# Patient Record
Sex: FEMALE | Race: BLACK OR AFRICAN AMERICAN | Employment: OTHER | URBAN - METROPOLITAN AREA
[De-identification: names, ages, dates, MRNs, and addresses within clinical notes are randomized per-mention and may not be internally consistent; named-entity substitution may affect disease eponyms.]

---

## 2018-07-29 ENCOUNTER — APPOINTMENT (OUTPATIENT)
Dept: GENERAL RADIOLOGY | Age: 78
End: 2018-07-29
Payer: MEDICARE

## 2018-07-29 ENCOUNTER — HOSPITAL ENCOUNTER (EMERGENCY)
Age: 78
Discharge: HOME | End: 2018-07-29
Attending: EMERGENCY MEDICINE
Payer: MEDICARE

## 2018-07-29 VITALS
RESPIRATION RATE: 20 BRPM | HEIGHT: 67 IN | OXYGEN SATURATION: 97 % | BODY MASS INDEX: 41.46 KG/M2 | WEIGHT: 264.19 LBS | TEMPERATURE: 98.1 F | HEART RATE: 81 BPM | SYSTOLIC BLOOD PRESSURE: 140 MMHG | DIASTOLIC BLOOD PRESSURE: 74 MMHG

## 2018-07-29 DIAGNOSIS — M54.50 ACUTE MIDLINE LOW BACK PAIN WITHOUT SCIATICA: Primary | ICD-10-CM

## 2018-07-29 PROCEDURE — 72100 X-RAY EXAM L-S SPINE 2/3 VWS: CPT

## 2018-07-29 PROCEDURE — 6370000000 HC RX 637 (ALT 250 FOR IP): Performed by: PHYSICIAN ASSISTANT

## 2018-07-29 PROCEDURE — 72220 X-RAY EXAM SACRUM TAILBONE: CPT

## 2018-07-29 PROCEDURE — 99283 EMERGENCY DEPT VISIT LOW MDM: CPT

## 2018-07-29 RX ORDER — PANTOPRAZOLE SODIUM 40 MG/1
40 TABLET, DELAYED RELEASE ORAL DAILY
COMMUNITY
End: 2019-11-01 | Stop reason: CLARIF

## 2018-07-29 RX ORDER — LIDOCAINE 50 MG/G
1 PATCH TOPICAL ONCE
Status: DISCONTINUED | OUTPATIENT
Start: 2018-07-29 | End: 2018-07-29 | Stop reason: HOSPADM

## 2018-07-29 ASSESSMENT — PAIN DESCRIPTION - LOCATION: LOCATION: BACK

## 2018-07-29 ASSESSMENT — PAIN DESCRIPTION - PAIN TYPE: TYPE: ACUTE PAIN

## 2018-07-29 ASSESSMENT — PAIN SCALES - GENERAL: PAINLEVEL_OUTOF10: 9

## 2018-07-29 NOTE — ED PROVIDER NOTES
[address] ENCOUNTER          PHYSICIAN ASSISTANT NOTE       Date of evaluation: 7/29/2018    Chief Complaint     Back Pain (onset Thursday; pain in the tailbone; no recent trauma; pt denies previous hx of pain in this area; no radiation)      History of Present Illness     Leslye Montes is a 70 y.o. female who presents to the emergency department for evaluation of back pain. Pain is primarily in her tailbone. She came to the emergency department with her son, who stated that on Thursday she may have sat down too hard on the toilet injuring her back. She has had no associated falls, no weakness of her legs, no numbness in her groin, no saddle anesthesias, and no pain radiating into her buttocks or her legs. She has never had back pain like this in the past.  She has had no difficulty walking with the pain. Pain is primarily when sitting and when going from sitting to standing. She has not taken medication for relief of her symptoms. Review of Systems     Review of Systems   Constitutional: Negative for chills, diaphoresis, fatigue and fever. Respiratory: Negative for chest tightness and shortness of breath. Cardiovascular: Negative for chest pain. Gastrointestinal: Negative for abdominal pain, diarrhea, nausea and vomiting. Musculoskeletal: Positive for back pain. Negative for arthralgias, gait problem, joint swelling, myalgias, neck pain and neck stiffness. Skin: Negative for color change and pallor. Neurological: Negative for weakness and numbness. 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 She has no changes in gait, she was able to really throughout the emergency department without difficulty. She presented afebrile, hemodynamically stable, nontoxic appearing. She had no signs of cord compression, cauda equina syndrome, osteomyelitis or epidural abscess on physical exam.    X-rays completed of the lumbar spine and Sacrum coccyx showing degenerative change with no acute bony abnormality. Discussed with the patient that she has likely bruised her coccyx and the treatment is symptomatic management. The patient should return to the ED if the back pain worsens, or if they experience incontinence, numbness or tingling in the legs, or inability to ambulate. The patient is in agreement with this plan. This patient was also evaluated by the attending physician. All care plans were discussed and agreed upon. Clinical Impression     1. Acute midline low back pain without sciatica        Disposition     PATIENT REFERRED TO:  No follow-up provider specified.     DISCHARGE MEDICATIONS:  Discharge Medication List as of 7/29/2018  1:33 PM          DISPOSITION Decision To Discharge 07/29/2018 01:28:58 PM       Sancho Villalta PA-C  08/02/18 1016

## 2018-08-02 ASSESSMENT — ENCOUNTER SYMPTOMS
ABDOMINAL PAIN: 0
COLOR CHANGE: 0
BACK PAIN: 1
VOMITING: 0
CHEST TIGHTNESS: 0
NAUSEA: 0
SHORTNESS OF BREATH: 0
DIARRHEA: 0

## 2018-09-19 ENCOUNTER — HOSPITAL ENCOUNTER (OUTPATIENT)
Age: 78
Discharge: HOME | End: 2018-09-19
Payer: MEDICARE

## 2018-09-19 ENCOUNTER — HOSPITAL ENCOUNTER (OUTPATIENT)
Dept: GENERAL RADIOLOGY | Age: 78
Discharge: HOME | End: 2018-09-19
Payer: MEDICARE

## 2018-09-19 DIAGNOSIS — M15.9 PRIMARY OSTEOARTHRITIS INVOLVING MULTIPLE JOINTS: ICD-10-CM

## 2018-09-19 DIAGNOSIS — M25.552 BILATERAL HIP PAIN: ICD-10-CM

## 2018-09-19 DIAGNOSIS — R30.0 DYSURIA: ICD-10-CM

## 2018-09-19 DIAGNOSIS — M25.551 BILATERAL HIP PAIN: ICD-10-CM

## 2018-09-19 DIAGNOSIS — R53.81 DEBILITY: ICD-10-CM

## 2018-09-19 PROCEDURE — 73522 X-RAY EXAM HIPS BI 3-4 VIEWS: CPT

## 2018-10-04 ENCOUNTER — APPOINTMENT (OUTPATIENT)
Dept: GENERAL RADIOLOGY | Age: 78
End: 2018-10-04
Payer: MEDICARE

## 2018-10-04 ENCOUNTER — HOSPITAL ENCOUNTER (EMERGENCY)
Age: 78
Discharge: HOME | End: 2018-10-04
Attending: EMERGENCY MEDICINE
Payer: MEDICARE

## 2018-10-04 VITALS
HEART RATE: 78 BPM | OXYGEN SATURATION: 100 % | RESPIRATION RATE: 14 BRPM | TEMPERATURE: 98.1 F | DIASTOLIC BLOOD PRESSURE: 79 MMHG | BODY MASS INDEX: 43.39 KG/M2 | WEIGHT: 270 LBS | HEIGHT: 66 IN | SYSTOLIC BLOOD PRESSURE: 128 MMHG

## 2018-10-04 DIAGNOSIS — R60.0 LEG EDEMA: Primary | ICD-10-CM

## 2018-10-04 LAB
ANION GAP SERPL CALCULATED.3IONS-SCNC: 11 MMOL/L (ref 3–16)
BACTERIA: ABNORMAL /HPF
BASOPHILS ABSOLUTE: 0.1 K/UL (ref 0–0.2)
BASOPHILS RELATIVE PERCENT: 1.2 %
BILIRUBIN URINE: NEGATIVE
BLOOD, URINE: NEGATIVE
BUN BLDV-MCNC: 16 MG/DL (ref 7–20)
CALCIUM SERPL-MCNC: 10.2 MG/DL (ref 8.3–10.6)
CHLORIDE BLD-SCNC: 100 MMOL/L (ref 99–110)
CLARITY: CLEAR
CO2: 27 MMOL/L (ref 21–32)
COLOR: YELLOW
CREAT SERPL-MCNC: 1 MG/DL (ref 0.6–1.2)
EOSINOPHILS ABSOLUTE: 0.1 K/UL (ref 0–0.6)
EOSINOPHILS RELATIVE PERCENT: 1.6 %
EPITHELIAL CELLS, UA: ABNORMAL /HPF
GFR AFRICAN AMERICAN: >60
GFR NON-AFRICAN AMERICAN: 54
GLUCOSE BLD-MCNC: 199 MG/DL (ref 70–99)
GLUCOSE URINE: NEGATIVE MG/DL
HCT VFR BLD CALC: 29.4 % (ref 36–48)
HEMOGLOBIN: 9.5 G/DL (ref 12–16)
KETONES, URINE: NEGATIVE MG/DL
LEUKOCYTE ESTERASE, URINE: ABNORMAL
LYMPHOCYTES ABSOLUTE: 1.8 K/UL (ref 1–5.1)
LYMPHOCYTES RELATIVE PERCENT: 19.8 %
MCH RBC QN AUTO: 25.6 PG (ref 26–34)
MCHC RBC AUTO-ENTMCNC: 32.4 G/DL (ref 31–36)
MCV RBC AUTO: 79 FL (ref 80–100)
MICROSCOPIC EXAMINATION: YES
MONOCYTES ABSOLUTE: 0.8 K/UL (ref 0–1.3)
MONOCYTES RELATIVE PERCENT: 9 %
NEUTROPHILS ABSOLUTE: 6.4 K/UL (ref 1.7–7.7)
NEUTROPHILS RELATIVE PERCENT: 68.4 %
NITRITE, URINE: NEGATIVE
PDW BLD-RTO: 16 % (ref 12.4–15.4)
PH UA: 6.5
PLATELET # BLD: 318 K/UL (ref 135–450)
PMV BLD AUTO: 8.3 FL (ref 5–10.5)
POTASSIUM SERPL-SCNC: 4.5 MMOL/L (ref 3.5–5.1)
PRO-BNP: 36 PG/ML (ref 0–449)
PROTEIN UA: 100 MG/DL
RBC # BLD: 3.73 M/UL (ref 4–5.2)
RBC UA: ABNORMAL /HPF (ref 0–2)
SODIUM BLD-SCNC: 138 MMOL/L (ref 136–145)
SPECIFIC GRAVITY UA: 1.01
URINE TYPE: ABNORMAL
UROBILINOGEN, URINE: 1 E.U./DL
WBC # BLD: 9.4 K/UL (ref 4–11)
WBC UA: ABNORMAL /HPF (ref 0–5)

## 2018-10-04 PROCEDURE — 6370000000 HC RX 637 (ALT 250 FOR IP): Performed by: STUDENT IN AN ORGANIZED HEALTH CARE EDUCATION/TRAINING PROGRAM

## 2018-10-04 PROCEDURE — 93005 ELECTROCARDIOGRAM TRACING: CPT | Performed by: STUDENT IN AN ORGANIZED HEALTH CARE EDUCATION/TRAINING PROGRAM

## 2018-10-04 PROCEDURE — 87086 URINE CULTURE/COLONY COUNT: CPT

## 2018-10-04 PROCEDURE — 6360000002 HC RX W HCPCS: Performed by: STUDENT IN AN ORGANIZED HEALTH CARE EDUCATION/TRAINING PROGRAM

## 2018-10-04 PROCEDURE — 81001 URINALYSIS AUTO W/SCOPE: CPT

## 2018-10-04 PROCEDURE — 71046 X-RAY EXAM CHEST 2 VIEWS: CPT

## 2018-10-04 PROCEDURE — 96374 THER/PROPH/DIAG INJ IV PUSH: CPT

## 2018-10-04 PROCEDURE — 85025 COMPLETE CBC W/AUTO DIFF WBC: CPT

## 2018-10-04 PROCEDURE — 83880 ASSAY OF NATRIURETIC PEPTIDE: CPT

## 2018-10-04 PROCEDURE — 80048 BASIC METABOLIC PNL TOTAL CA: CPT

## 2018-10-04 PROCEDURE — 99284 EMERGENCY DEPT VISIT MOD MDM: CPT

## 2018-10-04 RX ORDER — HYDROCODONE BITARTRATE AND ACETAMINOPHEN 5; 325 MG/1; MG/1
1 TABLET ORAL ONCE
Status: COMPLETED | OUTPATIENT
Start: 2018-10-04 | End: 2018-10-04

## 2018-10-04 RX ORDER — FUROSEMIDE 20 MG/1
20 TABLET ORAL 2 TIMES DAILY
Status: ON HOLD | COMMUNITY
Start: 2018-09-05 | End: 2019-11-03 | Stop reason: SDUPTHER

## 2018-10-04 RX ORDER — ACETAMINOPHEN 325 MG/1
650 TABLET ORAL EVERY 4 HOURS PRN
Qty: 30 TABLET | Refills: 0 | Status: SHIPPED | OUTPATIENT
Start: 2018-10-04 | End: 2021-04-15 | Stop reason: CLARIF

## 2018-10-04 RX ORDER — FUROSEMIDE 10 MG/ML
40 INJECTION INTRAMUSCULAR; INTRAVENOUS ONCE
Status: COMPLETED | OUTPATIENT
Start: 2018-10-04 | End: 2018-10-04

## 2018-10-04 RX ADMIN — HYDROCODONE BITARTRATE AND ACETAMINOPHEN 1 TABLET: 5; 325 TABLET ORAL at 18:54

## 2018-10-04 RX ADMIN — FUROSEMIDE 40 MG: 10 INJECTION, SOLUTION INTRAMUSCULAR; INTRAVENOUS at 17:03

## 2018-10-04 ASSESSMENT — PAIN DESCRIPTION - LOCATION: LOCATION: BACK

## 2018-10-04 ASSESSMENT — ENCOUNTER SYMPTOMS
ABDOMINAL PAIN: 0
CONSTIPATION: 1
SHORTNESS OF BREATH: 0
BLOOD IN STOOL: 1
NAUSEA: 0
DIARRHEA: 0
CHEST TIGHTNESS: 0
BACK PAIN: 1
COUGH: 0
VOMITING: 0
ANAL BLEEDING: 0

## 2018-10-04 ASSESSMENT — PAIN DESCRIPTION - DESCRIPTORS: DESCRIPTORS: DULL

## 2018-10-04 ASSESSMENT — PAIN SCALES - GENERAL: PAINLEVEL_OUTOF10: 9

## 2018-10-04 ASSESSMENT — PAIN DESCRIPTION - ORIENTATION: ORIENTATION: LOWER;LEFT

## 2018-10-04 ASSESSMENT — PAIN DESCRIPTION - PAIN TYPE: TYPE: ACUTE PAIN

## 2018-10-04 NOTE — ED PROVIDER NOTES
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 She has a past surgical history that includes Hysterectomy (2011) and other surgical history (10/01/2013). Her family history is not on file. She reports that she has never smoked. She has never used smokeless tobacco. She reports that she does not drink alcohol or use drugs. Medications     Current Discharge Medication List      CONTINUE these medications which have NOT CHANGED    Details   furosemide (LASIX) 20 MG tablet       pantoprazole (PROTONIX) 40 MG tablet Take 40 mg by mouth daily      glipiZIDE (GLUCOTROL) 2.5 MG ER tablet Take 2.5 mg by mouth daily      risperiDONE (RISPERDAL) 2 MG tablet Take 1 tablet by mouth daily. Qty: 60 tablet, Refills: 0      aspirin EC 81 MG EC tablet Take 1 tablet by mouth daily. Qty: 30 tablet, Refills: 0      lisinopril (PRINIVIL;ZESTRIL) 10 MG tablet Take 10 mg by mouth daily. atorvastatin (LIPITOR) 80 MG tablet Take 1 tablet by mouth nightly. Qty: 30 tablet, Refills: 1             Allergies     She has No Known Allergies. Physical Exam     INITIAL VITALS: BP: 122/76, Temp: 98.1 °F (36.7 °C), Pulse: 77, Resp: 18, SpO2: 99 %   Physical Exam   Constitutional: She is oriented to person, place, and time. She appears well-developed and well-nourished. No distress. HENT:   Head: Normocephalic and atraumatic. Eyes: Pupils are equal, round, and reactive to light.  Conjunctivae are Notes, Past Medical Hx, Past Surgical Hx, Social Hx, Allergies, and Family Hx were reviewed. The patient was given the following medications:  Orders Placed This Encounter   Medications    furosemide (LASIX) injection 40 mg    HYDROcodone-acetaminophen (NORCO) 5-325 MG per tablet 1 tablet    acetaminophen (TYLENOL) 325 MG tablet     Sig: Take 2 tablets by mouth every 4 hours as needed for Pain     Dispense:  30 tablet     Refill:  0       CONSULTS:  None    MEDICAL DECISION MAKING / ASSESSMENT / Fannie Blaine is a 70 y.o. female with HTN, DMT2, CVD in 2015 with residual RLE and LUE weakness, GI bleed, chronic urinary incontinence who presents with back pain and bilateral leg swelling. Patient with tenderness to palpation in the left lumbar area, but no signs of infection or step-offs. No recent trauma or falls that would require additional imaging at this time. No dysuria, questionable increased frequency, and urinalysis with negative nitrites and only trace leukocyte esterase. Low suspicion for UTI at this time. Patient evaluated for new onset congestive heart failure in context of presenting symptom of lower extremity edema. BNP 36. EKG with no acute changes, and CXR with no active pulmonary disease or venous congestion. No signs of volume overload at this time. Lower extremity swelling likely due to dependent edema or venous insufficiency. She will be discharged with a prescription for Tylenol and encouraged to follow up with her PCP, Dr. Candy Dorman, regarding further evaluation of her lower extremity edema as needed. This patient was also evaluated by the attending physician. All care plans were discussed and agreed upon. Clinical Impression     1. Leg edema        Disposition     PATIENT REFERRED TO:  No follow-up provider specified.     DISCHARGE MEDICATIONS:  Current Discharge Medication List          DISPOSITION Decision To Discharge 10/04/2018 06:37:15 PM

## 2018-10-06 LAB — URINE CULTURE, ROUTINE: NORMAL

## 2018-10-12 LAB
EKG ATRIAL RATE: 64 BPM
EKG DIAGNOSIS: NORMAL
EKG P AXIS: 31 DEGREES
EKG P-R INTERVAL: 146 MS
EKG Q-T INTERVAL: 388 MS
EKG QRS DURATION: 76 MS
EKG QTC CALCULATION (BAZETT): 400 MS
EKG R AXIS: -13 DEGREES
EKG T AXIS: 34 DEGREES
EKG VENTRICULAR RATE: 64 BPM

## 2019-01-22 ENCOUNTER — APPOINTMENT (OUTPATIENT)
Dept: GENERAL RADIOLOGY | Age: 79
End: 2019-01-22
Payer: MEDICARE

## 2019-01-22 ENCOUNTER — HOSPITAL ENCOUNTER (EMERGENCY)
Age: 79
Discharge: HOME | End: 2019-01-22
Attending: EMERGENCY MEDICINE
Payer: MEDICARE

## 2019-01-22 VITALS
HEART RATE: 58 BPM | SYSTOLIC BLOOD PRESSURE: 128 MMHG | DIASTOLIC BLOOD PRESSURE: 76 MMHG | TEMPERATURE: 98.5 F | RESPIRATION RATE: 12 BRPM | OXYGEN SATURATION: 94 %

## 2019-01-22 DIAGNOSIS — S46.911A SHOULDER STRAIN, RIGHT, INITIAL ENCOUNTER: Primary | ICD-10-CM

## 2019-01-22 PROCEDURE — 73030 X-RAY EXAM OF SHOULDER: CPT

## 2019-01-22 PROCEDURE — 6370000000 HC RX 637 (ALT 250 FOR IP): Performed by: STUDENT IN AN ORGANIZED HEALTH CARE EDUCATION/TRAINING PROGRAM

## 2019-01-22 PROCEDURE — 99283 EMERGENCY DEPT VISIT LOW MDM: CPT

## 2019-01-22 RX ORDER — ACETAMINOPHEN 325 MG/1
650 TABLET ORAL ONCE
Status: COMPLETED | OUTPATIENT
Start: 2019-01-22 | End: 2019-01-22

## 2019-01-22 RX ORDER — LIDOCAINE 50 MG/G
1 PATCH TOPICAL DAILY
Qty: 30 PATCH | Refills: 0 | Status: SHIPPED | OUTPATIENT
Start: 2019-01-22 | End: 2019-02-21

## 2019-01-22 RX ADMIN — ACETAMINOPHEN 650 MG: 325 TABLET ORAL at 19:38

## 2019-01-22 ASSESSMENT — PAIN SCALES - GENERAL
PAINLEVEL_OUTOF10: 10
PAINLEVEL_OUTOF10: 10

## 2019-01-22 ASSESSMENT — ENCOUNTER SYMPTOMS
GASTROINTESTINAL NEGATIVE: 1
RESPIRATORY NEGATIVE: 1

## 2019-01-22 ASSESSMENT — PAIN DESCRIPTION - ORIENTATION: ORIENTATION: RIGHT

## 2019-01-22 ASSESSMENT — PAIN DESCRIPTION - PAIN TYPE: TYPE: ACUTE PAIN

## 2019-01-22 ASSESSMENT — PAIN DESCRIPTION - DESCRIPTORS: DESCRIPTORS: ACHING;CONSTANT

## 2019-01-22 ASSESSMENT — PAIN DESCRIPTION - LOCATION: LOCATION: SHOULDER

## 2019-11-01 ENCOUNTER — HOSPITAL ENCOUNTER (OUTPATIENT)
Age: 79
Setting detail: OBSERVATION
Discharge: HOME | End: 2019-11-03
Attending: EMERGENCY MEDICINE | Admitting: INTERNAL MEDICINE
Payer: MEDICARE

## 2019-11-01 ENCOUNTER — APPOINTMENT (OUTPATIENT)
Dept: GENERAL RADIOLOGY | Age: 79
End: 2019-11-01
Payer: MEDICARE

## 2019-11-01 DIAGNOSIS — R07.9 CHEST PAIN, UNSPECIFIED TYPE: Primary | ICD-10-CM

## 2019-11-01 PROBLEM — E66.01 MORBID OBESITY DUE TO EXCESS CALORIES (HCC): Status: ACTIVE | Noted: 2019-11-01

## 2019-11-01 PROBLEM — E78.5 HYPERLIPIDEMIA: Status: ACTIVE | Noted: 2019-11-01

## 2019-11-01 PROBLEM — E11.9 DMII (DIABETES MELLITUS, TYPE 2) (HCC): Status: ACTIVE | Noted: 2019-11-01

## 2019-11-01 LAB
ANION GAP SERPL CALCULATED.3IONS-SCNC: 12 MMOL/L (ref 3–16)
BASOPHILS ABSOLUTE: 0.2 K/UL (ref 0–0.2)
BASOPHILS RELATIVE PERCENT: 1.9 %
BUN BLDV-MCNC: 10 MG/DL (ref 7–20)
CALCIUM SERPL-MCNC: 10 MG/DL (ref 8.3–10.6)
CHLORIDE BLD-SCNC: 103 MMOL/L (ref 99–110)
CO2: 22 MMOL/L (ref 21–32)
CREAT SERPL-MCNC: 0.8 MG/DL (ref 0.6–1.2)
EKG ATRIAL RATE: 58 BPM
EKG ATRIAL RATE: 69 BPM
EKG DIAGNOSIS: NORMAL
EKG DIAGNOSIS: NORMAL
EKG P AXIS: 25 DEGREES
EKG P AXIS: 46 DEGREES
EKG P-R INTERVAL: 148 MS
EKG P-R INTERVAL: 164 MS
EKG Q-T INTERVAL: 406 MS
EKG Q-T INTERVAL: 414 MS
EKG QRS DURATION: 74 MS
EKG QRS DURATION: 76 MS
EKG QTC CALCULATION (BAZETT): 398 MS
EKG QTC CALCULATION (BAZETT): 443 MS
EKG R AXIS: -12 DEGREES
EKG R AXIS: -18 DEGREES
EKG T AXIS: -62 DEGREES
EKG T AXIS: 31 DEGREES
EKG VENTRICULAR RATE: 58 BPM
EKG VENTRICULAR RATE: 69 BPM
EOSINOPHILS ABSOLUTE: 0.2 K/UL (ref 0–0.6)
EOSINOPHILS RELATIVE PERCENT: 2.6 %
GFR AFRICAN AMERICAN: >60
GFR NON-AFRICAN AMERICAN: >60
GLUCOSE BLD-MCNC: 202 MG/DL (ref 70–99)
GLUCOSE BLD-MCNC: 261 MG/DL (ref 70–99)
GLUCOSE BLD-MCNC: 311 MG/DL (ref 70–99)
HCT VFR BLD CALC: 31.2 % (ref 36–48)
HEMOGLOBIN: 9.8 G/DL (ref 12–16)
LYMPHOCYTES ABSOLUTE: 2 K/UL (ref 1–5.1)
LYMPHOCYTES RELATIVE PERCENT: 25.6 %
MCH RBC QN AUTO: 23.5 PG (ref 26–34)
MCHC RBC AUTO-ENTMCNC: 31.5 G/DL (ref 31–36)
MCV RBC AUTO: 74.8 FL (ref 80–100)
MONOCYTES ABSOLUTE: 0.9 K/UL (ref 0–1.3)
MONOCYTES RELATIVE PERCENT: 11.6 %
NEUTROPHILS ABSOLUTE: 4.7 K/UL (ref 1.7–7.7)
NEUTROPHILS RELATIVE PERCENT: 58.3 %
PDW BLD-RTO: 18.4 % (ref 12.4–15.4)
PERFORMED ON: ABNORMAL
PERFORMED ON: ABNORMAL
PLATELET # BLD: 246 K/UL (ref 135–450)
PMV BLD AUTO: 8.9 FL (ref 5–10.5)
POTASSIUM REFLEX MAGNESIUM: 4.5 MMOL/L (ref 3.5–5.1)
PRO-BNP: 34 PG/ML (ref 0–449)
RBC # BLD: 4.18 M/UL (ref 4–5.2)
SODIUM BLD-SCNC: 137 MMOL/L (ref 136–145)
T4 FREE: 1.3 NG/DL (ref 0.9–1.8)
TROPONIN: <0.01 NG/ML
TROPONIN: <0.01 NG/ML
TSH SERPL DL<=0.05 MIU/L-ACNC: 0.85 UIU/ML (ref 0.27–4.2)
WBC # BLD: 8 K/UL (ref 4–11)

## 2019-11-01 PROCEDURE — 85025 COMPLETE CBC W/AUTO DIFF WBC: CPT

## 2019-11-01 PROCEDURE — G0378 HOSPITAL OBSERVATION PER HR: HCPCS

## 2019-11-01 PROCEDURE — 2580000003 HC RX 258: Performed by: INTERNAL MEDICINE

## 2019-11-01 PROCEDURE — 99285 EMERGENCY DEPT VISIT HI MDM: CPT

## 2019-11-01 PROCEDURE — 6370000000 HC RX 637 (ALT 250 FOR IP): Performed by: INTERNAL MEDICINE

## 2019-11-01 PROCEDURE — 36415 COLL VENOUS BLD VENIPUNCTURE: CPT

## 2019-11-01 PROCEDURE — 93010 ELECTROCARDIOGRAM REPORT: CPT | Performed by: INTERNAL MEDICINE

## 2019-11-01 PROCEDURE — 84439 ASSAY OF FREE THYROXINE: CPT

## 2019-11-01 PROCEDURE — 83880 ASSAY OF NATRIURETIC PEPTIDE: CPT

## 2019-11-01 PROCEDURE — 96372 THER/PROPH/DIAG INJ SC/IM: CPT

## 2019-11-01 PROCEDURE — 93005 ELECTROCARDIOGRAM TRACING: CPT | Performed by: INTERNAL MEDICINE

## 2019-11-01 PROCEDURE — 84484 ASSAY OF TROPONIN QUANT: CPT

## 2019-11-01 PROCEDURE — 93005 ELECTROCARDIOGRAM TRACING: CPT | Performed by: STUDENT IN AN ORGANIZED HEALTH CARE EDUCATION/TRAINING PROGRAM

## 2019-11-01 PROCEDURE — 84443 ASSAY THYROID STIM HORMONE: CPT

## 2019-11-01 PROCEDURE — 6360000002 HC RX W HCPCS: Performed by: INTERNAL MEDICINE

## 2019-11-01 PROCEDURE — 80048 BASIC METABOLIC PNL TOTAL CA: CPT

## 2019-11-01 PROCEDURE — 71045 X-RAY EXAM CHEST 1 VIEW: CPT

## 2019-11-01 RX ORDER — ATORVASTATIN CALCIUM 80 MG/1
80 TABLET, FILM COATED ORAL NIGHTLY
Status: DISCONTINUED | OUTPATIENT
Start: 2019-11-01 | End: 2019-11-03 | Stop reason: HOSPADM

## 2019-11-01 RX ORDER — LISINOPRIL 10 MG/1
10 TABLET ORAL DAILY
Status: DISCONTINUED | OUTPATIENT
Start: 2019-11-01 | End: 2019-11-03 | Stop reason: HOSPADM

## 2019-11-01 RX ORDER — INSULIN LISPRO 100 [IU]/ML
0-12 INJECTION, SOLUTION INTRAVENOUS; SUBCUTANEOUS
Status: DISCONTINUED | OUTPATIENT
Start: 2019-11-01 | End: 2019-11-01

## 2019-11-01 RX ORDER — RISPERIDONE 1 MG/1
2 TABLET, FILM COATED ORAL NIGHTLY
Status: DISCONTINUED | OUTPATIENT
Start: 2019-11-01 | End: 2019-11-03 | Stop reason: HOSPADM

## 2019-11-01 RX ORDER — SODIUM CHLORIDE 0.9 % (FLUSH) 0.9 %
10 SYRINGE (ML) INJECTION EVERY 12 HOURS SCHEDULED
Status: DISCONTINUED | OUTPATIENT
Start: 2019-11-01 | End: 2019-11-03 | Stop reason: HOSPADM

## 2019-11-01 RX ORDER — INSULIN LISPRO 100 [IU]/ML
0-6 INJECTION, SOLUTION INTRAVENOUS; SUBCUTANEOUS NIGHTLY
Status: DISCONTINUED | OUTPATIENT
Start: 2019-11-01 | End: 2019-11-01

## 2019-11-01 RX ORDER — INSULIN LISPRO 100 [IU]/ML
0-12 INJECTION, SOLUTION INTRAVENOUS; SUBCUTANEOUS
Status: COMPLETED | OUTPATIENT
Start: 2019-11-01 | End: 2019-11-01

## 2019-11-01 RX ORDER — INSULIN LISPRO 100 [IU]/ML
0-12 INJECTION, SOLUTION INTRAVENOUS; SUBCUTANEOUS EVERY 4 HOURS
Status: DISCONTINUED | OUTPATIENT
Start: 2019-11-01 | End: 2019-11-01

## 2019-11-01 RX ORDER — ASPIRIN 81 MG/1
325 TABLET ORAL DAILY
COMMUNITY

## 2019-11-01 RX ORDER — ONDANSETRON 2 MG/ML
4 INJECTION INTRAMUSCULAR; INTRAVENOUS EVERY 4 HOURS PRN
Status: DISCONTINUED | OUTPATIENT
Start: 2019-11-01 | End: 2019-11-03 | Stop reason: HOSPADM

## 2019-11-01 RX ORDER — ACETAMINOPHEN 325 MG/1
650 TABLET ORAL EVERY 4 HOURS PRN
Status: DISCONTINUED | OUTPATIENT
Start: 2019-11-01 | End: 2019-11-03 | Stop reason: HOSPADM

## 2019-11-01 RX ORDER — SODIUM CHLORIDE 0.9 % (FLUSH) 0.9 %
10 SYRINGE (ML) INJECTION PRN
Status: DISCONTINUED | OUTPATIENT
Start: 2019-11-01 | End: 2019-11-03 | Stop reason: HOSPADM

## 2019-11-01 RX ORDER — FUROSEMIDE 20 MG/1
20 TABLET ORAL 2 TIMES DAILY
Status: DISCONTINUED | OUTPATIENT
Start: 2019-11-01 | End: 2019-11-01 | Stop reason: SDUPTHER

## 2019-11-01 RX ORDER — FAMOTIDINE 20 MG/1
20 TABLET, FILM COATED ORAL 2 TIMES DAILY
Status: DISCONTINUED | OUTPATIENT
Start: 2019-11-01 | End: 2019-11-03 | Stop reason: HOSPADM

## 2019-11-01 RX ORDER — DEXTROSE MONOHYDRATE 25 G/50ML
12.5 INJECTION, SOLUTION INTRAVENOUS PRN
Status: DISCONTINUED | OUTPATIENT
Start: 2019-11-01 | End: 2019-11-03 | Stop reason: HOSPADM

## 2019-11-01 RX ORDER — RISPERIDONE 1 MG/1
2 TABLET, FILM COATED ORAL DAILY
Status: DISCONTINUED | OUTPATIENT
Start: 2019-11-01 | End: 2019-11-01

## 2019-11-01 RX ORDER — NICOTINE POLACRILEX 4 MG
15 LOZENGE BUCCAL PRN
Status: DISCONTINUED | OUTPATIENT
Start: 2019-11-01 | End: 2019-11-03 | Stop reason: HOSPADM

## 2019-11-01 RX ORDER — INSULIN LISPRO 100 [IU]/ML
0-6 INJECTION, SOLUTION INTRAVENOUS; SUBCUTANEOUS NIGHTLY
Status: COMPLETED | OUTPATIENT
Start: 2019-11-01 | End: 2019-11-01

## 2019-11-01 RX ORDER — DEXTROSE MONOHYDRATE 50 MG/ML
100 INJECTION, SOLUTION INTRAVENOUS PRN
Status: DISCONTINUED | OUTPATIENT
Start: 2019-11-01 | End: 2019-11-03 | Stop reason: HOSPADM

## 2019-11-01 RX ORDER — SODIUM CHLORIDE 9 MG/ML
INJECTION, SOLUTION INTRAVENOUS CONTINUOUS
Status: DISCONTINUED | OUTPATIENT
Start: 2019-11-02 | End: 2019-11-02

## 2019-11-01 RX ORDER — INSULIN LISPRO 100 [IU]/ML
0-12 INJECTION, SOLUTION INTRAVENOUS; SUBCUTANEOUS EVERY 4 HOURS
Status: DISCONTINUED | OUTPATIENT
Start: 2019-11-02 | End: 2019-11-02

## 2019-11-01 RX ADMIN — INSULIN LISPRO 8 UNITS: 100 INJECTION, SOLUTION INTRAVENOUS; SUBCUTANEOUS at 18:05

## 2019-11-01 RX ADMIN — ENOXAPARIN SODIUM 120 MG: 120 INJECTION SUBCUTANEOUS at 21:33

## 2019-11-01 RX ADMIN — LISINOPRIL 10 MG: 10 TABLET ORAL at 14:59

## 2019-11-01 RX ADMIN — FAMOTIDINE 20 MG: 20 TABLET ORAL at 21:32

## 2019-11-01 RX ADMIN — SODIUM CHLORIDE: 9 INJECTION, SOLUTION INTRAVENOUS at 23:57

## 2019-11-01 RX ADMIN — ENOXAPARIN SODIUM 40 MG: 40 INJECTION SUBCUTANEOUS at 14:59

## 2019-11-01 RX ADMIN — ASPIRIN 325 MG: 325 TABLET, DELAYED RELEASE ORAL at 14:59

## 2019-11-01 RX ADMIN — RISPERIDONE 2 MG: 1 TABLET ORAL at 21:32

## 2019-11-01 RX ADMIN — INSULIN LISPRO 3 UNITS: 100 INJECTION, SOLUTION INTRAVENOUS; SUBCUTANEOUS at 21:33

## 2019-11-01 RX ADMIN — ATORVASTATIN CALCIUM 80 MG: 80 TABLET, FILM COATED ORAL at 21:32

## 2019-11-01 RX ADMIN — Medication 10 ML: at 21:45

## 2019-11-01 ASSESSMENT — PAIN SCALES - GENERAL
PAINLEVEL_OUTOF10: 0
PAINLEVEL_OUTOF10: 5
PAINLEVEL_OUTOF10: 0

## 2019-11-01 ASSESSMENT — ENCOUNTER SYMPTOMS
ABDOMINAL PAIN: 0
SHORTNESS OF BREATH: 1
EYE REDNESS: 0
VOMITING: 0
SINUS PRESSURE: 0
NAUSEA: 0
BACK PAIN: 0
EYE PAIN: 0
COUGH: 0
SORE THROAT: 0

## 2019-11-01 ASSESSMENT — PAIN DESCRIPTION - LOCATION: LOCATION: CHEST

## 2019-11-01 ASSESSMENT — PAIN DESCRIPTION - PAIN TYPE: TYPE: ACUTE PAIN

## 2019-11-02 PROBLEM — R07.89 ATYPICAL CHEST PAIN: Status: ACTIVE | Noted: 2019-11-01

## 2019-11-02 LAB
ANION GAP SERPL CALCULATED.3IONS-SCNC: 10 MMOL/L (ref 3–16)
BASOPHILS ABSOLUTE: 0.1 K/UL (ref 0–0.2)
BASOPHILS RELATIVE PERCENT: 1.2 %
BUN BLDV-MCNC: 11 MG/DL (ref 7–20)
CALCIUM SERPL-MCNC: 9.8 MG/DL (ref 8.3–10.6)
CHLORIDE BLD-SCNC: 106 MMOL/L (ref 99–110)
CO2: 23 MMOL/L (ref 21–32)
CREAT SERPL-MCNC: 0.8 MG/DL (ref 0.6–1.2)
EOSINOPHILS ABSOLUTE: 0.2 K/UL (ref 0–0.6)
EOSINOPHILS RELATIVE PERCENT: 2.1 %
GFR AFRICAN AMERICAN: >60
GFR NON-AFRICAN AMERICAN: >60
GLUCOSE BLD-MCNC: 122 MG/DL (ref 70–99)
GLUCOSE BLD-MCNC: 140 MG/DL (ref 70–99)
GLUCOSE BLD-MCNC: 147 MG/DL (ref 70–99)
GLUCOSE BLD-MCNC: 175 MG/DL (ref 70–99)
GLUCOSE BLD-MCNC: 213 MG/DL (ref 70–99)
GLUCOSE BLD-MCNC: 225 MG/DL (ref 70–99)
GLUCOSE BLD-MCNC: 229 MG/DL (ref 70–99)
HCT VFR BLD CALC: 30 % (ref 36–48)
HEMOGLOBIN: 9.6 G/DL (ref 12–16)
LV EF: 70 %
LVEF MODALITY: NORMAL
LYMPHOCYTES ABSOLUTE: 2.9 K/UL (ref 1–5.1)
LYMPHOCYTES RELATIVE PERCENT: 35.6 %
MCH RBC QN AUTO: 23.8 PG (ref 26–34)
MCHC RBC AUTO-ENTMCNC: 32.1 G/DL (ref 31–36)
MCV RBC AUTO: 74.2 FL (ref 80–100)
MONOCYTES ABSOLUTE: 0.9 K/UL (ref 0–1.3)
MONOCYTES RELATIVE PERCENT: 10.7 %
NEUTROPHILS ABSOLUTE: 4.1 K/UL (ref 1.7–7.7)
NEUTROPHILS RELATIVE PERCENT: 50.4 %
PDW BLD-RTO: 18 % (ref 12.4–15.4)
PERFORMED ON: ABNORMAL
PLATELET # BLD: 242 K/UL (ref 135–450)
PMV BLD AUTO: 8.7 FL (ref 5–10.5)
POTASSIUM REFLEX MAGNESIUM: 4.1 MMOL/L (ref 3.5–5.1)
RBC # BLD: 4.04 M/UL (ref 4–5.2)
SODIUM BLD-SCNC: 139 MMOL/L (ref 136–145)
TROPONIN: <0.01 NG/ML
TROPONIN: <0.01 NG/ML
WBC # BLD: 8.1 K/UL (ref 4–11)

## 2019-11-02 PROCEDURE — A9502 TC99M TETROFOSMIN: HCPCS | Performed by: INTERNAL MEDICINE

## 2019-11-02 PROCEDURE — 80048 BASIC METABOLIC PNL TOTAL CA: CPT

## 2019-11-02 PROCEDURE — 84484 ASSAY OF TROPONIN QUANT: CPT

## 2019-11-02 PROCEDURE — 6360000002 HC RX W HCPCS: Performed by: INTERNAL MEDICINE

## 2019-11-02 PROCEDURE — 85025 COMPLETE CBC W/AUTO DIFF WBC: CPT

## 2019-11-02 PROCEDURE — G0378 HOSPITAL OBSERVATION PER HR: HCPCS

## 2019-11-02 PROCEDURE — 2580000003 HC RX 258: Performed by: INTERNAL MEDICINE

## 2019-11-02 PROCEDURE — 93017 CV STRESS TEST TRACING ONLY: CPT

## 2019-11-02 PROCEDURE — 6370000000 HC RX 637 (ALT 250 FOR IP): Performed by: INTERNAL MEDICINE

## 2019-11-02 PROCEDURE — 78452 HT MUSCLE IMAGE SPECT MULT: CPT

## 2019-11-02 PROCEDURE — 99215 OFFICE O/P EST HI 40 MIN: CPT | Performed by: INTERNAL MEDICINE

## 2019-11-02 PROCEDURE — 96372 THER/PROPH/DIAG INJ SC/IM: CPT

## 2019-11-02 PROCEDURE — 36415 COLL VENOUS BLD VENIPUNCTURE: CPT

## 2019-11-02 PROCEDURE — 3430000000 HC RX DIAGNOSTIC RADIOPHARMACEUTICAL: Performed by: INTERNAL MEDICINE

## 2019-11-02 RX ORDER — INSULIN LISPRO 100 [IU]/ML
0-12 INJECTION, SOLUTION INTRAVENOUS; SUBCUTANEOUS
Status: DISCONTINUED | OUTPATIENT
Start: 2019-11-02 | End: 2019-11-03 | Stop reason: HOSPADM

## 2019-11-02 RX ADMIN — LISINOPRIL 10 MG: 10 TABLET ORAL at 10:30

## 2019-11-02 RX ADMIN — REGADENOSON 0.4 MG: 0.08 INJECTION, SOLUTION INTRAVENOUS at 09:19

## 2019-11-02 RX ADMIN — Medication 10 ML: at 11:03

## 2019-11-02 RX ADMIN — RISPERIDONE 2 MG: 1 TABLET ORAL at 21:10

## 2019-11-02 RX ADMIN — INSULIN LISPRO 4 UNITS: 100 INJECTION, SOLUTION INTRAVENOUS; SUBCUTANEOUS at 18:14

## 2019-11-02 RX ADMIN — ENOXAPARIN SODIUM 120 MG: 120 INJECTION SUBCUTANEOUS at 21:11

## 2019-11-02 RX ADMIN — ASPIRIN 325 MG: 325 TABLET, DELAYED RELEASE ORAL at 10:30

## 2019-11-02 RX ADMIN — INSULIN LISPRO 4 UNITS: 100 INJECTION, SOLUTION INTRAVENOUS; SUBCUTANEOUS at 21:11

## 2019-11-02 RX ADMIN — ATORVASTATIN CALCIUM 80 MG: 80 TABLET, FILM COATED ORAL at 21:10

## 2019-11-02 RX ADMIN — Medication 10 ML: at 09:19

## 2019-11-02 RX ADMIN — Medication 10 ML: at 21:11

## 2019-11-02 RX ADMIN — TETROFOSMIN 34.1 MILLICURIE: 1.38 INJECTION, POWDER, LYOPHILIZED, FOR SOLUTION INTRAVENOUS at 09:19

## 2019-11-02 RX ADMIN — TETROFOSMIN 11.4 MILLICURIE: 1.38 INJECTION, POWDER, LYOPHILIZED, FOR SOLUTION INTRAVENOUS at 07:57

## 2019-11-02 RX ADMIN — FAMOTIDINE 20 MG: 20 TABLET ORAL at 21:11

## 2019-11-02 RX ADMIN — Medication 10 ML: at 07:57

## 2019-11-02 RX ADMIN — ENOXAPARIN SODIUM 120 MG: 120 INJECTION SUBCUTANEOUS at 10:33

## 2019-11-02 RX ADMIN — INSULIN LISPRO 4 UNITS: 100 INJECTION, SOLUTION INTRAVENOUS; SUBCUTANEOUS at 00:04

## 2019-11-02 RX ADMIN — FAMOTIDINE 20 MG: 20 TABLET ORAL at 10:30

## 2019-11-02 ASSESSMENT — PAIN SCALES - GENERAL
PAINLEVEL_OUTOF10: 0

## 2019-11-03 VITALS
HEIGHT: 67 IN | WEIGHT: 274 LBS | TEMPERATURE: 98.3 F | SYSTOLIC BLOOD PRESSURE: 135 MMHG | RESPIRATION RATE: 16 BRPM | BODY MASS INDEX: 43 KG/M2 | HEART RATE: 77 BPM | DIASTOLIC BLOOD PRESSURE: 87 MMHG | OXYGEN SATURATION: 98 %

## 2019-11-03 LAB
ANION GAP SERPL CALCULATED.3IONS-SCNC: 10 MMOL/L (ref 3–16)
BASOPHILS ABSOLUTE: 0.1 K/UL (ref 0–0.2)
BASOPHILS RELATIVE PERCENT: 0.9 %
BUN BLDV-MCNC: 12 MG/DL (ref 7–20)
CALCIUM SERPL-MCNC: 9.5 MG/DL (ref 8.3–10.6)
CHLORIDE BLD-SCNC: 105 MMOL/L (ref 99–110)
CO2: 24 MMOL/L (ref 21–32)
CREAT SERPL-MCNC: 0.9 MG/DL (ref 0.6–1.2)
EOSINOPHILS ABSOLUTE: 0.2 K/UL (ref 0–0.6)
EOSINOPHILS RELATIVE PERCENT: 2.2 %
GFR AFRICAN AMERICAN: >60
GFR NON-AFRICAN AMERICAN: >60
GLUCOSE BLD-MCNC: 220 MG/DL (ref 70–99)
GLUCOSE BLD-MCNC: 230 MG/DL (ref 70–99)
GLUCOSE BLD-MCNC: 232 MG/DL (ref 70–99)
HCT VFR BLD CALC: 29.9 % (ref 36–48)
HEMOGLOBIN: 9.6 G/DL (ref 12–16)
LYMPHOCYTES ABSOLUTE: 2.8 K/UL (ref 1–5.1)
LYMPHOCYTES RELATIVE PERCENT: 33.4 %
MCH RBC QN AUTO: 24 PG (ref 26–34)
MCHC RBC AUTO-ENTMCNC: 32.2 G/DL (ref 31–36)
MCV RBC AUTO: 74.6 FL (ref 80–100)
MONOCYTES ABSOLUTE: 0.8 K/UL (ref 0–1.3)
MONOCYTES RELATIVE PERCENT: 10.1 %
NEUTROPHILS ABSOLUTE: 4.4 K/UL (ref 1.7–7.7)
NEUTROPHILS RELATIVE PERCENT: 53.4 %
PDW BLD-RTO: 18.4 % (ref 12.4–15.4)
PERFORMED ON: ABNORMAL
PERFORMED ON: ABNORMAL
PLATELET # BLD: 240 K/UL (ref 135–450)
PMV BLD AUTO: 8.7 FL (ref 5–10.5)
POTASSIUM REFLEX MAGNESIUM: 4.1 MMOL/L (ref 3.5–5.1)
RBC # BLD: 4 M/UL (ref 4–5.2)
SODIUM BLD-SCNC: 139 MMOL/L (ref 136–145)
WBC # BLD: 8.3 K/UL (ref 4–11)

## 2019-11-03 PROCEDURE — G0378 HOSPITAL OBSERVATION PER HR: HCPCS

## 2019-11-03 PROCEDURE — 85025 COMPLETE CBC W/AUTO DIFF WBC: CPT

## 2019-11-03 PROCEDURE — 6370000000 HC RX 637 (ALT 250 FOR IP): Performed by: INTERNAL MEDICINE

## 2019-11-03 PROCEDURE — 6360000002 HC RX W HCPCS: Performed by: INTERNAL MEDICINE

## 2019-11-03 PROCEDURE — 80048 BASIC METABOLIC PNL TOTAL CA: CPT

## 2019-11-03 PROCEDURE — 36415 COLL VENOUS BLD VENIPUNCTURE: CPT

## 2019-11-03 PROCEDURE — 99213 OFFICE O/P EST LOW 20 MIN: CPT | Performed by: INTERNAL MEDICINE

## 2019-11-03 PROCEDURE — 96372 THER/PROPH/DIAG INJ SC/IM: CPT

## 2019-11-03 PROCEDURE — 2580000003 HC RX 258: Performed by: INTERNAL MEDICINE

## 2019-11-03 RX ORDER — FAMOTIDINE 20 MG/1
20 TABLET, FILM COATED ORAL 2 TIMES DAILY
Qty: 60 TABLET | Refills: 3 | Status: SHIPPED | OUTPATIENT
Start: 2019-11-03 | End: 2021-04-15 | Stop reason: CLARIF

## 2019-11-03 RX ORDER — FUROSEMIDE 20 MG/1
20 TABLET ORAL DAILY
Qty: 30 TABLET | Refills: 3 | Status: SHIPPED | OUTPATIENT
Start: 2019-11-03 | End: 2021-04-15 | Stop reason: CLARIF

## 2019-11-03 RX ADMIN — ASPIRIN 325 MG: 325 TABLET, DELAYED RELEASE ORAL at 08:57

## 2019-11-03 RX ADMIN — FAMOTIDINE 20 MG: 20 TABLET ORAL at 08:57

## 2019-11-03 RX ADMIN — INSULIN LISPRO 4 UNITS: 100 INJECTION, SOLUTION INTRAVENOUS; SUBCUTANEOUS at 08:59

## 2019-11-03 RX ADMIN — Medication 10 ML: at 09:01

## 2019-11-03 RX ADMIN — ENOXAPARIN SODIUM 120 MG: 120 INJECTION SUBCUTANEOUS at 09:01

## 2019-11-03 RX ADMIN — METOPROLOL TARTRATE 12.5 MG: 25 TABLET ORAL at 08:57

## 2019-11-03 RX ADMIN — MAGNESIUM HYDROXIDE 30 ML: 2400 SUSPENSION ORAL at 00:10

## 2019-11-03 RX ADMIN — LISINOPRIL 10 MG: 10 TABLET ORAL at 09:08

## 2019-11-03 ASSESSMENT — PAIN SCALES - GENERAL: PAINLEVEL_OUTOF10: 0

## 2019-11-26 ENCOUNTER — APPOINTMENT (OUTPATIENT)
Dept: GENERAL RADIOLOGY | Age: 79
End: 2019-11-26
Payer: MEDICARE

## 2019-11-26 ENCOUNTER — HOSPITAL ENCOUNTER (EMERGENCY)
Age: 79
Discharge: HOME | End: 2019-11-26
Attending: EMERGENCY MEDICINE
Payer: MEDICARE

## 2019-11-26 VITALS
OXYGEN SATURATION: 95 % | DIASTOLIC BLOOD PRESSURE: 86 MMHG | SYSTOLIC BLOOD PRESSURE: 162 MMHG | HEART RATE: 69 BPM | TEMPERATURE: 98.9 F | RESPIRATION RATE: 16 BRPM

## 2019-11-26 DIAGNOSIS — J06.9 UPPER RESPIRATORY TRACT INFECTION, UNSPECIFIED TYPE: Primary | ICD-10-CM

## 2019-11-26 LAB
ANION GAP SERPL CALCULATED.3IONS-SCNC: 12 MMOL/L (ref 3–16)
BASE EXCESS VENOUS: 4 (ref -3–3)
BASOPHILS ABSOLUTE: 0.1 K/UL (ref 0–0.2)
BASOPHILS RELATIVE PERCENT: 1.4 %
BUN BLDV-MCNC: 12 MG/DL (ref 7–20)
CALCIUM SERPL-MCNC: 10.1 MG/DL (ref 8.3–10.6)
CHLORIDE BLD-SCNC: 105 MMOL/L (ref 99–110)
CO2: 23 MMOL/L (ref 21–32)
CREAT SERPL-MCNC: 0.9 MG/DL (ref 0.6–1.2)
EOSINOPHILS ABSOLUTE: 0.4 K/UL (ref 0–0.6)
EOSINOPHILS RELATIVE PERCENT: 4.8 %
GFR AFRICAN AMERICAN: >60
GFR NON-AFRICAN AMERICAN: >60
GLUCOSE BLD-MCNC: 227 MG/DL (ref 70–99)
HCO3 VENOUS: 27.6 MMOL/L (ref 23–29)
HCT VFR BLD CALC: 31.5 % (ref 36–48)
HEMOGLOBIN: 9.9 G/DL (ref 12–16)
LACTATE: 0.99 MMOL/L (ref 0.4–2)
LYMPHOCYTES ABSOLUTE: 2.1 K/UL (ref 1–5.1)
LYMPHOCYTES RELATIVE PERCENT: 24.1 %
MCH RBC QN AUTO: 23.2 PG (ref 26–34)
MCHC RBC AUTO-ENTMCNC: 31.5 G/DL (ref 31–36)
MCV RBC AUTO: 73.8 FL (ref 80–100)
MONOCYTES ABSOLUTE: 1.2 K/UL (ref 0–1.3)
MONOCYTES RELATIVE PERCENT: 13.6 %
NEUTROPHILS ABSOLUTE: 4.8 K/UL (ref 1.7–7.7)
NEUTROPHILS RELATIVE PERCENT: 56.1 %
O2 SAT, VEN: 96 %
PCO2, VEN: 39.8 MM HG (ref 40–50)
PDW BLD-RTO: 18.3 % (ref 12.4–15.4)
PERFORMED ON: ABNORMAL
PH VENOUS: 7.45 (ref 7.35–7.45)
PLATELET # BLD: 242 K/UL (ref 135–450)
PMV BLD AUTO: 9.3 FL (ref 5–10.5)
PO2, VEN: 80 MM HG
POC SAMPLE TYPE: ABNORMAL
POTASSIUM SERPL-SCNC: 4.5 MMOL/L (ref 3.5–5.1)
PRO-BNP: 41 PG/ML (ref 0–449)
RAPID INFLUENZA  B AGN: NEGATIVE
RAPID INFLUENZA A AGN: NEGATIVE
RBC # BLD: 4.26 M/UL (ref 4–5.2)
SODIUM BLD-SCNC: 140 MMOL/L (ref 136–145)
TCO2 CALC VENOUS: 29 MMOL/L
TROPONIN: <0.01 NG/ML
WBC # BLD: 8.6 K/UL (ref 4–11)

## 2019-11-26 PROCEDURE — 83880 ASSAY OF NATRIURETIC PEPTIDE: CPT

## 2019-11-26 PROCEDURE — 83605 ASSAY OF LACTIC ACID: CPT

## 2019-11-26 PROCEDURE — 84484 ASSAY OF TROPONIN QUANT: CPT

## 2019-11-26 PROCEDURE — 82803 BLOOD GASES ANY COMBINATION: CPT

## 2019-11-26 PROCEDURE — 80048 BASIC METABOLIC PNL TOTAL CA: CPT

## 2019-11-26 PROCEDURE — 85025 COMPLETE CBC W/AUTO DIFF WBC: CPT

## 2019-11-26 PROCEDURE — 99285 EMERGENCY DEPT VISIT HI MDM: CPT

## 2019-11-26 PROCEDURE — 36415 COLL VENOUS BLD VENIPUNCTURE: CPT

## 2019-11-26 PROCEDURE — 87804 INFLUENZA ASSAY W/OPTIC: CPT

## 2019-11-26 PROCEDURE — 93005 ELECTROCARDIOGRAM TRACING: CPT | Performed by: EMERGENCY MEDICINE

## 2019-11-26 PROCEDURE — 71046 X-RAY EXAM CHEST 2 VIEWS: CPT

## 2019-11-26 ASSESSMENT — PAIN - FUNCTIONAL ASSESSMENT: PAIN_FUNCTIONAL_ASSESSMENT: ACTIVITIES ARE NOT PREVENTED

## 2019-11-26 ASSESSMENT — PAIN DESCRIPTION - PROGRESSION: CLINICAL_PROGRESSION: GRADUALLY WORSENING

## 2019-11-26 ASSESSMENT — PAIN DESCRIPTION - LOCATION: LOCATION: CHEST

## 2019-11-26 ASSESSMENT — ENCOUNTER SYMPTOMS
EYES NEGATIVE: 1
GASTROINTESTINAL NEGATIVE: 1
COUGH: 1
SHORTNESS OF BREATH: 1

## 2019-11-26 ASSESSMENT — PAIN DESCRIPTION - FREQUENCY: FREQUENCY: CONTINUOUS

## 2019-11-26 ASSESSMENT — PAIN DESCRIPTION - DESCRIPTORS: DESCRIPTORS: DISCOMFORT

## 2019-11-26 ASSESSMENT — PAIN DESCRIPTION - PAIN TYPE: TYPE: ACUTE PAIN

## 2019-11-26 ASSESSMENT — PAIN DESCRIPTION - ONSET: ONSET: GRADUAL

## 2019-11-26 ASSESSMENT — PAIN SCALES - GENERAL: PAINLEVEL_OUTOF10: 7

## 2019-11-27 LAB
EKG ATRIAL RATE: 69 BPM
EKG DIAGNOSIS: NORMAL
EKG P AXIS: 38 DEGREES
EKG P-R INTERVAL: 168 MS
EKG Q-T INTERVAL: 396 MS
EKG QRS DURATION: 76 MS
EKG QTC CALCULATION (BAZETT): 424 MS
EKG R AXIS: -12 DEGREES
EKG T AXIS: 37 DEGREES
EKG VENTRICULAR RATE: 69 BPM

## 2020-02-26 ENCOUNTER — APPOINTMENT (OUTPATIENT)
Dept: GENERAL RADIOLOGY | Age: 80
End: 2020-02-26
Payer: MEDICARE

## 2020-02-26 ENCOUNTER — HOSPITAL ENCOUNTER (EMERGENCY)
Age: 80
Discharge: HOME | End: 2020-02-26
Attending: EMERGENCY MEDICINE
Payer: MEDICARE

## 2020-02-26 ENCOUNTER — APPOINTMENT (OUTPATIENT)
Dept: CT IMAGING | Age: 80
End: 2020-02-26
Payer: MEDICARE

## 2020-02-26 VITALS
OXYGEN SATURATION: 99 % | HEART RATE: 62 BPM | SYSTOLIC BLOOD PRESSURE: 146 MMHG | TEMPERATURE: 98.4 F | RESPIRATION RATE: 19 BRPM | DIASTOLIC BLOOD PRESSURE: 67 MMHG

## 2020-02-26 LAB
A/G RATIO: 1.1 (ref 1.1–2.2)
ALBUMIN SERPL-MCNC: 4 G/DL (ref 3.4–5)
ALP BLD-CCNC: 82 U/L (ref 40–129)
ALT SERPL-CCNC: 18 U/L (ref 10–40)
ANION GAP SERPL CALCULATED.3IONS-SCNC: 10 MMOL/L (ref 3–16)
AST SERPL-CCNC: 14 U/L (ref 15–37)
BASE EXCESS VENOUS: 4.3 MMOL/L (ref -2–3)
BASOPHILS ABSOLUTE: 0.1 K/UL (ref 0–0.2)
BASOPHILS RELATIVE PERCENT: 1 %
BILIRUB SERPL-MCNC: 0.5 MG/DL (ref 0–1)
BILIRUBIN URINE: NEGATIVE
BLOOD, URINE: NEGATIVE
BUN BLDV-MCNC: 17 MG/DL (ref 7–20)
CALCIUM SERPL-MCNC: 11 MG/DL (ref 8.3–10.6)
CARBOXYHEMOGLOBIN: 1.1 % (ref 0–1.5)
CHLORIDE BLD-SCNC: 102 MMOL/L (ref 99–110)
CLARITY: CLEAR
CO2: 28 MMOL/L (ref 21–32)
COLOR: YELLOW
CREAT SERPL-MCNC: 1.1 MG/DL (ref 0.6–1.2)
EKG ATRIAL RATE: 55 BPM
EKG DIAGNOSIS: NORMAL
EKG P AXIS: 34 DEGREES
EKG P-R INTERVAL: 138 MS
EKG Q-T INTERVAL: 428 MS
EKG QRS DURATION: 84 MS
EKG QTC CALCULATION (BAZETT): 409 MS
EKG R AXIS: -16 DEGREES
EKG T AXIS: 17 DEGREES
EKG VENTRICULAR RATE: 55 BPM
EOSINOPHILS ABSOLUTE: 0.1 K/UL (ref 0–0.6)
EOSINOPHILS RELATIVE PERCENT: 1.1 %
GFR AFRICAN AMERICAN: 58
GFR NON-AFRICAN AMERICAN: 48
GLOBULIN: 3.8 G/DL
GLUCOSE BLD-MCNC: 200 MG/DL (ref 70–99)
GLUCOSE URINE: NEGATIVE MG/DL
HCO3 VENOUS: 30.7 MMOL/L (ref 24–28)
HCT VFR BLD CALC: 36.7 % (ref 36–48)
HEMOGLOBIN, VEN, REDUCED: 69.7 %
HEMOGLOBIN: 11.9 G/DL (ref 12–16)
KETONES, URINE: NEGATIVE MG/DL
LACTIC ACID: 1.1 MMOL/L (ref 0.4–2)
LEUKOCYTE ESTERASE, URINE: NEGATIVE
LYMPHOCYTES ABSOLUTE: 1.8 K/UL (ref 1–5.1)
LYMPHOCYTES RELATIVE PERCENT: 21.6 %
MCH RBC QN AUTO: 25.3 PG (ref 26–34)
MCHC RBC AUTO-ENTMCNC: 32.4 G/DL (ref 31–36)
MCV RBC AUTO: 78.2 FL (ref 80–100)
METHEMOGLOBIN VENOUS: 0.7 % (ref 0–1.5)
MICROSCOPIC EXAMINATION: NORMAL
MONOCYTES ABSOLUTE: 0.8 K/UL (ref 0–1.3)
MONOCYTES RELATIVE PERCENT: 9.9 %
NEUTROPHILS ABSOLUTE: 5.4 K/UL (ref 1.7–7.7)
NEUTROPHILS RELATIVE PERCENT: 66.4 %
NITRITE, URINE: NEGATIVE
O2 SAT, VEN: 29 %
PCO2, VEN: 56.7 MMHG (ref 41–51)
PDW BLD-RTO: 22.8 % (ref 12.4–15.4)
PH UA: 7 (ref 5–8)
PH VENOUS: 7.35 (ref 7.35–7.45)
PLATELET # BLD: 222 K/UL (ref 135–450)
PMV BLD AUTO: 8.5 FL (ref 5–10.5)
PO2, VEN: 22.7 MMHG (ref 25–40)
POTASSIUM REFLEX MAGNESIUM: 4.5 MMOL/L (ref 3.5–5.1)
PRO-BNP: <5 PG/ML (ref 0–449)
PROTEIN UA: NEGATIVE MG/DL
RAPID INFLUENZA  B AGN: NEGATIVE
RAPID INFLUENZA A AGN: NEGATIVE
RBC # BLD: 4.69 M/UL (ref 4–5.2)
SODIUM BLD-SCNC: 140 MMOL/L (ref 136–145)
SPECIFIC GRAVITY UA: 1.01 (ref 1–1.03)
TCO2 CALC VENOUS: 32 MMOL/L
TOTAL PROTEIN: 7.8 G/DL (ref 6.4–8.2)
TROPONIN: <0.01 NG/ML
URINE TYPE: NORMAL
UROBILINOGEN, URINE: 0.2 E.U./DL
WBC # BLD: 8.2 K/UL (ref 4–11)

## 2020-02-26 PROCEDURE — 87804 INFLUENZA ASSAY W/OPTIC: CPT

## 2020-02-26 PROCEDURE — 82803 BLOOD GASES ANY COMBINATION: CPT

## 2020-02-26 PROCEDURE — 81003 URINALYSIS AUTO W/O SCOPE: CPT

## 2020-02-26 PROCEDURE — 80053 COMPREHEN METABOLIC PANEL: CPT

## 2020-02-26 PROCEDURE — 83605 ASSAY OF LACTIC ACID: CPT

## 2020-02-26 PROCEDURE — 71045 X-RAY EXAM CHEST 1 VIEW: CPT

## 2020-02-26 PROCEDURE — 36569 INSJ PICC 5 YR+ W/O IMAGING: CPT

## 2020-02-26 PROCEDURE — 84484 ASSAY OF TROPONIN QUANT: CPT

## 2020-02-26 PROCEDURE — 6360000002 HC RX W HCPCS: Performed by: EMERGENCY MEDICINE

## 2020-02-26 PROCEDURE — 83880 ASSAY OF NATRIURETIC PEPTIDE: CPT

## 2020-02-26 PROCEDURE — 93005 ELECTROCARDIOGRAM TRACING: CPT | Performed by: EMERGENCY MEDICINE

## 2020-02-26 PROCEDURE — 85025 COMPLETE CBC W/AUTO DIFF WBC: CPT

## 2020-02-26 PROCEDURE — 70450 CT HEAD/BRAIN W/O DYE: CPT

## 2020-02-26 PROCEDURE — 2580000003 HC RX 258: Performed by: EMERGENCY MEDICINE

## 2020-02-26 PROCEDURE — 2500000003 HC RX 250 WO HCPCS: Performed by: EMERGENCY MEDICINE

## 2020-02-26 PROCEDURE — 99285 EMERGENCY DEPT VISIT HI MDM: CPT

## 2020-02-26 PROCEDURE — 36415 COLL VENOUS BLD VENIPUNCTURE: CPT

## 2020-02-26 PROCEDURE — C1751 CATH, INF, PER/CENT/MIDLINE: HCPCS

## 2020-02-26 PROCEDURE — 71250 CT THORAX DX C-: CPT

## 2020-02-26 RX ORDER — 0.9 % SODIUM CHLORIDE 0.9 %
500 INTRAVENOUS SOLUTION INTRAVENOUS ONCE
Status: DISCONTINUED | OUTPATIENT
Start: 2020-02-26 | End: 2020-02-26

## 2020-02-26 RX ORDER — LIDOCAINE HYDROCHLORIDE 10 MG/ML
5 INJECTION, SOLUTION EPIDURAL; INFILTRATION; INTRACAUDAL; PERINEURAL ONCE
Status: COMPLETED | OUTPATIENT
Start: 2020-02-26 | End: 2020-02-26

## 2020-02-26 RX ORDER — LEVOFLOXACIN 500 MG/1
500 TABLET, FILM COATED ORAL DAILY
Qty: 7 TABLET | Refills: 0 | Status: SHIPPED | OUTPATIENT
Start: 2020-02-26 | End: 2020-03-04

## 2020-02-26 RX ADMIN — LIDOCAINE HYDROCHLORIDE 5 ML: 10 INJECTION, SOLUTION EPIDURAL; INFILTRATION; INTRACAUDAL; PERINEURAL at 14:11

## 2020-02-26 NOTE — ED PROVIDER NOTES
[address]          ATTENDING PHYSICIAN NOTE       Date of evaluation: 2/26/2020    Chief Complaint     Cough and Altered Mental Status      History of Present Illness     Radha Lockhart is a 70 y.o. female who presents with cough congestion and confusion. According to her caretaker patient's been confused over the past 2 to 3 days and has had a cough that is been nonproductive. Patient unable to provide me any history. 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 She has a past surgical history that includes Hysterectomy (2011) and other surgical history (10/01/2013). Her family history is not on file. She reports that she has never smoked. She has never used smokeless tobacco. She reports that she does not drink alcohol or use drugs. Medications     Previous Medications    ACETAMINOPHEN (TYLENOL) 325 MG TABLET    Take 2 tablets by mouth every 4 hours as needed for Pain    ASPIRIN 325 MG EC TABLET    Take 325 mg by mouth daily    ATORVASTATIN (LIPITOR) 80 MG TABLET    Take 1 tablet by mouth nightly.     DEXTROMETHORPHAN-GUAIFENESIN (CORICIDIN HBP CONGESTION/COUGH)  MG CAPS    Take 1 capsule by mouth every 6 hours as needed (Congestion, cough)    FAMOTIDINE (PEPCID) 20 MG TABLET    Take 1 tablet by mouth 2 times daily    FUROSEMIDE (LASIX) 20 MG TABLET    Take 1 tablet by mouth daily    GLIPIZIDE (GLUCOTROL) 2.5 MG ER TABLET    Take 2.5 mg by mouth daily    LISINOPRIL (PRINIVIL;ZESTRIL) 10 MG TABLET    Take 10 mg by mouth daily. METOPROLOL TARTRATE (LOPRESSOR) 25 MG TABLET    Take 0.5 tablets by mouth 2 times daily    RISPERIDONE (RISPERDAL) 2 MG TABLET    Take 1 tablet by mouth daily. Allergies     She has No Known Allergies. Physical Exam     INITIAL VITALS: BP: (!) 132/112, Temp: 98.4 °F (36.9 °C), Pulse: 63, Resp: 20, SpO2: 95 %   Physical Exam  Vitals signs and nursing note reviewed. Constitutional:       General: She is not in acute distress. Appearance: She is well-developed. She is not diaphoretic. HENT:      Head: Normocephalic. Nose: Nose normal.      Mouth/Throat:      Mouth: Mucous membranes are moist.   Eyes:      Pupils: Pupils are equal, round, and reactive to light. Comments: Dried yellowish drainage around both eyes. Neck:      Musculoskeletal: Neck supple. Cardiovascular:      Rate and Rhythm: Normal rate and regular rhythm. Heart sounds: Normal heart sounds. Pulmonary:      Breath sounds: Normal breath sounds. No rales. Abdominal:      General: Bowel sounds are normal. There is no distension. Palpations: Abdomen is soft. Tenderness: There is no abdominal tenderness. There is no guarding. Musculoskeletal: Normal range of motion. General: No swelling. Lymphadenopathy:      Cervical: No cervical adenopathy. Skin:     General: Skin is warm and dry. Neurological:      Mental Status: She is alert. Comments: Patient confused and tells me her age of 79.   She cannot tell me the date or that she is at [facility]  She is moving all extremities well         Diagnostic Results     EKG normal sinus rhythm with leftward axis no ST or T wave Lymphocytes Absolute 1.8 1.0 - 5.1 K/uL    Monocytes Absolute 0.8 0.0 - 1.3 K/uL    Eosinophils Absolute 0.1 0.0 - 0.6 K/uL    Basophils Absolute 0.1 0.0 - 0.2 K/uL   Comprehensive Metabolic Panel w/ Reflex to MG   Result Value Ref Range    Sodium 140 136 - 145 mmol/L    Potassium reflex Magnesium 4.5 3.5 - 5.1 mmol/L    Chloride 102 99 - 110 mmol/L    CO2 28 21 - 32 mmol/L    Anion Gap 10 3 - 16    Glucose 200 (H) 70 - 99 mg/dL    BUN 17 7 - 20 mg/dL    CREATININE 1.1 0.6 - 1.2 mg/dL    GFR Non- 48 (A) >60    GFR  58 (A) >60    Calcium 11.0 (H) 8.3 - 10.6 mg/dL    Total Protein 7.8 6.4 - 8.2 g/dL    Alb 4.0 3.4 - 5.0 g/dL    Albumin/Globulin Ratio 1.1 1.1 - 2.2    Total Bilirubin 0.5 0.0 - 1.0 mg/dL    Alkaline Phosphatase 82 40 - 129 U/L    ALT 18 10 - 40 U/L    AST 14 (L) 15 - 37 U/L    Globulin 3.8 g/dL   Troponin   Result Value Ref Range    Troponin <0.01 <0.01 ng/mL   Brain Natriuretic Peptide   Result Value Ref Range    Pro-BNP <5 0 - 449 pg/mL   Urinalysis, reflex to microscopic   Result Value Ref Range    Color, UA Yellow Straw/Yellow    Clarity, UA Clear Clear    Glucose, Ur Negative Negative mg/dL    Bilirubin Urine Negative Negative    Ketones, Urine Negative Negative mg/dL    Specific Gravity, UA 1.015 1.005 - 1.030    Blood, Urine Negative Negative    pH, UA 7.0 5.0 - 8.0    Protein, UA Negative Negative mg/dL    Urobilinogen, Urine 0.2 <2.0 E.U./dL    Nitrite, Urine Negative Negative    Leukocyte Esterase, Urine Negative Negative    Microscopic Examination Not Indicated     Urine Type NotGiven    Lactic Acid, Plasma   Result Value Ref Range    Lactic Acid 1.1 0.4 - 2.0 mmol/L   Blood Gas, Venous   Result Value Ref Range    pH, Mookie 7.353 7.350 - 7.450    pCO2, Mookie 56.7 (H) 41.0 - 51.0 mmHg    pO2, Mookie 22.7 (L) 25.0 - 40.0 mmHg    HCO3, Venous 30.7 (H) 24.0 - 28.0 mmol/L    Base Excess, Mookie 4.3 (H) -2.0 - 3.0 mmol/L    O2 Sat, Mookie 29 Not established %    Carboxyhemoglobin longer functioning therefore patient was started on Levaquin p.o. and then recommended follow-up with her family physician. I did talk to her caregiver who was in agreement with this plan. Clinical Impression     1. Altered mental status, unspecified altered mental status type    2. Cough    3. Pneumonia due to organism        Disposition     PATIENT REFERRED TO:  No follow-up provider specified. DISCHARGE MEDICATIONS:  New Prescriptions    LEVOFLOXACIN (LEVAQUIN) 500 MG TABLET    Take 1 tablet by mouth daily for 7 days       DISPOSITION discharge home.          Nereyda Bills MD  02/26/20 1831

## 2020-02-26 NOTE — ED NOTES
Pt D/C ambulatory. Went over D/C instructions and medications with pt, pt verbalized understanding and all questions were answered.  RENETTA Stover RN  02/26/20 [id number]

## 2020-02-26 NOTE — PROCEDURES
MIDLINE   PROCEDURE   NOTE  Chart reviewed for allergies, diagnosis, labs, known contraindications, reason for line placement and planned length of treatment. Insertion procedure discussed with patient/family member. Informed consent not required for Midline placement. Three patient identifiers - Patient name,   and MRN -  completed &  confirmed verbally. Hat, mask and eye shield donned. Midline site scrubbed with Chloraprep  One-Step applicator  for 30 seconds x 1. Hand Hygiene  performed with 3% Chlorhexidine surgical scrub x1 min prior to  Sterile gloves,   sterile gown being donned. Patient draped using maximal sterile barrier technique ( head to toe ). Midline site scrubbed a 2nd time with Chloraprep One-Step applicator x 30 sec. Vein located by [employer] and site marked with sterile pen. Midline inserted. Positive brisk blood return obtained Midline flushed with 10 mls  0.9% Sterile Sodium Chloride. Midline flushes easily with no resistance. Valve placed on all lumens followed by Alcohol Swab Caps on end of each. Skin prep applied to site. Bio-patch in place. Catheter secured with non-sutured locking device per hospital protocol. Sterile Tegaderm applied over Midline site. Sterile field maintained during procedure. Guide wire accounted for post procedure. Pt. Response to procedure, tolerated well. Appearance of site: Clean dry and intact without bleeding or edema. All edges of Tegaderm occlusive. Site marked with date and initials of RN placing line. Top 2 side rails in up position, call button in reach, RN notified of all of the above.

## 2020-09-29 ENCOUNTER — APPOINTMENT (OUTPATIENT)
Dept: GENERAL RADIOLOGY | Age: 80
End: 2020-09-29
Payer: MEDICARE

## 2020-09-29 ENCOUNTER — HOSPITAL ENCOUNTER (EMERGENCY)
Age: 80
Discharge: HOME | End: 2020-09-29
Attending: EMERGENCY MEDICINE
Payer: MEDICARE

## 2020-09-29 VITALS
SYSTOLIC BLOOD PRESSURE: 122 MMHG | WEIGHT: 274 LBS | RESPIRATION RATE: 20 BRPM | OXYGEN SATURATION: 94 % | HEIGHT: 67 IN | DIASTOLIC BLOOD PRESSURE: 82 MMHG | HEART RATE: 55 BPM | BODY MASS INDEX: 43 KG/M2 | TEMPERATURE: 98.2 F

## 2020-09-29 LAB
A/G RATIO: 1.2 (ref 1.1–2.2)
ALBUMIN SERPL-MCNC: 4.1 G/DL (ref 3.4–5)
ALP BLD-CCNC: 101 U/L (ref 40–129)
ALT SERPL-CCNC: 22 U/L (ref 10–40)
ANION GAP SERPL CALCULATED.3IONS-SCNC: 10 MMOL/L (ref 3–16)
AST SERPL-CCNC: 20 U/L (ref 15–37)
BASE EXCESS VENOUS: 4.5 MMOL/L (ref -2–3)
BASOPHILS ABSOLUTE: 0.1 K/UL (ref 0–0.2)
BASOPHILS RELATIVE PERCENT: 1.1 %
BILIRUB SERPL-MCNC: 0.4 MG/DL (ref 0–1)
BILIRUBIN URINE: NEGATIVE
BLOOD, URINE: NEGATIVE
BUN BLDV-MCNC: 14 MG/DL (ref 7–20)
CALCIUM SERPL-MCNC: 10.7 MG/DL (ref 8.3–10.6)
CARBOXYHEMOGLOBIN: 1.3 % (ref 0–1.5)
CHLORIDE BLD-SCNC: 93 MMOL/L (ref 99–110)
CHP ED QC CHECK: NORMAL
CLARITY: CLEAR
CO2: 29 MMOL/L (ref 21–32)
COLOR: YELLOW
CREAT SERPL-MCNC: 1 MG/DL (ref 0.6–1.2)
EOSINOPHILS ABSOLUTE: 0.2 K/UL (ref 0–0.6)
EOSINOPHILS RELATIVE PERCENT: 2.4 %
EPITHELIAL CELLS, UA: ABNORMAL /HPF (ref 0–5)
GFR AFRICAN AMERICAN: >60
GFR NON-AFRICAN AMERICAN: 54
GLOBULIN: 3.3 G/DL
GLUCOSE BLD-MCNC: 457 MG/DL (ref 70–99)
GLUCOSE BLD-MCNC: 464 MG/DL
GLUCOSE BLD-MCNC: 464 MG/DL (ref 70–99)
GLUCOSE BLD-MCNC: 504 MG/DL (ref 70–99)
GLUCOSE URINE: >=1000 MG/DL
HCO3 VENOUS: 30.7 MMOL/L (ref 24–28)
HCT VFR BLD CALC: 36 % (ref 36–48)
HEMOGLOBIN, VEN, REDUCED: 58.5 %
HEMOGLOBIN: 11.7 G/DL (ref 12–16)
KETONES, URINE: NEGATIVE MG/DL
LACTIC ACID: 2 MMOL/L (ref 0.4–2)
LEUKOCYTE ESTERASE, URINE: ABNORMAL
LYMPHOCYTES ABSOLUTE: 2 K/UL (ref 1–5.1)
LYMPHOCYTES RELATIVE PERCENT: 22.2 %
MCH RBC QN AUTO: 28.6 PG (ref 26–34)
MCHC RBC AUTO-ENTMCNC: 32.6 G/DL (ref 31–36)
MCV RBC AUTO: 87.8 FL (ref 80–100)
METHEMOGLOBIN VENOUS: 1 % (ref 0–1.5)
MICROSCOPIC EXAMINATION: YES
MONOCYTES ABSOLUTE: 0.8 K/UL (ref 0–1.3)
MONOCYTES RELATIVE PERCENT: 8.8 %
NEUTROPHILS ABSOLUTE: 6 K/UL (ref 1.7–7.7)
NEUTROPHILS RELATIVE PERCENT: 65.5 %
NITRITE, URINE: NEGATIVE
O2 SAT, VEN: 40 %
PCO2, VEN: 55.4 MMHG (ref 41–51)
PDW BLD-RTO: 14.8 % (ref 12.4–15.4)
PERFORMED ON: ABNORMAL
PERFORMED ON: ABNORMAL
PH UA: 6 (ref 5–8)
PH VENOUS: 7.36 (ref 7.35–7.45)
PLATELET # BLD: 248 K/UL (ref 135–450)
PMV BLD AUTO: 9.2 FL (ref 5–10.5)
PO2, VEN: 24 MMHG (ref 25–40)
POTASSIUM REFLEX MAGNESIUM: 3.6 MMOL/L (ref 3.5–5.1)
PROTEIN UA: NEGATIVE MG/DL
RBC # BLD: 4.1 M/UL (ref 4–5.2)
RBC UA: ABNORMAL /HPF (ref 0–4)
SODIUM BLD-SCNC: 132 MMOL/L (ref 136–145)
SPECIFIC GRAVITY UA: 1.01 (ref 1–1.03)
TCO2 CALC VENOUS: 32 MMOL/L
TOTAL PROTEIN: 7.4 G/DL (ref 6.4–8.2)
URINE REFLEX TO CULTURE: ABNORMAL
URINE TYPE: ABNORMAL
UROBILINOGEN, URINE: 0.2 E.U./DL
WBC # BLD: 9.2 K/UL (ref 4–11)
WBC UA: ABNORMAL /HPF (ref 0–5)
YEAST: PRESENT /HPF

## 2020-09-29 PROCEDURE — 99284 EMERGENCY DEPT VISIT MOD MDM: CPT

## 2020-09-29 PROCEDURE — 81001 URINALYSIS AUTO W/SCOPE: CPT

## 2020-09-29 PROCEDURE — 2580000003 HC RX 258: Performed by: PHYSICIAN ASSISTANT

## 2020-09-29 PROCEDURE — 85025 COMPLETE CBC W/AUTO DIFF WBC: CPT

## 2020-09-29 PROCEDURE — 71045 X-RAY EXAM CHEST 1 VIEW: CPT

## 2020-09-29 PROCEDURE — 80053 COMPREHEN METABOLIC PANEL: CPT

## 2020-09-29 PROCEDURE — 82803 BLOOD GASES ANY COMBINATION: CPT

## 2020-09-29 PROCEDURE — 83605 ASSAY OF LACTIC ACID: CPT

## 2020-09-29 RX ORDER — FLUCONAZOLE 150 MG/1
150 TABLET ORAL ONCE
Status: DISCONTINUED | OUTPATIENT
Start: 2020-09-29 | End: 2020-09-29 | Stop reason: HOSPADM

## 2020-09-29 RX ORDER — SODIUM CHLORIDE, SODIUM LACTATE, POTASSIUM CHLORIDE, CALCIUM CHLORIDE 600; 310; 30; 20 MG/100ML; MG/100ML; MG/100ML; MG/100ML
1000 INJECTION, SOLUTION INTRAVENOUS ONCE
Status: COMPLETED | OUTPATIENT
Start: 2020-09-29 | End: 2020-09-29

## 2020-09-29 RX ADMIN — SODIUM CHLORIDE, POTASSIUM CHLORIDE, SODIUM LACTATE AND CALCIUM CHLORIDE 1000 ML: 600; 310; 30; 20 INJECTION, SOLUTION INTRAVENOUS at 17:34

## 2020-09-29 ASSESSMENT — ENCOUNTER SYMPTOMS
ABDOMINAL PAIN: 0
SHORTNESS OF BREATH: 0

## 2020-09-29 ASSESSMENT — PAIN SCALES - GENERAL: PAINLEVEL_OUTOF10: 0

## 2020-09-29 NOTE — ED NOTES
Dc inst given to pt and caregiver and verb understanding.   Dc'd via wc to private auto with caregiver     Margaux Lay RN  09/29/20 1931

## 2020-09-29 NOTE — ED PROVIDER NOTES
ED Attending Attestation Note     Date of evaluation: 9/29/2020    This patient was seen by the REANNA. I have seen and examined the patient, agree with the workup, evaluation, management and diagnosis. The care plan has been discussed. I was present for any procedures performed in the REANNA's note and have made edits to the note where appropriate. My assessment reveals 68 y.o. female with history of poorly controlled diabetes and multiple other comorbidities presenting for hyperglycemia. Patient was briefly left at home alone due to a family emergency in her home care worker was not there. She otherwise states she feels safe at home and family has confirmed that they are typically present. She has hyperglycemia in the 400s but is otherwise asymptomatic. Patient given IV fluids with stable hyperglycemia. No anion gap, acidosis, or other suggestion of DKA/HHS. Mental status is reassuring. Family feels comfortable with her return home at this time. She does not typically check her sugars at home but was encouraged to do so. Advised close PCP follow-up to discuss diabetic medications as she is on glipizide, which we will not make adjustments to at this time from the emergency department. Discharged in stable condition.           Yasmin Weeks MD  09/29/20 5622

## 2020-09-29 NOTE — ED PROVIDER NOTES
[address] ENCOUNTER          PHYSICIAN ASSISTANT NOTE       Date of evaluation: 9/29/2020    Chief Complaint     Hyperglycemia      History of Present Illness     HPI: Adama Dogn is a 68 y.o. female with history of PE (not anticoaguled), CVA, HTN, HLD, diabetes who presents to the emergency department with hyperglycemia. The patient apparently accidentally used her life alert button so EMS was called. She was found at home on the ground in a puddle of urine. They checked her blood sugar and it was found to be in the 460s. The patient is confused, which is her baseline per her caretaker, though has no acute complaints. She specifically denies any dizziness, headache, chest pain, abdominal pain or shortness of breath. The patient lives at home with her daughter and has several caretakers per day. She also has a daughter who lives in [place] who checks on her every weekend. With the exception of the above, there are no aggravating or alleviating factors. Review of Systems     Review of Systems   Constitutional: Negative for chills and fever. Respiratory: Negative for shortness of breath. Cardiovascular: Negative for chest pain. Gastrointestinal: Negative for abdominal pain. Neurological: Negative for dizziness and headaches. As stated above, all other systems reviewed and are otherwise negative.      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 She has a past surgical history that includes Hysterectomy (2011) and other surgical history (10/01/2013). Her family history is not on file. She reports that she has never smoked. She has never used smokeless tobacco. She reports that she does not drink alcohol or use drugs. Medications     Previous Medications    ACETAMINOPHEN (TYLENOL) 325 MG TABLET    Take 2 tablets by mouth every 4 hours as needed for Pain    ASPIRIN 325 MG EC TABLET    Take 325 mg by mouth daily    ATORVASTATIN (LIPITOR) 80 MG TABLET    Take 1 tablet by mouth nightly. DEXTROMETHORPHAN-GUAIFENESIN (CORICIDIN HBP CONGESTION/COUGH)  MG CAPS    Take 1 capsule by mouth every 6 hours as needed (Congestion, cough)    FAMOTIDINE (PEPCID) 20 MG TABLET    Take 1 tablet by mouth 2 times daily    FUROSEMIDE (LASIX) 20 MG TABLET    Take 1 tablet by mouth daily    GLIPIZIDE (GLUCOTROL) 2.5 MG ER TABLET    Take 2.5 mg by mouth daily    LISINOPRIL (PRINIVIL;ZESTRIL) 10 MG TABLET    Take 10 mg by mouth daily. METOPROLOL TARTRATE (LOPRESSOR) 25 MG TABLET    Take 0.5 tablets by mouth 2 times daily    RISPERIDONE (RISPERDAL) 2 MG TABLET    Take 1 tablet by mouth daily. Allergies     She has No Known Allergies. Physical Exam     INITIAL VITALS: BP: 120/65, Temp: 98.2 °F (36.8 °C), Pulse: 67, Resp: 22, SpO2: 100 %  Physical Exam  Constitutional:       Appearance: Normal appearance. HENT:      Head: Normocephalic and atraumatic. Nose: Nose normal.      Mouth/Throat:      Mouth: Mucous membranes are dry. Pharynx: Oropharynx is clear. Eyes:      Extraocular Movements: Extraocular movements intact. Neck:      Musculoskeletal: Normal range of motion. Cardiovascular:      Rate and Rhythm: Normal rate. Pulses: Normal pulses.    Pulmonary:      Effort: Pulmonary effort is normal. No DECISION MAKING / ASSESSMENT / PLAN     Vitals:    09/29/20 1519 09/29/20 1530 09/29/20 1600   BP: 120/65 88/76 116/64   Pulse: 67 64 58   Resp: 22 29 28   Temp: 98.2 °F (36.8 °C)     TempSrc: Oral     SpO2: 100% 100% 99%   Weight: 274 lb (124.3 kg)     Height: 5' 6.5\" (1.689 m)         Shea Lazaro is a 68 y.o. female who presents to the emergency department with hyperglycemia. The patient was found at home by EMS after she accidentally used her life alert button. Her blood sugar was found to be 460 so she was transported to the hospital.  The patient herself has no complaints, is slightly confused at baseline (per caretaker) and feels that she was abducted and taken to the hospital.  The patient has remained hemodynamically stable throughout their stay in the emergency department, and appears well overall. She is in no respiratory distress and able to speak in complete sentences. Patient's blood sugar here is elevated to 457. Her urinalysis shows negative ketones and greater than 1000 glucose. Basic lab work is pending at this time. Her chest x-ray is clear. She was given a liter of fluid to start since her potassium is not back and therefore will not be administered insulin. The  in the emergency department spoke with the patient and both of her daughters who feel that she is appropriate and appropriately cared for at home. Her daughter was at dialysis which is why she was not home, and the patient has a significant history of urinary incontinence which is likely why she was found in a puddle of urine. They do not feel that she requires admission for placement and feel that they are able to appropriately care for her at home. Should her work-up be unremarkable she will be appropriate for discharge back to her home. At this time I am going off service and will be signing out care of the patient to my colleague Mireya Casillas PA-C for further care. Labs is/are still pending. Oncoming provider responsibilities include following up on pending labs/tests, reassessing patient, and appropriate disposition. Please see medical record for further management and medical decision making. The patient was evaluated by myself and the ED Attending Physician, Dr. Larry Cortes. All management and disposition plans were discussed and agreed upon. Clinical Impression     1. Hyperglycemia        This note was dictated using voice-recognition software, which occasionally leads to inadvertent typographic errors. Disposition     DISPOSITION  - Pending lab work.      IOANA Lobato  09/29/20 [id number]

## 2020-09-29 NOTE — ED NOTES
Pt resting in bed, 2/2 side rails up, fall precautions in place per Estes fall scale. Pt appears comfortable, no distress noted at this time. IV intact, no complications noted. Dressing clean, dry and intact. Denies any needs at this time. Call light in reach.        Manny Mann RN  09/29/20 [id number]

## 2020-09-29 NOTE — ED TRIAGE NOTES
Pt brought to ED by [facility] for hyperglycemia. Pt accidentally hit her Med Alert necklace and EMS responded. They found patient soaked in urine and alone at home. Checked her BG which was 457. Pt brought to ED for evaluation. Pt denies any other symptoms such as dizziness, chest pain, or shortness of breath. On arrival to ED, pt was covered in urine. Linens and gown changed. Pure wick with absorbant pad placed. Urine sample collected via bed pan. Pt on telemetry monitor. EKG completed. IV started and blood drawn and sent to lab. Belongings bagged and at bedside, include robe, gown, cane, slippers, and purse. Pt states that she normally has an aide who stays with her through the day, but the aide had a family emergency to go to.

## 2020-09-29 NOTE — ED PROVIDER NOTES
[address] [facility] ENCOUNTER          PHYSICIAN ASSISTANT NOTE     Date of evaluation: 9/29/2020    ADDENDUM:      Care of this patient was assumed from [facility]. The patient was seen for Hyperglycemia  . The patient's initial evaluation and plan have been discussed with the prior provider who initially evaluated the patient. Nursing Notes, Past Medical Hx, Past Surgical Hx, Social Hx, Allergies, and Family Hx were all reviewed. Pending at turnover: labs and likely discharge home. Diagnostic Results     EKG       RADIOLOGY:  XR CHEST PORTABLE   Final Result   Impression: Minimal atelectasis of the right lung base.           LABS:   Results for orders placed or performed during the hospital encounter of 09/29/20   CBC Auto Differential   Result Value Ref Range    WBC 9.2 4.0 - 11.0 K/uL    RBC 4.10 4.00 - 5.20 M/uL    Hemoglobin 11.7 (L) 12.0 - 16.0 g/dL    Hematocrit 36.0 36.0 - 48.0 %    MCV 87.8 80.0 - 100.0 fL    MCH 28.6 26.0 - 34.0 pg    MCHC 32.6 31.0 - 36.0 g/dL    RDW 14.8 12.4 - 15.4 %    Platelets 251 090 - 396 K/uL    MPV 9.2 5.0 - 10.5 fL    Neutrophils % 65.5 %    Lymphocytes % 22.2 %    Monocytes % 8.8 %    Eosinophils % 2.4 %    Basophils % 1.1 %    Neutrophils Absolute 6.0 1.7 - 7.7 K/uL    Lymphocytes Absolute 2.0 1.0 - 5.1 K/uL    Monocytes Absolute 0.8 0.0 - 1.3 K/uL    Eosinophils Absolute 0.2 0.0 - 0.6 K/uL    Basophils Absolute 0.1 0.0 - 0.2 K/uL   Urinalysis Reflex to Culture    Specimen: Urine, clean catch   Result Value Ref Range    Color, UA Yellow Straw/Yellow    Clarity, UA Clear Clear    Glucose, Ur >=1000 (A) Negative mg/dL    Bilirubin Urine Negative Negative    Ketones, Urine Negative Negative mg/dL    Specific Gravity, UA 1.010 1.005 - 1.030    Blood, Urine Negative Negative    pH, UA 6.0 5.0 - 8.0    Protein, UA Negative Negative mg/dL    Urobilinogen, Urine 0.2 <2.0 E.U./dL    Nitrite, Urine Negative Negative    Leukocyte Esterase, Urine TRACE (A) Negative    Microscopic Examination YES     Urine Type Voided     Urine Reflex to Culture Not Indicated    Lactic Acid, Plasma   Result Value Ref Range    Lactic Acid 2.0 0.4 - 2.0 mmol/L   Blood Gas, Venous   Result Value Ref Range    pH, Mookie 7.362 7.350 - 7.450    pCO2, Mookie 55.4 (H) 41.0 - 51.0 mmHg    pO2, Mookie 24.0 (L) 25.0 - 40.0 mmHg    HCO3, Venous 30.7 (H) 24.0 - 28.0 mmol/L    Base Excess, Mookie 4.5 (H) -2.0 - 3.0 mmol/L    O2 Sat, Mookie 40 Not established %    Carboxyhemoglobin 1.3 0.0 - 1.5 %    MetHgb, Mookie 1.0 0.0 - 1.5 %    TC02 (Calc), Mookie 32 mmol/L    Hemoglobin, Mookie, Reduced 58.50 %   Comprehensive Metabolic Panel w/ Reflex to MG   Result Value Ref Range    Sodium 132 (L) 136 - 145 mmol/L    Potassium reflex Magnesium 3.6 3.5 - 5.1 mmol/L    Chloride 93 (L) 99 - 110 mmol/L    CO2 29 21 - 32 mmol/L    Anion Gap 10 3 - 16    Glucose 504 (H) 70 - 99 mg/dL    BUN 14 7 - 20 mg/dL    CREATININE 1.0 0.6 - 1.2 mg/dL    GFR Non-African American 54 (A) >60    GFR African American >60 >60    Calcium 10.7 (H) 8.3 - 10.6 mg/dL    Total Protein 7.4 6.4 - 8.2 g/dL    Alb 4.1 3.4 - 5.0 g/dL    Albumin/Globulin Ratio 1.2 1.1 - 2.2    Total Bilirubin 0.4 0.0 - 1.0 mg/dL    Alkaline Phosphatase 101 40 - 129 U/L    ALT 22 10 - 40 U/L    AST 20 15 - 37 U/L    Globulin 3.3 g/dL   Microscopic Urinalysis   Result Value Ref Range    WBC, UA 6-9 (A) 0 - 5 /HPF    RBC, UA None seen 0 - 4 /HPF    Epithelial Cells, UA 2-5 0 - 5 /HPF    Yeast, UA Present (A) None Seen /HPF   POCT Glucose   Result Value Ref Range    POC Glucose 457 (H) 70 - 99 mg/dl    Performed on ACCU-CHEK    POC Glucose   Result Value Ref Range    Glucose 464 mg/dL    QC OK? y    POCT Glucose   Result Value Ref Range    POC Glucose 464 (H) 70 - 99 mg/dl    Performed on ACCU-CHEK        RECENT VITALS:  BP: 116/64, Temp: 98.2 °F (36.8 °C), Pulse: 58, Resp: 28, SpO2: 99 %     Procedures         ED Course     The patient was given the following

## 2020-09-29 NOTE — ED NOTES
Stuck patient twice for IV access. Unsuccessful. Rut VALLEJO RN stuck patient 3 times, IV started, but no blood. Teresita ARIAS PCA to attempt blood draw.      Niranjan Gallardo RN  09/29/20 [id number]

## 2020-09-29 NOTE — CARE COORDINATION
Referred to patient for d/c planning per MD.  Per MD, patient found alone in home soaked in urine. Spoke to patient, spoke to daughter via phone, Josh Amezquita. Per patient she lives at home with daughter Cliff Cantor. Patient reports she is independent in ambulation, but requires assistance with most ADLs. Patient reports she has home health aides daily for 4 hours. Patient reports home health aide takes patient's car. Then states it is to transport daughter Cliff Cantor to chemo. Patient reports she Is non-compliant with her diet. States she checks her blood sugar 4 times a day. Call placed to daughter Cliff Cantor, message left. Call placed to daughter Josh Amezquita. She verified patient has home health aides and daughter Cliff Cantor lives. There. She reports she is POA for patient. She has given permission for the A to drive Cliff Cantor to HD (not chemo). She feels patient is safe to return home. She denies any needs at this time. Call placed to [facility] [facility].  is Marian Gan, [phone], however has left for the day. RN and MD updated. Per daughter, she can contact [facility] for transport home on d/c. A is Ryne Morales, [phone]. No other needs at this time.   Electronically signed by DWAIN Headley, CALLY on 9/29/2020 at 5:33 PM

## 2021-04-15 ENCOUNTER — APPOINTMENT (OUTPATIENT)
Dept: GENERAL RADIOLOGY | Age: 81
DRG: 872 | End: 2021-04-15
Payer: MEDICARE

## 2021-04-15 ENCOUNTER — APPOINTMENT (OUTPATIENT)
Dept: CT IMAGING | Age: 81
DRG: 872 | End: 2021-04-15
Payer: MEDICARE

## 2021-04-15 ENCOUNTER — HOSPITAL ENCOUNTER (INPATIENT)
Age: 81
LOS: 2 days | Discharge: HOME HEALTH | DRG: 872 | End: 2021-04-17
Attending: EMERGENCY MEDICINE | Admitting: INTERNAL MEDICINE
Payer: MEDICARE
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
## 2021-04-15 DIAGNOSIS — N30.00 ACUTE CYSTITIS WITHOUT HEMATURIA: ICD-10-CM

## 2021-04-15 PROBLEM — N39.0 UTI (URINARY TRACT INFECTION): Status: ACTIVE | Noted: 2021-04-15

## 2021-04-15 LAB
ANION GAP SERPL CALCULATED.3IONS-SCNC: 12 MMOL/L (ref 3–16)
BACTERIA: ABNORMAL /HPF
BASE EXCESS VENOUS: 7.1 MMOL/L (ref -2–3)
BASOPHILS ABSOLUTE: 0.2 K/UL (ref 0–0.2)
BASOPHILS RELATIVE PERCENT: 1.2 %
BILIRUBIN URINE: NEGATIVE
BLOOD, URINE: ABNORMAL
BUN BLDV-MCNC: 22 MG/DL (ref 7–20)
CALCIUM SERPL-MCNC: 11 MG/DL (ref 8.3–10.6)
CARBOXYHEMOGLOBIN: 0.8 % (ref 0–1.5)
CHLORIDE BLD-SCNC: 94 MMOL/L (ref 99–110)
CLARITY: ABNORMAL
CO2: 28 MMOL/L (ref 21–32)
COLOR: YELLOW
CREAT SERPL-MCNC: 1.2 MG/DL (ref 0.6–1.2)
EKG ATRIAL RATE: 90 BPM
EKG DIAGNOSIS: NORMAL
EKG P AXIS: 39 DEGREES
EKG P-R INTERVAL: 130 MS
EKG Q-T INTERVAL: 344 MS
EKG QRS DURATION: 62 MS
EKG QTC CALCULATION (BAZETT): 420 MS
EKG R AXIS: -11 DEGREES
EKG T AXIS: 17 DEGREES
EKG VENTRICULAR RATE: 90 BPM
EOSINOPHILS ABSOLUTE: 0 K/UL (ref 0–0.6)
EOSINOPHILS RELATIVE PERCENT: 0.2 %
EPITHELIAL CELLS, UA: ABNORMAL /HPF (ref 0–5)
GFR AFRICAN AMERICAN: 52
GFR NON-AFRICAN AMERICAN: 43
GLUCOSE BLD-MCNC: 245 MG/DL (ref 70–99)
GLUCOSE BLD-MCNC: 288 MG/DL (ref 70–99)
GLUCOSE BLD-MCNC: 308 MG/DL (ref 70–99)
GLUCOSE BLD-MCNC: 311 MG/DL (ref 70–99)
GLUCOSE BLD-MCNC: 481 MG/DL (ref 70–99)
GLUCOSE BLD-MCNC: 528 MG/DL (ref 70–99)
GLUCOSE URINE: 500 MG/DL
HCO3 VENOUS: 33 MMOL/L (ref 24–28)
HCT VFR BLD CALC: 34.9 % (ref 36–48)
HEMOGLOBIN: 11.6 G/DL (ref 12–16)
KETONES, URINE: NEGATIVE MG/DL
LEUKOCYTE ESTERASE, URINE: ABNORMAL
LYMPHOCYTES ABSOLUTE: 1.3 K/UL (ref 1–5.1)
LYMPHOCYTES RELATIVE PERCENT: 9 %
MCH RBC QN AUTO: 27.8 PG (ref 26–34)
MCHC RBC AUTO-ENTMCNC: 33.4 G/DL (ref 31–36)
MCV RBC AUTO: 83.2 FL (ref 80–100)
METHEMOGLOBIN VENOUS: 0.8 % (ref 0–1.5)
MICROSCOPIC EXAMINATION: YES
MONOCYTES ABSOLUTE: 1.2 K/UL (ref 0–1.3)
MONOCYTES RELATIVE PERCENT: 8.2 %
NEUTROPHILS ABSOLUTE: 11.7 K/UL (ref 1.7–7.7)
NEUTROPHILS RELATIVE PERCENT: 81.4 %
NITRITE, URINE: NEGATIVE
O2 SAT, VEN: 98 %
PCO2, VEN: 52 MMHG (ref 41–51)
PDW BLD-RTO: 15.8 % (ref 12.4–15.4)
PERFORMED ON: ABNORMAL
PH UA: 6 (ref 5–8)
PH VENOUS: 7.41 (ref 7.35–7.45)
PLATELET # BLD: 277 K/UL (ref 135–450)
PLATELET SLIDE REVIEW: ADEQUATE
PMV BLD AUTO: 9.4 FL (ref 5–10.5)
PO2, VEN: 122 MMHG (ref 25–40)
POTASSIUM SERPL-SCNC: 3.6 MMOL/L (ref 3.5–5.1)
PROTEIN UA: NEGATIVE MG/DL
RBC # BLD: 4.19 M/UL (ref 4–5.2)
RBC UA: ABNORMAL /HPF (ref 0–4)
SODIUM BLD-SCNC: 134 MMOL/L (ref 136–145)
SPECIFIC GRAVITY UA: <=1.005 (ref 1–1.03)
TCO2 CALC VENOUS: 35 MMOL/L
TROPONIN: <0.01 NG/ML
URINE TYPE: ABNORMAL
UROBILINOGEN, URINE: 0.2 E.U./DL
WBC # BLD: 14.4 K/UL (ref 4–11)
WBC UA: ABNORMAL /HPF (ref 0–5)

## 2021-04-15 PROCEDURE — 96365 THER/PROPH/DIAG IV INF INIT: CPT

## 2021-04-15 PROCEDURE — 70450 CT HEAD/BRAIN W/O DYE: CPT

## 2021-04-15 PROCEDURE — 2580000003 HC RX 258: Performed by: EMERGENCY MEDICINE

## 2021-04-15 PROCEDURE — 96375 TX/PRO/DX INJ NEW DRUG ADDON: CPT

## 2021-04-15 PROCEDURE — 36415 COLL VENOUS BLD VENIPUNCTURE: CPT

## 2021-04-15 PROCEDURE — 72170 X-RAY EXAM OF PELVIS: CPT

## 2021-04-15 PROCEDURE — 85025 COMPLETE CBC W/AUTO DIFF WBC: CPT

## 2021-04-15 PROCEDURE — 93005 ELECTROCARDIOGRAM TRACING: CPT | Performed by: EMERGENCY MEDICINE

## 2021-04-15 PROCEDURE — 73560 X-RAY EXAM OF KNEE 1 OR 2: CPT

## 2021-04-15 PROCEDURE — 73610 X-RAY EXAM OF ANKLE: CPT

## 2021-04-15 PROCEDURE — 6370000000 HC RX 637 (ALT 250 FOR IP): Performed by: INTERNAL MEDICINE

## 2021-04-15 PROCEDURE — 87077 CULTURE AEROBIC IDENTIFY: CPT

## 2021-04-15 PROCEDURE — 73590 X-RAY EXAM OF LOWER LEG: CPT

## 2021-04-15 PROCEDURE — 80048 BASIC METABOLIC PNL TOTAL CA: CPT

## 2021-04-15 PROCEDURE — 1200000000 HC SEMI PRIVATE

## 2021-04-15 PROCEDURE — 81001 URINALYSIS AUTO W/SCOPE: CPT

## 2021-04-15 PROCEDURE — 72125 CT NECK SPINE W/O DYE: CPT

## 2021-04-15 PROCEDURE — 99285 EMERGENCY DEPT VISIT HI MDM: CPT

## 2021-04-15 PROCEDURE — 83036 HEMOGLOBIN GLYCOSYLATED A1C: CPT

## 2021-04-15 PROCEDURE — 71045 X-RAY EXAM CHEST 1 VIEW: CPT

## 2021-04-15 PROCEDURE — 6370000000 HC RX 637 (ALT 250 FOR IP): Performed by: EMERGENCY MEDICINE

## 2021-04-15 PROCEDURE — 96361 HYDRATE IV INFUSION ADD-ON: CPT

## 2021-04-15 PROCEDURE — 82803 BLOOD GASES ANY COMBINATION: CPT

## 2021-04-15 PROCEDURE — 6360000002 HC RX W HCPCS: Performed by: INTERNAL MEDICINE

## 2021-04-15 PROCEDURE — 84484 ASSAY OF TROPONIN QUANT: CPT

## 2021-04-15 PROCEDURE — 87186 SC STD MICRODIL/AGAR DIL: CPT

## 2021-04-15 PROCEDURE — 87086 URINE CULTURE/COLONY COUNT: CPT

## 2021-04-15 PROCEDURE — 2580000003 HC RX 258: Performed by: INTERNAL MEDICINE

## 2021-04-15 PROCEDURE — 6360000002 HC RX W HCPCS: Performed by: EMERGENCY MEDICINE

## 2021-04-15 RX ORDER — DEXTROSE MONOHYDRATE 25 G/50ML
12.5 INJECTION, SOLUTION INTRAVENOUS PRN
Status: DISCONTINUED | OUTPATIENT
Start: 2021-04-15 | End: 2021-04-17 | Stop reason: HOSPADM

## 2021-04-15 RX ORDER — ATORVASTATIN CALCIUM 40 MG/1
40 TABLET, FILM COATED ORAL DAILY
Status: ON HOLD | COMMUNITY
End: 2021-05-14 | Stop reason: CLARIF

## 2021-04-15 RX ORDER — SODIUM CHLORIDE 9 MG/ML
25 INJECTION, SOLUTION INTRAVENOUS PRN
Status: DISCONTINUED | OUTPATIENT
Start: 2021-04-15 | End: 2021-04-17 | Stop reason: HOSPADM

## 2021-04-15 RX ORDER — SODIUM CHLORIDE 0.9 % (FLUSH) 0.9 %
5-40 SYRINGE (ML) INJECTION PRN
Status: DISCONTINUED | OUTPATIENT
Start: 2021-04-15 | End: 2021-04-17 | Stop reason: HOSPADM

## 2021-04-15 RX ORDER — ACETAMINOPHEN 650 MG/1
650 SUPPOSITORY RECTAL EVERY 6 HOURS PRN
Status: DISCONTINUED | OUTPATIENT
Start: 2021-04-15 | End: 2021-04-17 | Stop reason: HOSPADM

## 2021-04-15 RX ORDER — DEXTROSE MONOHYDRATE 50 MG/ML
100 INJECTION, SOLUTION INTRAVENOUS PRN
Status: DISCONTINUED | OUTPATIENT
Start: 2021-04-15 | End: 2021-04-17 | Stop reason: HOSPADM

## 2021-04-15 RX ORDER — ACETAMINOPHEN 325 MG/1
650 TABLET ORAL EVERY 4 HOURS PRN
Status: ON HOLD | COMMUNITY
End: 2021-05-14 | Stop reason: CLARIF

## 2021-04-15 RX ORDER — PROMETHAZINE HYDROCHLORIDE 12.5 MG/1
12.5 TABLET ORAL EVERY 6 HOURS PRN
Status: DISCONTINUED | OUTPATIENT
Start: 2021-04-15 | End: 2021-04-17 | Stop reason: HOSPADM

## 2021-04-15 RX ORDER — SODIUM CHLORIDE 0.9 % (FLUSH) 0.9 %
5-40 SYRINGE (ML) INJECTION EVERY 12 HOURS SCHEDULED
Status: DISCONTINUED | OUTPATIENT
Start: 2021-04-15 | End: 2021-04-17 | Stop reason: HOSPADM

## 2021-04-15 RX ORDER — POTASSIUM CHLORIDE 20 MEQ/1
40 TABLET, EXTENDED RELEASE ORAL ONCE
Status: COMPLETED | OUTPATIENT
Start: 2021-04-15 | End: 2021-04-15

## 2021-04-15 RX ORDER — ACETAMINOPHEN 325 MG/1
650 TABLET ORAL EVERY 6 HOURS PRN
Status: DISCONTINUED | OUTPATIENT
Start: 2021-04-15 | End: 2021-04-17 | Stop reason: HOSPADM

## 2021-04-15 RX ORDER — RISPERIDONE 2 MG/1
2 TABLET, FILM COATED ORAL NIGHTLY
COMMUNITY
Start: 2020-06-05

## 2021-04-15 RX ORDER — ATORVASTATIN CALCIUM 40 MG/1
40 TABLET, FILM COATED ORAL DAILY
COMMUNITY
Start: 2020-06-05

## 2021-04-15 RX ORDER — ATORVASTATIN CALCIUM 40 MG/1
40 TABLET, FILM COATED ORAL NIGHTLY
Status: DISCONTINUED | OUTPATIENT
Start: 2021-04-15 | End: 2021-04-17 | Stop reason: HOSPADM

## 2021-04-15 RX ORDER — SODIUM CHLORIDE, SODIUM LACTATE, POTASSIUM CHLORIDE, AND CALCIUM CHLORIDE .6; .31; .03; .02 G/100ML; G/100ML; G/100ML; G/100ML
1000 INJECTION, SOLUTION INTRAVENOUS ONCE
Status: COMPLETED | OUTPATIENT
Start: 2021-04-15 | End: 2021-04-15

## 2021-04-15 RX ORDER — FAMOTIDINE 20 MG/1
20 TABLET, FILM COATED ORAL DAILY
COMMUNITY

## 2021-04-15 RX ORDER — ASPIRIN 325 MG
325 TABLET ORAL DAILY
Status: ON HOLD | COMMUNITY
End: 2021-04-16

## 2021-04-15 RX ORDER — NICOTINE POLACRILEX 4 MG
15 LOZENGE BUCCAL PRN
Status: DISCONTINUED | OUTPATIENT
Start: 2021-04-15 | End: 2021-04-17 | Stop reason: HOSPADM

## 2021-04-15 RX ORDER — ONDANSETRON 2 MG/ML
4 INJECTION INTRAMUSCULAR; INTRAVENOUS EVERY 6 HOURS PRN
Status: DISCONTINUED | OUTPATIENT
Start: 2021-04-15 | End: 2021-04-17 | Stop reason: HOSPADM

## 2021-04-15 RX ORDER — SODIUM CHLORIDE 9 MG/ML
INJECTION, SOLUTION INTRAVENOUS CONTINUOUS
Status: DISCONTINUED | OUTPATIENT
Start: 2021-04-15 | End: 2021-04-16

## 2021-04-15 RX ORDER — CIPROFLOXACIN 250 MG/1
250 TABLET, FILM COATED ORAL 2 TIMES DAILY
Qty: 14 TABLET | Refills: 0 | Status: SHIPPED | OUTPATIENT
Start: 2021-04-15 | End: 2021-04-17 | Stop reason: HOSPADM

## 2021-04-15 RX ORDER — POLYETHYLENE GLYCOL 3350 17 G/17G
17 POWDER, FOR SOLUTION ORAL DAILY PRN
Status: DISCONTINUED | OUTPATIENT
Start: 2021-04-15 | End: 2021-04-17 | Stop reason: HOSPADM

## 2021-04-15 RX ORDER — FAMOTIDINE 20 MG/1
20 TABLET, FILM COATED ORAL DAILY
Status: ON HOLD | COMMUNITY
Start: 2020-06-05 | End: 2021-04-17 | Stop reason: HOSPADM

## 2021-04-15 RX ORDER — SODIUM CHLORIDE 9 MG/ML
INJECTION, SOLUTION INTRAVENOUS ONCE
Status: COMPLETED | OUTPATIENT
Start: 2021-04-15 | End: 2021-04-15

## 2021-04-15 RX ORDER — INSULIN LISPRO 100 [IU]/ML
0-6 INJECTION, SOLUTION INTRAVENOUS; SUBCUTANEOUS
Status: DISCONTINUED | OUTPATIENT
Start: 2021-04-15 | End: 2021-04-16

## 2021-04-15 RX ORDER — TORSEMIDE 20 MG/1
80 TABLET ORAL 2 TIMES DAILY
Status: ON HOLD | COMMUNITY
Start: 2020-06-05 | End: 2021-05-21 | Stop reason: HOSPADM

## 2021-04-15 RX ORDER — GLIPIZIDE 5 MG/1
5 TABLET ORAL
Status: ON HOLD | COMMUNITY
End: 2021-05-14 | Stop reason: CLARIF

## 2021-04-15 RX ORDER — INSULIN LISPRO 100 [IU]/ML
0-3 INJECTION, SOLUTION INTRAVENOUS; SUBCUTANEOUS NIGHTLY
Status: DISCONTINUED | OUTPATIENT
Start: 2021-04-15 | End: 2021-04-16

## 2021-04-15 RX ADMIN — SODIUM CHLORIDE, SODIUM LACTATE, POTASSIUM CHLORIDE, AND CALCIUM CHLORIDE 1000 ML: .6; .31; .03; .02 INJECTION, SOLUTION INTRAVENOUS at 06:04

## 2021-04-15 RX ADMIN — CEFTRIAXONE 1000 MG: 1 INJECTION, POWDER, FOR SOLUTION INTRAMUSCULAR; INTRAVENOUS at 10:29

## 2021-04-15 RX ADMIN — ENOXAPARIN SODIUM 40 MG: 40 INJECTION SUBCUTANEOUS at 18:17

## 2021-04-15 RX ADMIN — INSULIN LISPRO 4 UNITS: 100 INJECTION, SOLUTION INTRAVENOUS; SUBCUTANEOUS at 18:00

## 2021-04-15 RX ADMIN — INSULIN HUMAN 10 UNITS: 100 INJECTION, SOLUTION PARENTERAL at 06:46

## 2021-04-15 RX ADMIN — POTASSIUM CHLORIDE 40 MEQ: 1500 TABLET, EXTENDED RELEASE ORAL at 06:43

## 2021-04-15 RX ADMIN — SODIUM CHLORIDE: 9 INJECTION, SOLUTION INTRAVENOUS at 17:38

## 2021-04-15 RX ADMIN — INSULIN GLARGINE 17 UNITS: 100 INJECTION, SOLUTION SUBCUTANEOUS at 22:42

## 2021-04-15 RX ADMIN — INSULIN LISPRO 1 UNITS: 100 INJECTION, SOLUTION INTRAVENOUS; SUBCUTANEOUS at 22:43

## 2021-04-15 RX ADMIN — SODIUM CHLORIDE: 9 INJECTION, SOLUTION INTRAVENOUS at 17:39

## 2021-04-15 ASSESSMENT — ENCOUNTER SYMPTOMS
RESPIRATORY NEGATIVE: 1
EYES NEGATIVE: 1
GASTROINTESTINAL NEGATIVE: 1

## 2021-04-15 NOTE — H&P
Hospital Medicine History & Physical      PCP: Torsten Francisco    Date of Admission: 4/15/2021    Date of Service: Pt seen/examined on 4/15/2021 and Admitted to Inpatient with expected LOS greater than two midnights due to medical therapy. Chief Complaint:  fall      History Of Present Illness:  Patient is 57-year-old female with history of schizophrenia, diabetes mellitus type 2, DVT status post IVC filter, uses cane, dementia came into ER via EMS after she had a fall in kitchen. Patient denies any loss of consciousness. Due to dementia ROS and history is limited. I talked to patient's daughter Tula Favre said patient lives at home along with her disabled sister was on daily dialysis and cannot ambulate. They have a health aide comes 5 hours daily. Due to her dementia she has a electric dispenser of medication. On arrival blood pressure 144/70. Blood sugar 528. WBC 14.4.  UA suggestive of UTI.     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 ([address]     Other abnormal glucose 7/21/2011    PE (pulmonary embolism)     Personal history of schizophrenia 5/1/2008    Personal history of venous thrombosis and embolism 6/2/2008    S/P hysterectomy     Schizophrenia ([address]     Unspecified cerebral artery occlusion with cerebral infarction     Unspecified urinary incontinence 10/17/2012    Urinary retention     Venous thrombosis        Past Surgical History:          Procedure Laterality Date    HYSTERECTOMY  2011    [facility] for abnormal uterine bleeding    OTHER SURGICAL HISTORY  10/01/2013    EUS, EGD Medications Prior to Admission:      Prior to Admission medications    Medication Sig Start Date End Date Taking? Authorizing Provider   ciprofloxacin (CIPRO) 250 MG tablet Take 1 tablet by mouth 2 times daily for 14 doses 4/15/21 4/22/21 Yes Perfecto Weaver MD   torsemide (DEMADEX) 20 MG tablet Take 80 mg by mouth 2 times daily 6/5/20  Yes Historical Provider, MD   atorvastatin (LIPITOR) 40 MG tablet Take 40 mg by mouth daily   Yes Historical Provider, MD   acetaminophen (TYLENOL) 325 MG tablet Take 650 mg by mouth every 4 hours as needed for Pain   Yes Historical Provider, MD   famotidine (PEPCID) 20 MG tablet Take 20 mg by mouth daily   Yes Historical Provider, MD   glipiZIDE (GLUCOTROL) 5 MG tablet Take 5 mg by mouth 2 times daily (before meals)   Yes Historical Provider, MD   metoprolol tartrate (LOPRESSOR) 25 MG tablet Take 25 mg by mouth 2 times daily   Yes Historical Provider, MD   mineral oil-hydrophilic petrolatum (AQUAPHOR) ointment Apply topically 2 times daily Apply topically   Yes Historical Provider, MD   atorvastatin (LIPITOR) 40 MG tablet Take 40 mg by mouth daily 6/5/20  Yes Historical Provider, MD   famotidine (PEPCID) 20 MG tablet Take 20 mg by mouth daily 6/5/20  Yes Historical Provider, MD   risperiDONE (RISPERDAL) 2 MG tablet Take 2 mg by mouth nightly 6/5/20  Yes Historical Provider, MD   aspirin 325 MG EC tablet Take 325 mg by mouth daily   Yes Historical Provider, MD   aspirin 325 MG tablet Take 325 mg by mouth daily    Historical Provider, MD       Allergies:  Patient has no known allergies. Social History:      The patient currently lives home uses cane    TOBACCO:   reports that she has never smoked. She has never used smokeless tobacco.  ETOH:   reports no history of alcohol use. Family History:       Reviewed in detail and negative for DM, CAD, Cancer, CVA. Positive as follows:    History reviewed. No pertinent family history.     REVIEW OF SYSTEMS:   Pertinent positives as noted in the HPI. All other systems reviewed and negative. PHYSICAL EXAM PERFORMED:    /77   Pulse 113   Temp 99.9 °F (37.7 °C)   Resp 26   Ht 5' 6\" (1.676 m)   SpO2 92%   BMI 44.22 kg/m²     General appearance:  Elderly, ill looking  HEENT:  Normal cephalic, atraumatic without obvious deformity. Pupils equal, round, and reactive to light. Extra ocular muscles intact. Conjunctivae/corneas clear. Neck: Supple, with full range of motion. No jugular venous distention. Trachea midline. Respiratory:  Normal respiratory effort. Clear to auscultation, bilaterally without Rales/Wheezes/Rhonchi. Cardiovascular:  Regular rate and rhythm with normal S1/S2 without murmurs, rubs or gallops. Abdomen: Soft, non-tender, non-distended with normal bowel sounds. Musculoskeletal:  No clubbing, cyanosis or edema bilaterally. Skin: Skin color, texture, turgor normal.  No rashes or lesions. Neurologic: Alert oriented to self and place. CN II to XII intact, no focal neuro deficit noted. Psychiatric:  Alert and oriented, flat face. Capillary Refill: Brisk,< 3 seconds   Peripheral Pulses: +2 palpable, equal bilaterally       Labs:     Recent Labs     04/15/21  0508   WBC 14.4*   HGB 11.6*   HCT 34.9*        Recent Labs     04/15/21  0508   *   K 3.6   CL 94*   CO2 28   BUN 22*   CREATININE 1.2   CALCIUM 11.0*     No results for input(s): AST, ALT, BILIDIR, BILITOT, ALKPHOS in the last 72 hours. No results for input(s): INR in the last 72 hours.   Recent Labs     04/15/21  0508   TROPONINI <0.01       Urinalysis:      Lab Results   Component Value Date    NITRU Negative 04/15/2021    WBCUA 21-50 04/15/2021    BACTERIA 4+ 04/15/2021    RBCUA 3-4 04/15/2021    BLOODU LARGE 04/15/2021    SPECGRAV <=1.005 04/15/2021    GLUCOSEU 500 04/15/2021       Radiology:     CXR: I have reviewed the CXR with the following interpretation: see radio report  EKG:  I have reviewed the EKG with the following interpretation: sinus rhythm, poor quality EKG    XR CHEST PORTABLE   Final Result      No pneumothorax. Mildly increased prominence of a linear consolidation in the right midlung in comparison to chest x-ray of 9/29/2020. This was seen on the chest CT of 2/26/2020, with follow-up CT recommended. Again, nonemergent follow-up chest CT is recommended. Left lung is clear. Stable cardiomediastinal silhouette. XR KNEE RIGHT (1-2 VIEWS)   Final Result     No acute findings in the right knee. XR KNEE LEFT (1-2 VIEWS)   Final Result     No acute findings in the left knee. Progressive degenerative changes    involving the medial compartment. XR PELVIS (1-2 VIEWS)   Final Result     No acute findings in the pelvis. XR TIBIA FIBULA LEFT (2 VIEWS)   Final Result     No acute findings in the left tibia and fibula or surrounding soft    tissues. XR TIBIA FIBULA RIGHT (2 VIEWS)   Final Result   Soft tissue fullness noted at the ankle. Otherwise negative examination. XR ANKLE LEFT (MIN 3 VIEWS)   Final Result     Soft tissue fullness overlying the lateral malleolus. No acute osseous    traumatic injury or abnormal alignment. XR ANKLE RIGHT (MIN 3 VIEWS)   Final Result     Overlying soft tissue swelling noted both circumferentially about the    ankle and extending into the superior mid foot. No acute osseous    traumatic injury with stable chronic changes involving the medial    malleolus. CT HEAD WO CONTRAST   Final Result     No acute intracranial process or significant alteration from the    previous examination with underlying incidental chronic changes, as    detailed above, stable in appearance. CT CERVICAL SPINE WO CONTRAST   Final Result   1. No acute osseous traumatic injury or significant abnormal alignment    involving the cervical spine. 2.  2.1 x 1.7 cm right thyroid nodule suspected. Detailed evaluation    limited.   Recommend nonemergent diagnostic ultrasound, if not already    evaluated. ASSESSMENT:    Active Hospital Problems    Diagnosis Date Noted    UTI (urinary tract infection) [N39.0] 04/15/2021       Assessment and plan  #Sepsis present on admission. T-max 99.9, WBC 14. #UTI  History of incontinence. Will start on IV Rocephin. Follow-up on urine cultures. Received 1 L of LR bolus in ER. Will give saline 1 L bolus start IV fluid saline at 75 cc/h. #Diabetes mellitus type 2 with hyperglycemia. Hemoglobin A1c 7.4 2/2021. On glipizide twice daily at home. Will start on Lantus and insulin sliding scale. #Hypertension  Blood pressure is getting softer due to sepsis. Hold metoprolol, torsemide. #History of CVA  #History of DVT status post IVC filter    #Schizophrenia  Patient was prescribed Risperdal but she has not refilled it since November 2020. Would recommend follow-up as an outpatient    #Mild cognitive impairment. I did detailed discussion with patient's daughter at bedside that she would benefit from placement where she can get 24/7 supervision. DVT Prophylaxis: lovenox  Diet: DIET CARB CONTROL; Carb Control: 3 carb choices (45 gms)/meal  Code Status: Full Code    PT/OT Eval Status: consulted    Dispo - inpatient. 1-2 days. Pending clinical course. This chart was likely completed using voice recognition technology and may contain unintended grammatical , phraseology,and/or punctuation errors         Kaykay Barnes MD    Thank you Yi Leiva for the opportunity to be involved in this patient's care. If you have any questions or concerns please feel free to contact me at [phone].

## 2021-04-15 NOTE — CARE COORDINATION
Case Management Assessment           Initial Evaluation                Date / Time of Evaluation: 4/15/2021 5:12 PM                 Assessment Completed by: Seda Naranjo    Patient Name: Bear De Oliveira     YOB: 1940  Diagnosis: UTI (urinary tract infection) [N39.0]     Date / Time: 4/15/2021  3:00 AM    Patient Admission Status: Inpatient    If patient is discharged prior to next notation, then this note serves as note for discharge by case management. Current PCP: Sanjiv Bain Patient: No    Chart Reviewed: Yes  Patient/ Family Interviewed: Yes    Initial assessment completed at bedside with: daughter, Mert Manzo   Hospitalization in the last 30 days: No    Emergency Contacts:  Extended Emergency Contact Information  Primary Emergency Contact: [address] [facility] [address] Phone: [phone]  Relation: Child  Secondary Emergency Contact: [address] [facility] Phone: [phone]  Relation: Child    Advance Directives:   Code Status: [address]: Yes  Agent: Tate Kelly  Contact Number: [phone]    Copy present: No     In paper Chart: No    Scanned into EMR No    Financial  Payor: Jena [place] / Plan: Alyse Lucia PPO / Product Type: Medicare /     Pre-cert required for SNF: Yes    [address] [place] [address], [facility]  [address] [place] [address]  Phone: [phone] Fax: [phone]      Potential assistance Purchasing Medications: Potential Assistance Purchasing Medications: No  Does Patient want to participate in local refill/ meds to beds program?: No    Meds To Beds General Rules:  1. Can ONLY be done Monday- Friday between 8:30am-5pm  2. Prescription(s) must be in pharmacy by 3pm to be filled same day  3. Copy of patient's insurance/ prescription drug card and patient face sheet must be sent along with the prescription(s)  4. Cost of Rx cannot be added to hospital bill. If financial assistance is needed, please contact unit  or ;  or  CANNOT provide pharmacy voucher for patients co-pays  5. Patients can then  the prescription on their way out of the hospital at discharge, or pharmacy can deliver to the bedside if staff is available. (payment due at time of pick-up or delivery - cash, check, or card accepted)     Able to afford home medications/ co-pay costs: Yes    ADLS  Support Systems: Children, Family Members, Home Care Staff    PT AM-PAC:   /24  OT AM-PAC:   /24    [place]: lives with disabled daughter (disabled daughter has nightly PD treatments and has to go to clinic once a week. Plans to RETURN to current housing: Yes  Barriers to RETURNING to current housing:     Vanesa Jarrett 80  Currently ACTIVE with [facility] [address]: No  Home Care Agency: Not Applicable    Currently ACTIVE with [place] [facility]: Yes  Passport/ Waiver: Yes  Passport/ Waiver Services: [address] for 5 hours/ day for 5 days per week, [facility] [employer] [facility] and web care [employer] and 2 hours Saturday and Sunday for homemaking    : Phone: [phone]    Patient uses a cane      DISCHARGE PLAN:  Disposition: Home with [address]: TBD-possible for nurse check ups as well as home health aides already involved     Transportation PLAN for discharge: family     Factors facilitating achievement of predicted outcomes: Family support, Motivated and Cooperative    Barriers to discharge: Medical complications    Additional Case Management Notes: Patient lives with daughter Louise Baldwin) who is disabled. Patient has lately been more confused and has her days and nights mixed up. Patient was assessed about 2-3 years ago at the [address] [facility] and has dementia. Patient gets very confused with any infection.  Unable to relocate patient into a memory care unit. This was attempted another time and is not an option. Kervin Hernandez is going to call the non-skilled help and see if they can switch to evening or night care. Maria Luz Dominguez this CM's number for Friday contact. Otherwise, she can call the nursing station for any info and to contact CM. CM will continue to follow patient until discharge. Electronically signed by Rivka Minaya RN on 4/15/2021 at 5:36 PM        The Plan for Transition of Care is related to the following treatment goals of UTI (urinary tract infection) [N39.0]    The Patient and/or patient representative Kevyn Antunez and her family were provided with a choice of provider and agrees with the discharge plan Yes    Freedom of choice list was provided with basic dialogue that supports the patient's individualized plan of care/goals and shares the quality data associated with the providers.  Yes    Care Transition patient: No    Rivka Minaya RN  [facility] [employer]  Case Management Department  Ph: [phone]

## 2021-04-15 NOTE — CARE COORDINATION
at [facility] Insurance on Aging: [place], [place], [phone]. Patient has a HHA daily 4 hours a day, MOW, and [employer]. Patient usually lives at home with daughter. Patient is confused. Daughter who lives in [facility] is [place] and came into town today. She is currently not at bedside.     Electronically signed by DWAIN Patel LISW-S on 4/15/2021 at 1:11 PM

## 2021-04-15 NOTE — ED TRIAGE NOTES
Patient arrives via EMS who gives the report that she fell in her kitchen (-LOC) but was incontinent on the kitchen floor. When EMS checked her blood glucose, it read high which is over 400 on their glucometer. Patient denies any pain nor LOC, but is hard to get a history from, as she has dementia. Patient states that her blood sugar has been running high recently and that she has been drinking a lot of water. Patient states that she was drinking water in the kitchen when she fell. Patient notes that her daughter is the one who called EMS.

## 2021-04-15 NOTE — CONSULTS
Clinical Pharmacy Progress Note  Pharmacy to assist with Home Medication information    Admit date: 4/15/2021     Subjective/Objective:  Patient is a 74yr old female from home admitted with hyperglycemia. Pharmacy has been asked by Dr. Donald Berry to assist with obtaining home medication information. Assessment/Plan:    1. Home Medication information:    · Attempted to speak with patient. Unfortunately, she was unable to tell what pharmacy she fills her medications. She stated [facility]. \"   · Called the patient's PCP office, Dr Dari Merino. RN was able to fax a list of her current medications. See paper chart for list.    · Also called [employer] [facility] [phone]. Patient is current on the medications entered as active on her Home Med List; these meds also correlate with the PCP list.    · Please note :   - Risperdal 2mg QHS -- last filled in Nov - did not add to home med list   - Sitagliptan 100mg -- not filled since July 2020, so not added to home med list.      · Home Medication List in [address] [place] [address] Rd is now up to date. Changes made to medication list:   Medications removed (no longer taking):  · Lisinopril  · Coricidin caps  · Risperidone    Medications added:   · Aquaphor    Medication doses / instructions adjusted:   · Metoprolol  · Atorvastatin  · Glipizide  · Famotidine      A Perfect [employer] message has been sent to Dr. Donald Berry to discuss the updated [facility] List.      Thank you, please call with questions.    Danish Ortiz PharmD., BCPS   4/15/2021 1:56 PM  Wireless: [phone]

## 2021-04-15 NOTE — ED PROVIDER NOTES
[id number]          ATTENDING PHYSICIAN NOTE       Date of evaluation: 4/15/2021    Chief Complaint     Fall and Hyperglycemia      History of Present Illness     Eugenia Pate is a 68 y.o. female who presents to the emerge department for a fall. Patient was in her kitchen drinking water and fell. She does not remember what made her fall but denies loss of consciousness. EMS was called to help get the patient up but they also found out that her blood sugars have been very high over the last several days. They checked a fingerstick and it was critical high on her meter so the patient was brought to the emergency department for evaluation. Patient denies any fevers or chills. She denies any cough or shortness of breath. She denies any chest or abdominal pain. He denies any nausea, vomiting, or diarrhea. She denies any urinary symptoms. Review of Systems     Review of Systems   HENT: Negative. Eyes: Negative. Respiratory: Negative. Cardiovascular: Negative. Gastrointestinal: Negative. Endocrine: Positive for polydipsia. Genitourinary: Negative. Musculoskeletal: Negative. Neurological: Negative. All other systems reviewed and are negative.       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 Heart sounds: Normal heart sounds. Pulmonary:      Effort: Pulmonary effort is normal.      Breath sounds: Normal breath sounds. No wheezing, rhonchi or rales. Abdominal:      General: Bowel sounds are normal.      Palpations: Abdomen is soft. Tenderness: There is no abdominal tenderness. There is no guarding or rebound. Musculoskeletal: Normal range of motion. General: Swelling present. Right lower leg: Edema present. Left lower leg: Edema present. Comments: Tender to palpation to the bilateral lower extremities distal to the knee. Skin:     General: Skin is warm and dry. Neurological:      General: No focal deficit present. Mental Status: She is alert and oriented to person, place, and time. Cranial Nerves: No cranial nerve deficit. Motor: No weakness. Coordination: Coordination normal.         Diagnostic Results     EKG   EKG is interpreted by me shows patient to be in a normal sinus rhythm with a left axis deviation, normal AK and QT intervals, normal QRS duration, no ST segment abnormalities, no T wave abnormalities, though limited by motion artifact. RADIOLOGY:  XR KNEE RIGHT (1-2 VIEWS)   Final Result     No acute findings in the right knee. XR KNEE LEFT (1-2 VIEWS)   Final Result     No acute findings in the left knee. Progressive degenerative changes    involving the medial compartment. XR PELVIS (1-2 VIEWS)   Final Result     No acute findings in the pelvis. XR TIBIA FIBULA LEFT (2 VIEWS)   Final Result     No acute findings in the left tibia and fibula or surrounding soft    tissues. XR TIBIA FIBULA RIGHT (2 VIEWS)   Final Result   Soft tissue fullness noted at the ankle. Otherwise negative examination. XR ANKLE LEFT (MIN 3 VIEWS)   Final Result     Soft tissue fullness overlying the lateral malleolus. No acute osseous    traumatic injury or abnormal alignment.           XR ANKLE RIGHT (MIN 3 VIEWS)   Final Result     Overlying soft tissue swelling noted both circumferentially about the    ankle and extending into the superior mid foot. No acute osseous    traumatic injury with stable chronic changes involving the medial    malleolus. CT HEAD WO CONTRAST   Final Result     No acute intracranial process or significant alteration from the    previous examination with underlying incidental chronic changes, as    detailed above, stable in appearance. CT CERVICAL SPINE WO CONTRAST   Final Result   1. No acute osseous traumatic injury or significant abnormal alignment    involving the cervical spine. 2.  2.1 x 1.7 cm right thyroid nodule suspected. Detailed evaluation    limited. Recommend nonemergent diagnostic ultrasound, if not already    evaluated.           XR CHEST PORTABLE    (Results Pending)       LABS:   Results for orders placed or performed during the hospital encounter of 04/15/21   CBC auto differential   Result Value Ref Range    WBC 14.4 (H) 4.0 - 11.0 K/uL    RBC 4.19 4.00 - 5.20 M/uL    Hemoglobin 11.6 (L) 12.0 - 16.0 g/dL    Hematocrit 34.9 (L) 36.0 - 48.0 %    MCV 83.2 80.0 - 100.0 fL    MCH 27.8 26.0 - 34.0 pg    MCHC 33.4 31.0 - 36.0 g/dL    RDW 15.8 (H) 12.4 - 15.4 %    Platelets 663 994 - 974 K/uL    MPV 9.4 5.0 - 10.5 fL    PLATELET SLIDE REVIEW Adequate     Neutrophils % 81.4 %    Lymphocytes % 9.0 %    Monocytes % 8.2 %    Eosinophils % 0.2 %    Basophils % 1.2 %    Neutrophils Absolute 11.7 (H) 1.7 - 7.7 K/uL    Lymphocytes Absolute 1.3 1.0 - 5.1 K/uL    Monocytes Absolute 1.2 0.0 - 1.3 K/uL    Eosinophils Absolute 0.0 0.0 - 0.6 K/uL    Basophils Absolute 0.2 0.0 - 0.2 K/uL   Basic Metabolic Panel (EP - 1)   Result Value Ref Range    Sodium 134 (L) 136 - 145 mmol/L    Potassium 3.6 3.5 - 5.1 mmol/L    Chloride 94 (L) 99 - 110 mmol/L    CO2 28 21 - 32 mmol/L    Anion Gap 12 3 - 16    Glucose 528 (H) 70 - 99 mg/dL    BUN 22 (H) 7 - 20 mg/dL CREATININE 1.2 0.6 - 1.2 mg/dL    GFR Non- 43 (A) >60    GFR  52 (A) >60    Calcium 11.0 (H) 8.3 - 10.6 mg/dL   Blood Gas, Venous   Result Value Ref Range    pH, [place] 7.411 7.350 - 7.450    pCO2, [place] 52.0 (H) 41.0 - 51.0 mmHg    pO2, [place] 122.0 (H) 25 - 40 mmHg    HCO3, Venous 33.0 (H) 24.0 - 28.0 mmol/L    Base Excess, [place] 7.1 (H) -2.0 - 3.0 mmol/L    O2 Sat, [place] 98 Not established %    Carboxyhemoglobin 0.8 0.0 - 1.5 %    MetHgb, [place] 0.8 0.0 - 1.5 %    TC02 (Calc), [place] 35 mmol/L   Urinalysis, reflex to microscopic (Lab)   Result Value Ref Range    Color, UA Yellow Straw/Yellow    Clarity, UA SL CLOUDY (A) Clear    Glucose, Ur 500 (A) Negative mg/dL    Bilirubin Urine Negative Negative    Ketones, Urine Negative Negative mg/dL    Specific Gravity, UA <=1.005 1.005 - 1.030    Blood, Urine LARGE (A) Negative    pH, UA 6.0 5.0 - 8.0    Protein, UA Negative Negative mg/dL    Urobilinogen, Urine 0.2 <2.0 E.U./dL    Nitrite, Urine Negative Negative    Leukocyte Esterase, Urine SMALL (A) Negative    Microscopic Examination YES     Urine Type NotGiven    POCT Glucose   Result Value Ref Range    POC Glucose 481 (H) 70 - 99 mg/dl    Performed on ACCU-CHEK      RECENT VITALS:  BP: (!) 145/68,Temp: 98.8 °F (37.1 °C), Pulse: 87, Resp: 30, SpO2: 94 %     Procedures     N/A    ED Course     Nursing Notes, Past Medical Hx, Past Surgical Hx, Social Hx,Allergies, and Family Hx were reviewed. patient was given the following medications:  Orders Placed This Encounter   Medications    lactated ringers bolus    insulin regular (HUMULIN R;NOVOLIN R) injection 10 Units    potassium chloride (KLOR-CON M) extended release tablet 40 mEq       CONSULTS:  None    MEDICAL DECISIONMAKING / ASSESSMENT / Mary Kate Chaudhary is a 68 y.o. female presents to the emergency department after falling in her kitchen. Patient denies any loss of consciousness associated with this.   She states that her blood sugar has been elevated and she was getting a drink of water when this happened. On arrival, the patient has stable vital signs. She does complain of tenderness to palpation of the bilateral lower extremities but imaging of this is unremarkable. Patient is hyperglycemic with blood glucose of 528. She has no anion gap, normal VBG, and normal bicarb so I have low suspicion for DKA. Patient was written for a liter of lactated Ringer's and 10 units of regular insulin. Urinalysis and chest x-ray are pending. At this time, care of the patient will be turned over to the oncoming provider who will complete the patient's work-up and disposition pending results of troponin, urinalysis, and chest x-ray as well as repeat fingerstick blood glucose after the above therapies. Clinical Impression     No diagnosis found. Disposition     PATIENT REFERRED TO:  No follow-up provider specified.     DISCHARGE MEDICATIONS:  New Prescriptions    No medications on file       [id number] Ashley Butcher MD  04/15/21 3946

## 2021-04-16 LAB
A/G RATIO: 0.9 (ref 1.1–2.2)
ALBUMIN SERPL-MCNC: 3.6 G/DL (ref 3.4–5)
ALP BLD-CCNC: 120 U/L (ref 40–129)
ALT SERPL-CCNC: 18 U/L (ref 10–40)
ANION GAP SERPL CALCULATED.3IONS-SCNC: 9 MMOL/L (ref 3–16)
AST SERPL-CCNC: 19 U/L (ref 15–37)
BASOPHILS ABSOLUTE: 0.1 K/UL (ref 0–0.2)
BASOPHILS RELATIVE PERCENT: 0.6 %
BILIRUB SERPL-MCNC: 0.6 MG/DL (ref 0–1)
BUN BLDV-MCNC: 14 MG/DL (ref 7–20)
CALCIUM SERPL-MCNC: 10.6 MG/DL (ref 8.3–10.6)
CHLORIDE BLD-SCNC: 105 MMOL/L (ref 99–110)
CO2: 27 MMOL/L (ref 21–32)
CREAT SERPL-MCNC: 1 MG/DL (ref 0.6–1.2)
EOSINOPHILS ABSOLUTE: 0.1 K/UL (ref 0–0.6)
EOSINOPHILS RELATIVE PERCENT: 1.3 %
ESTIMATED AVERAGE GLUCOSE: 246 MG/DL
GFR AFRICAN AMERICAN: >60
GFR NON-AFRICAN AMERICAN: 54
GLOBULIN: 3.9 G/DL
GLUCOSE BLD-MCNC: 274 MG/DL (ref 70–99)
GLUCOSE BLD-MCNC: 322 MG/DL (ref 70–99)
GLUCOSE BLD-MCNC: 327 MG/DL (ref 70–99)
GLUCOSE BLD-MCNC: 328 MG/DL (ref 70–99)
GLUCOSE BLD-MCNC: 433 MG/DL (ref 70–99)
HBA1C MFR BLD: 10.2 %
HCT VFR BLD CALC: 33.2 % (ref 36–48)
HEMOGLOBIN: 11 G/DL (ref 12–16)
LYMPHOCYTES ABSOLUTE: 1.5 K/UL (ref 1–5.1)
LYMPHOCYTES RELATIVE PERCENT: 15.7 %
MCH RBC QN AUTO: 27.5 PG (ref 26–34)
MCHC RBC AUTO-ENTMCNC: 33 G/DL (ref 31–36)
MCV RBC AUTO: 83.4 FL (ref 80–100)
MONOCYTES ABSOLUTE: 1 K/UL (ref 0–1.3)
MONOCYTES RELATIVE PERCENT: 10.5 %
NEUTROPHILS ABSOLUTE: 6.7 K/UL (ref 1.7–7.7)
NEUTROPHILS RELATIVE PERCENT: 71.9 %
PDW BLD-RTO: 15.7 % (ref 12.4–15.4)
PERFORMED ON: ABNORMAL
PLATELET # BLD: 255 K/UL (ref 135–450)
PMV BLD AUTO: 9 FL (ref 5–10.5)
POTASSIUM REFLEX MAGNESIUM: 4 MMOL/L (ref 3.5–5.1)
RBC # BLD: 3.98 M/UL (ref 4–5.2)
SODIUM BLD-SCNC: 141 MMOL/L (ref 136–145)
TOTAL PROTEIN: 7.5 G/DL (ref 6.4–8.2)
WBC # BLD: 9.4 K/UL (ref 4–11)

## 2021-04-16 PROCEDURE — 97535 SELF CARE MNGMENT TRAINING: CPT

## 2021-04-16 PROCEDURE — 6370000000 HC RX 637 (ALT 250 FOR IP): Performed by: INTERNAL MEDICINE

## 2021-04-16 PROCEDURE — 97162 PT EVAL MOD COMPLEX 30 MIN: CPT

## 2021-04-16 PROCEDURE — 97530 THERAPEUTIC ACTIVITIES: CPT

## 2021-04-16 PROCEDURE — 80053 COMPREHEN METABOLIC PANEL: CPT

## 2021-04-16 PROCEDURE — 2580000003 HC RX 258: Performed by: INTERNAL MEDICINE

## 2021-04-16 PROCEDURE — 85025 COMPLETE CBC W/AUTO DIFF WBC: CPT

## 2021-04-16 PROCEDURE — 36415 COLL VENOUS BLD VENIPUNCTURE: CPT

## 2021-04-16 PROCEDURE — 97166 OT EVAL MOD COMPLEX 45 MIN: CPT

## 2021-04-16 PROCEDURE — 97116 GAIT TRAINING THERAPY: CPT

## 2021-04-16 PROCEDURE — 6360000002 HC RX W HCPCS: Performed by: INTERNAL MEDICINE

## 2021-04-16 PROCEDURE — 1200000000 HC SEMI PRIVATE

## 2021-04-16 RX ORDER — TORSEMIDE 20 MG/1
20 TABLET ORAL 2 TIMES DAILY
Status: DISCONTINUED | OUTPATIENT
Start: 2021-04-16 | End: 2021-04-17 | Stop reason: HOSPADM

## 2021-04-16 RX ORDER — INSULIN LISPRO 100 [IU]/ML
0-12 INJECTION, SOLUTION INTRAVENOUS; SUBCUTANEOUS
Status: DISCONTINUED | OUTPATIENT
Start: 2021-04-17 | End: 2021-04-17 | Stop reason: HOSPADM

## 2021-04-16 RX ORDER — INSULIN LISPRO 100 [IU]/ML
0-6 INJECTION, SOLUTION INTRAVENOUS; SUBCUTANEOUS NIGHTLY
Status: DISCONTINUED | OUTPATIENT
Start: 2021-04-16 | End: 2021-04-17 | Stop reason: HOSPADM

## 2021-04-16 RX ORDER — ATORVASTATIN CALCIUM 40 MG/1
40 TABLET, FILM COATED ORAL DAILY
Status: DISCONTINUED | OUTPATIENT
Start: 2021-04-16 | End: 2021-04-17 | Stop reason: HOSPADM

## 2021-04-16 RX ORDER — TORSEMIDE 20 MG/1
80 TABLET ORAL 2 TIMES DAILY
Status: DISCONTINUED | OUTPATIENT
Start: 2021-04-16 | End: 2021-04-16

## 2021-04-16 RX ADMIN — INSULIN LISPRO 5 UNITS: 100 INJECTION, SOLUTION INTRAVENOUS; SUBCUTANEOUS at 14:39

## 2021-04-16 RX ADMIN — INSULIN GLARGINE 10 UNITS: 100 INJECTION, SOLUTION SUBCUTANEOUS at 22:19

## 2021-04-16 RX ADMIN — ASPIRIN 325 MG: 325 TABLET, COATED ORAL at 14:37

## 2021-04-16 RX ADMIN — ATORVASTATIN CALCIUM 40 MG: 40 TABLET, FILM COATED ORAL at 22:14

## 2021-04-16 RX ADMIN — INSULIN LISPRO 3 UNITS: 100 INJECTION, SOLUTION INTRAVENOUS; SUBCUTANEOUS at 10:12

## 2021-04-16 RX ADMIN — METOPROLOL TARTRATE 25 MG: 25 TABLET, FILM COATED ORAL at 22:14

## 2021-04-16 RX ADMIN — INSULIN LISPRO 3 UNITS: 100 INJECTION, SOLUTION INTRAVENOUS; SUBCUTANEOUS at 22:17

## 2021-04-16 RX ADMIN — TORSEMIDE 20 MG: 20 TABLET ORAL at 14:37

## 2021-04-16 RX ADMIN — METOPROLOL TARTRATE 25 MG: 25 TABLET, FILM COATED ORAL at 14:38

## 2021-04-16 RX ADMIN — ATORVASTATIN CALCIUM 40 MG: 40 TABLET, FILM COATED ORAL at 14:37

## 2021-04-16 RX ADMIN — TORSEMIDE 20 MG: 20 TABLET ORAL at 22:14

## 2021-04-16 RX ADMIN — SODIUM CHLORIDE: 9 INJECTION, SOLUTION INTRAVENOUS at 10:01

## 2021-04-16 RX ADMIN — Medication 10 ML: at 22:14

## 2021-04-16 RX ADMIN — INSULIN LISPRO 5 UNITS: 100 INJECTION, SOLUTION INTRAVENOUS; SUBCUTANEOUS at 17:38

## 2021-04-16 RX ADMIN — INSULIN GLARGINE 17 UNITS: 100 INJECTION, SOLUTION SUBCUTANEOUS at 22:17

## 2021-04-16 RX ADMIN — CEFTRIAXONE 1000 MG: 1 INJECTION, POWDER, FOR SOLUTION INTRAMUSCULAR; INTRAVENOUS at 10:02

## 2021-04-16 ASSESSMENT — PAIN SCALES - GENERAL
PAINLEVEL_OUTOF10: 0

## 2021-04-16 NOTE — PLAN OF CARE
injury  Outcome: Completed     Problem: Safety:  Goal: Free from intentional harm  Description: Free from intentional harm  Outcome: Completed     Problem: Pain:  Goal: Patient's pain/discomfort is manageable  Description: Patient's pain/discomfort is manageable  Outcome: Completed     Problem: Skin Integrity:  Goal: Skin integrity will stabilize  Description: Skin integrity will stabilize  Outcome: Completed     Problem: Discharge Planning:  Goal: Patients continuum of care needs are met  Description: Patients continuum of care needs are met  Outcome: Completed

## 2021-04-16 NOTE — PROGRESS NOTES
Physician Progress Note      PATIENTPershishlomo Garcia  [facility] #:                  [id number]  :                       1940  ADMIT DATE:       4/15/2021 3:00 AM  [address] [place] DATE:  RESPONDING  PROVIDER #:        Gilmar Machuca MD          QUERY TEXT:    Patient admitted with BMI 40.81. If possible, please document in progress   notes and discharge summary if you are evaluating and /or treating any of the   following: The medical record reflects the following:  Risk Factors: PMH Obesity  Clinical Indicators: BMI > 40  Treatment: I&Os, wts  Options provided:  -- Obesity  -- Morbid obesity  -- Overweight  -- BMI not clinically significant  -- Other - I will add my own diagnosis  -- Disagree - Not applicable / Not valid  -- Disagree - Clinically unable to determine / Unknown  -- Refer to Clinical Documentation Reviewer    PROVIDER RESPONSE TEXT:    This patient has obesity w/BMI 40.81 kg/m2.     Query created by: Gutierrez Siu on 2021 2:11 PM      Electronically signed by:  Gilmar Machuca MD 2021 3:32 PM

## 2021-04-16 NOTE — PLAN OF CARE
Problem: Falls - Risk of:  Goal: Will remain free from falls  Description: Will remain free from falls  Outcome: Met This Shift  Pt is a Fall Risk. See Linda Berry Fall Risk Score. Pt bed in low position and side rails up. Call light and belongings in reach. Pt encouraged to call for assistance. Will continue with hourly rounds for PO intake, pain needs, toileting, and repositioning as needed. Problem: Skin Integrity:  Goal: Will show no infection signs and symptoms  Description: Will show no infection signs and symptoms  Outcome: Met This Shift     Problem: Confusion - Acute:  Goal: Absence of continued neurological deterioration signs and symptoms  Description: Absence of continued neurological deterioration signs and symptoms  Outcome: Met This Shift  Pt has become more verbal this shift and interactive with staff. Pt's orientation waxes and wanes, but she was reoriented this shift through conversation.

## 2021-04-16 NOTE — CARE COORDINATION
Patient's  at [address] [employer] is [employer] -[phone]. This CM called [facility] to ask for increased n. Asst. Hours, especially at night due to increased confusion and wandering. [facility] intake is reaching out to the [facility]  for increased hours. Informed [facility] that pt. Will be discharging Saturday back to home. CM will continue to follow patient until discharge. Electronically signed by Maki Patrick RN on 4/16/2021 at 10:01 AM     Addendum:  Call from [employer],  from [employer]. Sheryle Mark states that she will speak to Ermias Byers (patient's daughter) to find a way for more services at night. Sheryle Mark is approaching the [employer] program to see if it can help. At this time, patient is in [employer] program through [employer]. [employer] can help with not only non-skilled, but skilled services in the home. At this time, as a gap measure until [facility] can see if they can provide services, Jonelle Carter CM is setting patient up with home care for SW, SN, OT and PT in home. Patient already gets 5 hrs/day-M-F and 2 hrs/day Sat and Sun from [facility] from [facility] program from [employer]. The problem is that patient is wandering at night and her child that lives with her Jaqueline Mejia) is disabled and is hooked up to Peritoneal Dialysis throughout the night. [address] has been contacted to see if they can take insurance. CM will continue to follow patient until discharge. Electronically signed by Maki Patrick RN on 4/16/2021 at 12:50 PM     Addendum:  [facility] can accept patient's insurance. Called Christopher and let her know updates about [address] and [facility] trying to ramp up servcies. This CM is leaving note to weekend CM to call Ermias Modesta when receives discharge order so Ermias Byers can come to [facility] patient home. (Note): Asked Christopher if Minerva Fernandez (her sister-daughter of patient, who lives with patient) could change her PD to afternoons or when patient has caregivers in home.  Minerva Fernandez cannot because she is going to routine day appointments for hyperbaric treatments in order to save her foot. CM will continue to follow patient until discharge. Electronically signed by Rivka Minaya RN on 4/16/2021 at 2:56 PM     Addendum:  Nu Weiss and faxed home health care orders to [facility]. Daughter, Kervin Hernandez will transport home.  Electronically signed by Rivka Minaya RN on 4/16/2021 at 5:39 PM

## 2021-04-16 NOTE — PROGRESS NOTES
Patient alert and oriented times 2. Patient vss this shift. Patient on Telemetry NSR. Patient iv is in right forearm. Patient currently awaiting PT ot evaluation. Patient using bathroom with a pure wicc. patient complains of has no complaints. Patient adbominal sounds active. Patient skin had redness to feet but is clean dry and intact. Patient breath sounds clear but diminished. Patient bed locked in lowest position with bed alarm on. Call light bedside table and belongings in reach. Patient encouraged to call with any and all needs. Patient verbalizes understanding and call appropriately. Will continue to monitor.

## 2021-04-16 NOTE — PROGRESS NOTES
Hospitalist Progress Note      PCP: Vannessa Kramer    Date of Admission: 4/15/2021    Chief Complaint: [facility] Course: Patient is 24-year-old female with history of schizophrenia, diabetes mellitus type 2, DVT status post IVC filter, uses cane, dementia came into ER via EMS after she had a fall in kitchen. Patient denies any loss of consciousness. Due to dementia ROS and history is limited. I talked to patient's daughter Jj Trinh said patient lives at home along with her disabled sister was on daily dialysis and cannot ambulate. They have a health aide comes 5 hours daily. Due to her dementia she has a electric dispenser of medication.     On arrival blood pressure 144/70. Blood sugar 528. WBC 14.4.  UA suggestive of UTI. Subjective: Patient is more alert today. Sitting up in chair. She knows she is in the hospital.  She is not oriented to day or date. She cannot recall why she came into the hospital.  Denies fever, chills. No acute event reported overnight.       Medications:  Reviewed    Infusion Medications    sodium chloride      dextrose       Scheduled Medications    aspirin  325 mg Oral Daily    atorvastatin  40 mg Oral Daily    metoprolol tartrate  25 mg Oral BID    torsemide  20 mg Oral BID    sodium chloride flush  5-40 mL Intravenous 2 times per day    enoxaparin  40 mg Subcutaneous Daily    atorvastatin  40 mg Oral Nightly    insulin glargine  0.15 Units/kg Subcutaneous Nightly    insulin lispro  0-6 Units Subcutaneous TID WC    insulin lispro  0-3 Units Subcutaneous Nightly    cefTRIAXone (ROCEPHIN) IV  1,000 mg Intravenous Q24H     PRN Meds: sodium chloride flush, sodium chloride, promethazine **OR** ondansetron, polyethylene glycol, acetaminophen **OR** acetaminophen, glucose, dextrose, glucagon (rDNA), dextrose      Intake/Output Summary (Last 24 hours) at 4/16/2021 1231  Last data filed at 4/16/2021 0952  Gross per 24 hour   Intake 2274.17 ml   Output 1200 ml   Net 1074.17 ml       Physical Exam Performed:    /71   Pulse 88   Temp 98.7 °F (37.1 °C) (Oral)   Resp 18   Ht 5' 6\" (1.676 m)   Wt 252 lb 13.9 oz (114.7 kg)   SpO2 98%   BMI 40.81 kg/m²     General appearance: NAD, sitting up on chair. HEENT: Pupils equal, round, and reactive to light. Conjunctivae/corneas clear. Neck: Supple, with full range of motion. No jugular venous distention. Trachea midline. Respiratory:  Normal respiratory effort. Clear to auscultation, bilaterally without Rales/Wheezes/Rhonchi. Cardiovascular: Regular rate and rhythm with normal S1/S2 without murmurs, rubs or gallops. Abdomen: Soft, non-tender, non-distended with normal bowel sounds. Musculoskeletal: 1+ bilateral lower extremity edema. Skin: Skin color, texture, turgor normal.  No rashes or lesions. Neurologic: Alert oriented x2, no focal neuro deficit noted. Psychiatric: Alert and oriented,   Capillary Refill: Brisk,< 3 seconds   Peripheral Pulses: +2 palpable, equal bilaterally       Labs:   Recent Labs     04/15/21  0508 04/16/21  0627   WBC 14.4* 9.4   HGB 11.6* 11.0*   HCT 34.9* 33.2*    255     Recent Labs     04/15/21  0508 04/16/21  0627   * 141   K 3.6 4.0   CL 94* 105   CO2 28 27   BUN 22* 14   CREATININE 1.2 1.0   CALCIUM 11.0* 10.6     Recent Labs     04/16/21 0627   AST 19   ALT 18   BILITOT 0.6   ALKPHOS 120     No results for input(s): INR in the last 72 hours. Recent Labs     04/15/21  0508   TROPONINI <0.01       Urinalysis:      Lab Results   Component Value Date    NITRU Negative 04/15/2021    WBCUA 21-50 04/15/2021    BACTERIA 4+ 04/15/2021    RBCUA 3-4 04/15/2021    BLOODU LARGE 04/15/2021    SPECGRAV <=1.005 04/15/2021    GLUCOSEU 500 04/15/2021       Radiology:  XR CHEST PORTABLE   Final Result      No pneumothorax. Mildly increased prominence of a linear consolidation in the right midlung in comparison to chest x-ray of 9/29/2020.  This was seen on the chest CT of 2/26/2020, with follow-up CT recommended. Again, nonemergent follow-up chest CT is recommended. Left lung is clear. Stable cardiomediastinal silhouette. XR KNEE RIGHT (1-2 VIEWS)   Final Result     No acute findings in the right knee. XR KNEE LEFT (1-2 VIEWS)   Final Result     No acute findings in the left knee. Progressive degenerative changes    involving the medial compartment. XR PELVIS (1-2 VIEWS)   Final Result     No acute findings in the pelvis. XR TIBIA FIBULA LEFT (2 VIEWS)   Final Result     No acute findings in the left tibia and fibula or surrounding soft    tissues. XR TIBIA FIBULA RIGHT (2 VIEWS)   Final Result   Soft tissue fullness noted at the ankle. Otherwise negative examination. XR ANKLE LEFT (MIN 3 VIEWS)   Final Result     Soft tissue fullness overlying the lateral malleolus. No acute osseous    traumatic injury or abnormal alignment. XR ANKLE RIGHT (MIN 3 VIEWS)   Final Result     Overlying soft tissue swelling noted both circumferentially about the    ankle and extending into the superior mid foot. No acute osseous    traumatic injury with stable chronic changes involving the medial    malleolus. CT HEAD WO CONTRAST   Final Result     No acute intracranial process or significant alteration from the    previous examination with underlying incidental chronic changes, as    detailed above, stable in appearance. CT CERVICAL SPINE WO CONTRAST   Final Result   1. No acute osseous traumatic injury or significant abnormal alignment    involving the cervical spine. 2.  2.1 x 1.7 cm right thyroid nodule suspected. Detailed evaluation    limited. Recommend nonemergent diagnostic ultrasound, if not already    evaluated. Assessment/Plan:    Active Hospital Problems    Diagnosis Date Noted    UTI (urinary tract infection) [N39.0] 04/15/2021     #2 UTI  Continue Rocephin. Follow-up on urine culture.   Stop IV fluid    #Diabetes mellitus type 2 with hyperglycemia  Hemoglobin A1c 7.4 2/2021. On glipizide twice daily at home. Cont on Lantus and insulin sliding scale.     #Hypertension  Resume metoprolol and torsemide 20 mg twice daily. .     #History of CVA  #History of DVT status post IVC filter     #Schizophrenia  Patient was prescribed Risperdal but she has not refilled it since November 2020. Would recommend follow-up as an outpatient    DVT Prophylaxis: lovenox  Diet: DIET CARB CONTROL; Carb Control: 3 carb choices (45 gms)/meal  Code Status: Full Code    PT/OT Eval Status: in progress    Dispo - inpatient. Likely d/c in 24 hours.     This chart was likely completed using voice recognition technology and may contain unintended grammatical , phraseology,and/or punctuation errors      Galen Decker MD

## 2021-04-16 NOTE — PROGRESS NOTES
Physical Therapy    Facility/Department: [address]  Initial Assessment / treatment    NAME: Lizbeth Caceres  : 1940  MRN: [id number]    Date of Service: 2021    Discharge Recommendations: Lizbeth Caceres scored a 17/24 on the AM-PAC short mobility form. Current research shows that an AM-PAC score of 18 or greater is typically associated with a discharge to the patient's home setting. Based on the patient's AM-PAC score and their current functional mobility deficits, it is recommended that the patient have 2-3 sessions per week of Physical Therapy at d/c to increase the patient's independence. At this time, this patient demonstrates the endurance and safety to discharge home with home services and a follow up treatment frequency of 2-3x/wk. Please see assessment section for further patient specific details. If patient discharges prior to next session this note will serve as a discharge summary. Please see below for the latest assessment towards goals. PT Equipment Recommendations  Equipment Needed: Yes  Mobility Devices: Pavel Blake: Rolling  Other: if pt does not already have RW at home    Assessment   Body structures, Functions, Activity limitations: Decreased functional mobility   Assessment: Pt is 68 y.o. female admit with fall and hyperglycemia. Pt demos good improvement throughout session requiring less assist as therapy session progressed. Required use of walker for safe amb. Unclear if pt has one at home. Pt with h/o dementia and is likely near her functional baseline. Would benefit from increased assist initially (ideally 24hr) and home PT. Rec RW. Will follow.   Treatment Diagnosis: impaired gait and transfers  Prognosis: Good  Decision Making: Medium Complexity  PT Education: PT Role;Functional Mobility Training  Patient Education: pt demonstrates partial understanding; needs reinforcement  REQUIRES PT FOLLOW UP: Yes       Patient Diagnosis(es): The primary encounter diagnosis was Fall, initial encounter. A diagnosis of Acute cystitis without hematuria was also pertinent to this visit. 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 has a past surgical history that includes Hysterectomy (2011) and other surgical history (10/01/2013). Restrictions  Position Activity Restriction  Other position/activity restrictions: up as tolerated  Vision/Hearing  Vision: Impaired  Vision Exceptions: Visual field cut(R; noted during functional mobility, baseline from previous CVA?)  Hearing: Exceptions to [facility]  Hearing Exceptions: Hard of hearing/hearing concerns     Subjective  General  Chart Reviewed: Yes  Additional Pertinent Hx: Admit 4/15 s/p fall, (+) hyperglycemic; C-spine CT and head CT (-); XRs neg for acute osseous injury (B ankles, knees, pelvis); PMHx: CVA 09/2013, hemiparesis, HTN, HLD, PE, breast neoplasm, schizophrenia, DVTs, dementia  Referring Practitioner: Dave Knox MD  Diagnosis: UTI  Subjective  Subjective: Pt found supine in bed. Agreeable to PT. Gives brief, delayed responses.   Pain Screening  Patient Currently in Pain: Denies       Orientation  Orientation  Overall Orientation Status: Impaired(states last name, responds to first name, does not state birthdate or place)  Social/Functional History  Social/Functional History  Lives With: Family(daughter (non-ambulatory, on PD); sister)  Type of Home: House  ADL Assistance: Needs assistance(HHA assist with sponge baths, dressing; pt toilets self)  Homemaking Assistance: (sister and HHA do all housekeeping and cooking)  Ambulation Assistance: Independent(reports HHA assists when there, otherwise walks independently with cane)  Additional Comments: HHA 5 hr/day; pt has difficulty providing answers to home setup questions  Cognition        Objective          AROM RLE (degrees)  RLE AROM: WFL  AROM LLE (degrees)  LLE AROM : WFL  Strength RLE  Comment: grossly weak overall, at least 3+/5 throughout  Strength LLE  Comment: grossly weak overall, at least 3+/5 throughout        Bed mobility  Supine to Sit: Contact guard assistance(min cues, HOB partially elevated)  Transfers  Sit to Stand: Dependent/Total(mod A x 2 from raised EOB on 2nd trial; min A for sit to stand from chair; CGA from toilet)  Stand to sit: Minimal Assistance  Bed to Chair: Minimal assistance(stand step transfer from bed to chair)  Ambulation  Ambulation?: Yes  Ambulation 1  Device: Standard Walker  Assistance: Minimal assistance  Quality of Gait: decreased tonie, min cues for safety with walker, unsteady but no overt LOB  Distance: 20 ft; 30 ft     Balance  Sitting - Static: Good  Sitting - Dynamic: Good  Standing - Static: Fair  Standing - Dynamic: Fair      Treatment included gait and transfer training, pt education.   Plan   Plan  Times per week: 2-5  Current Treatment Recommendations: Strengthening, Balance Training, Functional Mobility Training, Transfer Training, Gait Training, Patient/Caregiver Education & Training, Equipment Evaluation, Education, & procurement  Safety Devices  Type of devices: Chair alarm in place, Nurse notified, Call light within reach, Left in chair, Gait belt    G-Code       OutComes Score                                                  AM-PAC Score  AM-PAC Inpatient Mobility Raw Score : 17 (04/16/21 1100)  AM-PAC Inpatient T-Scale Score : 42.13 (04/16/21 1100)  Mobility Inpatient CMS 0-100% Score: 50.57 (04/16/21 1100)  Mobility Inpatient CMS G-Code Modifier : CK (04/16/21 1100)          Goals  Short term goals  Time Frame for Short term goals: discharge  Short term goal 1: Pt will transfer supine <--> sit with supervision  Short term goal 2: Pt will transfer sit <--> stand with supervision  Short term goal 3: Pt will amb 50 ft with walker and SBA  Patient Goals   Patient goals : return home       Therapy Time   Individual Concurrent Group Co-treatment   Time In 0822         Time Out 0910         Minutes 48                 Timed Code Treatment Minutes:  33    Total Treatment Minutes:  48    If patient is discharged prior to next treatment, this note will serve as the discharge summary.   Brenda Carty, PT, DPT  [id number]

## 2021-04-16 NOTE — PROGRESS NOTES
Occupational Therapy   Occupational Therapy Initial Assessment/Tx Note  Date: 2021   Patient Name: Niecy Romo  MRN: [id number]     : 1940    Date of Service: 2021     Assessment: Pt has baseline dementia + acute AMS. She requires assist with some ADLs at home, but can typically ambulate independently with a cane. During eval, pt eventually improved to CGA/min assist with walker for functional mobility which may be relatively near her baseline. She requires consistent cueing during all functional activity due to cognition. Given dementia, pt may benefit most from returning home to her familiar environment with 24 hr spvn/A and home OT/PT. If pt is unable to have 24 hr A, short term inpt rehab may be necessary. Discharge Recommendations: Niecy Romo scored a 17/ on the AM-PAC ADL Inpatient form. Current research shows that an AM-PAC score of 18 or greater is typically associated with a discharge to the patient's home setting. Based on the patient's AM-PAC score, and their current ADL deficits, it is recommended that the patient have 2-3 sessions per week of Occupational Therapy at d/c to increase the patient's independence. At this time, this patient demonstrates the endurance and safety to discharge home with 24 hr A, home OT and a follow up treatment frequency of 2-3x/wk. Please see assessment section for further patient specific details. OT Equipment Recommendations  Equipment Needed: No(home DME unknown, defer to home OT)     HOME HEALTH CARE: LEVEL 1 STANDARD    - Initial home health evaluation to occur within 24-48 hours, in patient home   - Therapy to evaluate with goal of regaining prior level of functioning   - Therapy to evaluate if patient has [address] needs for personal care      Assessment   Performance deficits / Impairments: Decreased functional mobility ; Decreased ADL status; Decreased endurance;Decreased cognition;Decreased balance  Treatment Diagnosis: Decreased activity tolerance, impaired ADLs and mobility  Decision Making: Medium Complexity  REQUIRES OT FOLLOW UP: Yes  Activity Tolerance  Activity Tolerance: Patient Tolerated treatment well  Safety Devices  Safety Devices in place: Yes  Type of devices: Call light within reach; Chair alarm in place;Nurse notified; Left in chair         Treatment Diagnosis: Decreased activity tolerance, impaired ADLs and mobility      Restrictions  Position Activity Restriction  Other position/activity restrictions: up as tolerated    Subjective   General  Chart Reviewed: Yes  Additional Pertinent Hx: 68 y.o. F admitted 4/15 after falling at home. +hyperglycemia, UTI. Pt has dementia, lives with her daughter who is non-ambulatory and does nightly PD. PMHx includes PE, CVA, DVT, dementia, schizophrenia, DMII, hysterectomy  Family / Caregiver Present: No  Referring Practitioner: Constantine Smith  Subjective  Subjective: Pt in bed on entry. Somnolent initially, becoming more awake with activity. Pleasantly confused and cooperative. Patient Currently in Pain: Denies  Pain Assessment  Pain Assessment: 0-10  Pain Level: 0    Social/Functional History  Social/Functional History  Lives With: Family(daughter (non-ambulatory, on PD); sister)  Type of Home: House  ADL Assistance: Needs assistance(HHA assist with sponge baths, dressing; pt toilets self)  Homemaking Assistance: (sister and HHA do all housekeeping and cooking)  Ambulation Assistance: Independent(reports HHA assists when there, otherwise walks independently with cane)  Additional Comments: HHA 5 hr/day       Objective   Vision: Impaired  Vision Exceptions: Visual field cut(R; noted during functional mobility, baseline from previous CVA?)  Hearing: Exceptions to [facility]  Hearing Exceptions: Hard of hearing/hearing concerns    Orientation  Overall Orientation Status: Impaired  Orientation Level: Oriented to person;Disoriented to place; Disoriented to time;Disoriented to situation(did [facility] to a doctor that questioned her after session)     Balance  Sitting Balance: Independent  Standing Balance: Contact guard assistance(initially min assist x2, improving to CGA)  Standing Balance  Time: 1 min + 3 min + 5 min  Activity: transfer bed to chair, ambulation chair to bathroom for toileting, ambulation to sink in room and stance for grooming; standard walker used (no rolling available, pt needed cues for safe walker use, like safer with rolling)  Comment: Mod assist x2 initially sit to stand, min assist x2 for stance and min assist x1 to transfer to chair with B hand held assist. Pt improved to min assist x1 sit to stand, CGA to min assist to ambulate with walker, CGA static stance at sink for grooming. Toilet Transfers  Toilet - Technique: Ambulating  Equipment Used: Standard toilet  Toilet Transfer: Minimal assistance  Toilet Transfers Comments: with rolling walker  ADL  Feeding: Modified independent ;Setup;Verbal cueing  Grooming: Supervision;Verbal cueing;Setup  LE Dressing: Moderate assistance;Verbal cueing(to thread brief; pt pull up in standing with CGA)  Toileting: Minimal assistance(pt tried to void but was unable; anticipate some assist for thorough pericare, cues for clothing management)        Bed mobility  Supine to Sit: Contact guard assistance(min cues; HOB partially elevated)  Transfers  Sit to stand: Minimal assistance(varied - mod assist x2 initially, improving to min assist x1)  Stand to sit: Minimal assistance(CGA to min assist)  Vision - Basic Assessment  Visual Field Cut: Right(noted during functional mobility)  Cognition  Overall Cognitive Status: Exceptions  Cognition Comment: dementia + acutely AMS.  alert, partially oriented, follows simple commands with increased time and repetition, requires cues for sequencing and thorough completion of ADLs, impaired memory, insight, and safety awareness           UE Strength  UE Strength Comment: generalized weakness globally, reports ROMULO weaker from CVA, but tested similar to L                  Plan  If pt discharges prior to next tx, this note will serve as d/c summary.  Continue per POC if pt does not d/c.     Plan  Times per week: 2-5x  Times per day: Daily  Current Treatment Recommendations: Strengthening, ROM, Balance Training, Functional Mobility Training, Endurance Training, Self-Care / ADL, Safety Education & Training, Cognitive Reorientation, Equipment Evaluation, Education, & procurement, Patient/Caregiver Education & Training, Cognitive/Perceptual Training      AM-PAC Score        AM-PAC Inpatient Daily Activity Raw Score: 17 (04/16/21 0932)  AM-PAC Inpatient ADL T-Scale Score : 37.26 (04/16/21 0932)  ADL Inpatient CMS 0-100% Score: 50.11 (04/16/21 0932)  ADL Inpatient CMS G-Code Modifier : CK (04/16/21 0932)    Goals  Short term goals  Time Frame for Short term goals: by D/C  Short term goal 1: Complete toileting with SBA - Not met  Short term goal 2: Transfer to/from toilet and chairs with SBA - Not met  Short term goal 3: Stand at sink for grooming with Spvn - Not met       Therapy Time   Individual Concurrent Group Co-treatment   Time In 0830         Time Out 0915         Minutes 45          Timed Code Tx Min: 30  Total Tx Time: 2000 Elizabeth Curtis OT

## 2021-04-17 VITALS
WEIGHT: 252.87 LBS | HEART RATE: 64 BPM | TEMPERATURE: 97.8 F | HEIGHT: 66 IN | SYSTOLIC BLOOD PRESSURE: 138 MMHG | DIASTOLIC BLOOD PRESSURE: 80 MMHG | BODY MASS INDEX: 40.64 KG/M2 | RESPIRATION RATE: 18 BRPM | OXYGEN SATURATION: 99 %

## 2021-04-17 LAB
GLUCOSE BLD-MCNC: 297 MG/DL (ref 70–99)
GLUCOSE BLD-MCNC: 305 MG/DL (ref 70–99)
ORGANISM: ABNORMAL
PERFORMED ON: ABNORMAL
PERFORMED ON: ABNORMAL
URINE CULTURE, ROUTINE: ABNORMAL

## 2021-04-17 PROCEDURE — 2580000003 HC RX 258: Performed by: INTERNAL MEDICINE

## 2021-04-17 PROCEDURE — 6370000000 HC RX 637 (ALT 250 FOR IP): Performed by: INTERNAL MEDICINE

## 2021-04-17 PROCEDURE — 6360000002 HC RX W HCPCS: Performed by: INTERNAL MEDICINE

## 2021-04-17 RX ORDER — CEFDINIR 300 MG/1
300 CAPSULE ORAL 2 TIMES DAILY
Qty: 10 CAPSULE | Refills: 0 | Status: SHIPPED | OUTPATIENT
Start: 2021-04-17 | End: 2021-04-22

## 2021-04-17 RX ORDER — INSULIN LISPRO 100 [IU]/ML
7 INJECTION, SOLUTION INTRAVENOUS; SUBCUTANEOUS
Status: DISCONTINUED | OUTPATIENT
Start: 2021-04-17 | End: 2021-04-17 | Stop reason: HOSPADM

## 2021-04-17 RX ADMIN — Medication 10 ML: at 09:54

## 2021-04-17 RX ADMIN — INSULIN LISPRO 8 UNITS: 100 INJECTION, SOLUTION INTRAVENOUS; SUBCUTANEOUS at 09:58

## 2021-04-17 RX ADMIN — CEFTRIAXONE 1000 MG: 1 INJECTION, POWDER, FOR SOLUTION INTRAMUSCULAR; INTRAVENOUS at 09:54

## 2021-04-17 RX ADMIN — ASPIRIN 325 MG: 325 TABLET, COATED ORAL at 09:54

## 2021-04-17 RX ADMIN — TORSEMIDE 20 MG: 20 TABLET ORAL at 09:55

## 2021-04-17 RX ADMIN — METOPROLOL TARTRATE 25 MG: 25 TABLET, FILM COATED ORAL at 09:55

## 2021-04-17 RX ADMIN — ATORVASTATIN CALCIUM 40 MG: 40 TABLET, FILM COATED ORAL at 09:54

## 2021-04-17 RX ADMIN — ENOXAPARIN SODIUM 40 MG: 40 INJECTION SUBCUTANEOUS at 09:54

## 2021-04-17 ASSESSMENT — PAIN SCALES - GENERAL: PAINLEVEL_OUTOF10: 0

## 2021-04-17 NOTE — CARE COORDINATION
Care     LOC at discharge: Not Applicable  AMBER Completed: Not Indicated    Notification completed in HENS/PAS?:  Not Applicable    IMM Completed:   Not Indicated    Transportation:  Transportation PLAN for discharge: family   Mode of Transport: [address] ordered at discharge: Yes  [address]: [address]   Phone: [phone]  Fax: [phone]  Orders faxed: Yes    Durable Medical Equipment:  DME Provider:    Equipment obtained during hospitalization: none    Home Oxygen and Respiratory Equipment:  Oxygen needed at discharge?: Not [address]: Not Applicable  Portable tank available for discharge?: Not Indicated    Dialysis:  Dialysis patient: No    Dialysis Center:  Not Applicable    Hospice Services:  Location: Not Applicable  Agency: Not Applicable    Consents signed: Not Indicated    Referrals made at [facility] for outpatient continued care:  Not Applicable    Additional CM Notes:  Cm met with pt. She plans to return home at [place]. CM spoke with nurse. Daughter is aware of discharge. Daughter wanting to talk with MD regarding dc. CM called [address] . They are following in Epic. They will pull orders and are aware of dc order today. The Plan for Transition of Care is related to the following treatment goals of UTI (urinary tract infection) [N39.0]    The Patient and/or patient representative Berry Graf and her family were provided with a choice of provider and agrees with the discharge plan Not Indicated    Freedom of choice list was provided with basic dialogue that supports the patient's individualized plan of care/goals and shares the quality data associated with the providers.  Not Indicated    Care Transitions patient: No    David Knutson RN  [facility]  Case Management Department  Ph: [phone]

## 2021-04-17 NOTE — PROGRESS NOTES
Daughter Jeannette Langton called and expressed concern over her mothers blood glucose fluctuating so much. Daughter would really like her mother to stay another day to have better control of her glucose before discharge. Daughter would also like to speak to case management in the morning to see what the cost would be for more extended home care. Will pass on to day shift in the morning.  Will continue to monitor

## 2021-04-17 NOTE — PLAN OF CARE
Problem: Falls - Risk of:  Goal: Will remain free from falls  Description: Will remain free from falls  4/17/2021 1037 by Adam Cohen RN  Outcome: Completed  4/16/2021 2344 by Darrel Brown RN  Outcome: Ongoing  Goal: Absence of physical injury  Description: Absence of physical injury  4/17/2021 1037 by Adam Cohen RN  Outcome: Completed  4/16/2021 2344 by Darrel Brown RN  Outcome: Ongoing     Problem: Skin Integrity:  Goal: Will show no infection signs and symptoms  Description: Will show no infection signs and symptoms  4/17/2021 1037 by Adam Cohen RN  Outcome: Completed  4/16/2021 2344 by Darrel Brown RN  Outcome: Ongoing  Goal: Absence of new skin breakdown  Description: Absence of new skin breakdown  4/17/2021 1037 by Adam Cohen RN  Outcome: Completed  4/16/2021 2344 by Darrel Brown RN  Outcome: Ongoing     Problem: Infection:  Goal: Will remain free from infection  Description: Will remain free from infection  4/17/2021 1037 by Adam Cohen RN  Outcome: Completed  4/16/2021 2344 by Darrel Brown RN  Outcome: Ongoing     Problem: Daily Care:  Goal: Daily care needs are met  Description: Daily care needs are met  4/17/2021 1037 by Adam Cohen RN  Outcome: Completed  4/16/2021 2344 by Darrel Brown RN  Outcome: Ongoing     Problem: Confusion - Acute:  Goal: Absence of continued neurological deterioration signs and symptoms  Description: Absence of continued neurological deterioration signs and symptoms  4/17/2021 1037 by Adam Cohen RN  Outcome: Completed  4/16/2021 2344 by Darrel Brown RN  Outcome: Ongoing  Goal: Mental status will be restored to baseline  Description: Mental status will be restored to baseline  4/17/2021 1037 by Adam Cohen RN  Outcome: Completed  4/16/2021 2344 by Darrel Brown RN  Outcome: Ongoing     Problem: Discharge Planning:  Goal: Ability to perform activities of daily living will improve  Description: Ability to perform activities of daily living will improve  4/17/2021 1037 by Apple Velasquez RN  Outcome: Completed  4/16/2021 2344 by Arden Pinon RN  Outcome: Ongoing  Goal: Participates in care planning  Description: Participates in care planning  4/17/2021 1037 by Apple Velasquez RN  Outcome: Completed  4/16/2021 2344 by Arden Pinon RN  Outcome: Ongoing     Problem: Injury - Risk of, Physical Injury:  Goal: Will remain free from falls  Description: Will remain free from falls  4/17/2021 1037 by Apple Velasquez RN  Outcome: Completed  4/16/2021 2344 by Arden Pinon RN  Outcome: Ongoing  Goal: Absence of physical injury  Description: Absence of physical injury  4/17/2021 1037 by Apple Velasquez RN  Outcome: Completed  4/16/2021 2344 by Arden Pinon RN  Outcome: Ongoing     Problem: Mood - Altered:  Goal: Mood stable  Description: Mood stable  4/17/2021 1037 by Apple Velasquez RN  Outcome: Completed  4/16/2021 2344 by Arden Pinon RN  Outcome: Ongoing  Goal: Absence of abusive behavior  Description: Absence of abusive behavior  4/17/2021 1037 by Apple Velasquez RN  Outcome: Completed  4/16/2021 2344 by Arden Pinon RN  Outcome: Ongoing  Goal: Verbalizations of feeling emotionally comfortable while being cared for will increase  Description: Verbalizations of feeling emotionally comfortable while being cared for will increase  4/17/2021 1037 by Apple Velasquez RN  Outcome: Completed  4/16/2021 2344 by Arden Pinon RN  Outcome: Ongoing     Problem: Psychomotor Activity - Altered:  Goal: Absence of psychomotor disturbance signs and symptoms  Description: Absence of psychomotor disturbance signs and symptoms  4/17/2021 1037 by Apple Velasquez RN  Outcome: Completed  4/16/2021 2344 by Arden Pinon RN  Outcome: Ongoing     Problem: Sensory Perception - Impaired:  Goal: Demonstrations of improved sensory functioning will increase  Description: Demonstrations of improved sensory functioning will increase  4/17/2021 1037 by Apple Velasquez

## 2021-04-17 NOTE — DISCHARGE INSTR - COC
Continuity of Care Form    Patient Name: Jairo Nelson   :  1940  MRN:  [id number]    Admit date:  4/15/2021  Discharge date:  ***    Code Status Order: Full Code   Advance Directives:   Advance Care Flowsheet Documentation     Date/Time Healthcare Directive Type of Healthcare Directive Copy in [address] Agent's Name Healthcare Agent's Phone Number    04/15/21 1204  Yes, patient has an advance directive for healthcare treatment  Living will;Durable power of  for health care  Yes, copy in chart  Adult [address]  [phone]          Admitting Physician:  Romana Gonzales MD  PCP: Alexus Casper    Discharging Nurse: [facility] Unit/Room#: 8346/5568-74  Discharging Unit Phone Number: ***    Emergency Contact:   Extended Emergency Contact Information  Primary Emergency Contact: [address] [place] [address] Phone: [phone]  Relation: Child  Secondary Emergency Contact: [address] [facility] Phone: [phone]  Relation: Child    Past Surgical History:  Past Surgical History:   Procedure Laterality Date    HYSTERECTOMY      [facility] for abnormal uterine bleeding    OTHER SURGICAL HISTORY  10/01/2013    EUS, EGD       Immunization History: There is no immunization history for the selected administration types on file for this patient.     Active Problems:  Patient Active Problem List   Diagnosis Code    Essential hypertension I10    Cerebral infarction ([facility] [address] I63.9    Hemiparesis, left ([facility]) G81.94    GI bleed K92.2    Hypovolemia E86.1    Iron deficiency E61.1    Acute posthemorrhagic anemia D62    Chest pain R07.9    Hyperlipidemia E78.5    DMII (diabetes mellitus, type 2) ([facility]) E11.9    Morbid obesity due to excess calories ([facility]) E66.01    UTI (urinary tract infection) N39.0       Isolation/Infection:   Isolation          No Isolation        Patient Infection Status     None to display          Nurse Assessment:  Last Vital Signs: BP (!) 124/54   Pulse 56   Temp 97.5 °F (36.4 °C) (Oral)   Resp 18   Ht 5' 6\" (1.676 m)   Wt 252 lb 13.9 oz (114.7 kg)   SpO2 96%   BMI 40.81 kg/m²     Last documented pain score (0-10 scale): Pain Level: 0  Last Weight:   Wt Readings from Last 1 Encounters:   04/15/21 252 lb 13.9 oz (114.7 kg)     Mental Status:  {IP PT MENTAL STATUS:20030}    IV Access:  { AMBER IV ACCESS:126753088}    Nursing Mobility/ADLs:  Walking   {P DME YIWS:340521835}  Transfer  {[facility] DME ODLZ:152905861}  Bathing  {P DME RKCE:120458397}  Dressing  {P DME WHFY:613043032}  Toileting  {[facility] DME EYCC:215243905}  Feeding  {[facility] DME XPBL:691128177}  Med Admin  {[facility] DME XXSN:799590125}  Med Delivery   { AMBER MED Delivery:636941459}    Wound Care Documentation and Therapy:        Elimination:  Continence:   · Bowel: {YES / QM:28104}  · Bladder: {YES / XI:88827}  Urinary Catheter: {Urinary Catheter:074126192}   Colostomy/Ileostomy/Ileal Conduit: {YES / QJ:42956}       Date of Last BM: ***    Intake/Output Summary (Last 24 hours) at 4/17/2021 0952  Last data filed at 4/17/2021 0501  Gross per 24 hour   Intake 1457.5 ml   Output 2400 ml   Net -942.5 ml     I/O last 3 completed shifts: In: 1817.5 [P.O.:600;  I.V.:1167.5; IV Piggyback:50]  Out: 2700 [Urine:2700]    Safety Concerns:     [address] [employer] Safety Concerns:127836025}    Impairments/Disabilities:      [address] [employer] Impairments/Disabilities:948763800}    Nutrition Therapy:  Current Nutrition Therapy:   [address] [employer] Diet List:548957045}    Routes of Feeding: {[facility] DME Other Feedings:543628241}  Liquids: {Slp liquid thickness:57225}  Daily Fluid Restriction: {CHP DME Yes amt example:556580408}  Last Modified Barium Swallow with Video (Video Swallowing Test): {Done Not Done IEHO:843968880}    Treatments at the Time of Hospital Discharge:   Respiratory Treatments: ***  Oxygen Therapy:  {Therapy; copd oxygen:03155}  Ventilator:    { CC Vent EGBV:997178441}    Rehab Therapies: {THERAPEUTIC INTERVENTION:7957917859}  Weight Bearing Status/Restrictions: [address] [facility] CC Weight Bearin}  Other Medical Equipment (for information only, NOT a DME order):  {EQUIPMENT:115654531}  Other Treatments: ***    Patient's personal belongings (please select all that are sent with patient):  {CHP DME Belongings:520881981}    RN SIGNATURE:  {Esignature:442891493}    CASE MANAGEMENT/SOCIAL WORK SECTION    Inpatient Status Date: ***    Readmission Risk Assessment Score:  Readmission Risk              Risk of Unplanned Readmission:        16           Discharging to Facility/  [place] [address]       Phone: [phone]       Fax: [phone]        ·     Dialysis Facility (if applicable)   · Name:  · Address:  · Dialysis Schedule:  · Phone:  · Fax:    / signature: {Esignature:486345337}    PHYSICIAN SECTION    Prognosis: {Prognosis:0264669423}    Condition at Discharge: [address] [facility]paricio Patient Condition:646276573}    Rehab Potential (if transferring to Rehab): {Prognosis:6297509391}    Recommended Labs or Other Treatments After Discharge: ***    Physician Certification: I certify the above information and transfer of Bear De Oliveira  is necessary for the continuing treatment of the diagnosis listed and that she requires {Admit to Appropriate Level of Care:59529} for {GREATER/LESS:628571985} 30 days.      Update Admission H&P: {CHP DME Changes in PWJTD:168147090}    PHYSICIAN SIGNATURE:  {Esignature:134550137}

## 2021-04-17 NOTE — PLAN OF CARE
falls  4/16/2021 2344 by Darrel Brown RN  Outcome: Ongoing     Problem: Injury - Risk of, Physical Injury:  Goal: Absence of physical injury  Description: Absence of physical injury  4/16/2021 2344 by Darrel Brown RN  Outcome: Ongoing     Problem: Mood - Altered:  Goal: Mood stable  Description: Mood stable  4/16/2021 2344 by Darrel Brown RN  Outcome: Ongoing     Problem: Mood - Altered:  Goal: Absence of abusive behavior  Description: Absence of abusive behavior  4/16/2021 2344 by Darrel Brown RN  Outcome: Ongoing     Problem: Mood - Altered:  Goal: Verbalizations of feeling emotionally comfortable while being cared for will increase  Description: Verbalizations of feeling emotionally comfortable while being cared for will increase  4/16/2021 2344 by Darrel Brown RN  Outcome: Ongoing     Problem: Psychomotor Activity - Altered:  Goal: Absence of psychomotor disturbance signs and symptoms  Description: Absence of psychomotor disturbance signs and symptoms  4/16/2021 2344 by Darrel Brown RN  Outcome: Ongoing     Problem: Sensory Perception - Impaired:  Goal: Demonstrations of improved sensory functioning will increase  Description: Demonstrations of improved sensory functioning will increase  4/16/2021 2344 by Darrel Brown RN  Outcome: Ongoing     Problem: Sensory Perception - Impaired:  Goal: Decrease in sensory misperception frequency  Description: Decrease in sensory misperception frequency  4/16/2021 2344 by Darrel Brown RN  Outcome: Ongoing     Problem: Sensory Perception - Impaired:  Goal: Able to refrain from responding to false sensory perceptions  Description: Able to refrain from responding to false sensory perceptions  4/16/2021 2344 by Darrel Brown RN  Outcome: Ongoing     Problem: Sensory Perception - Impaired:  Goal: Demonstrates accurate environmental perceptions  Description: Demonstrates accurate environmental perceptions  4/16/2021 2344 by Darrel Brown RN  Outcome:

## 2021-04-17 NOTE — DISCHARGE SUMMARY
Hospital Medicine Discharge Summary    Patient ID: Michelle Goff      Patient's PCP: Haley Michael    Admit Date: 4/15/2021     Discharge Date:   4/17/2021    Admitting Physician: Son Doyle MD     Discharge Physician: Son Doyle MD     Discharge Diagnoses: Active Hospital Problems    Diagnosis Date Noted    UTI (urinary tract infection) [N39.0] 04/15/2021       The patient was seen and examined on day of discharge and this discharge summary is in conjunction with any daily progress note from day of discharge. Hospital Course: Patient is 62-year-old female with history of schizophrenia, diabetes mellitus type 2, DVT status post IVC filter, uses cane, dementia came into ER via EMS after she had a fall in kitchen.  Patient denies any loss of consciousness.  Due to dementia ROS and history is limited. I talked to patient's daughter [place] said patient lives at home along with her disabled sister was on daily dialysis and cannot ambulate. Juliette Patton have a health aide comes 5 hours daily.  Due to her dementia she has a electric dispenser of medication.     On arrival blood pressure 144/70. Blood sugar 528.  WBC 14.4.  UA suggestive of UTI. Patient was admitted for UTI. Urine cultures positive for Klebsiella pneumonia. Will discharge on cefdinir for 5 days. Patient was also noted to have hyperglycemia apparently she was missing her glipizide. Hemoglobin A1c 10.2. I did detailed discussion with patient's daughter Yohannes Demarco regarding patient's diabetes management. She will not be a good candidate for insulin because of her dementia and she has no supervision 24/7. After discussion we will start her on Janumet along with glipizide she will follow up with PCP. I encouraged her to watch her diet. Patient was seen and examined on day of discharge alert oriented x2. Denies any nausea, vomiting, fever, chills. No acute event reported overnight.     Diagnosis  #UTI with Klebsiella pneumonia pansensitive will discharge on cefdinir. #Diabetes mellitus type 2 uncontrolled hemoglobin A1c 10.4. After discussion with POA we will add Januvia with glipizide. Per POA patient is intolerant to Metformin. Follow-up with PCP  #Hypertension  #History of CVA  #History of DVT status post IVC filter  #Schizophrenia. Patient had refills on Risperdal but it was never filled since November 2020. Discussed with POA to follow-up as an outpatient. Physical Exam Performed:     /80   Pulse 64   Temp 97.8 °F (36.6 °C) (Oral)   Resp 18   Ht 5' 6\" (1.676 m)   Wt 252 lb 13.9 oz (114.7 kg)   SpO2 99%   BMI 40.81 kg/m²       General appearance: Elderly, NAD on chair. HEENT:  Normal cephalic, atraumatic without obvious deformity. Pupils equal, round, and reactive to light. Extra ocular muscles intact. Conjunctivae/corneas clear. Neck: Supple, with full range of motion. No jugular venous distention. Trachea midline. Respiratory:  Normal respiratory effort. Clear to auscultation, bilaterally without Rales/Wheezes/Rhonchi. Cardiovascular:  Regular rate and rhythm with normal S1/S2 without murmurs, rubs or gallops. Abdomen: Soft, non-tender, non-distended with normal bowel sounds. Musculoskeletal: 1+ bilateral lower extremity edema. Skin: Skin color, texture, turgor normal.  No rashes or lesions. Neurologic:  Neurovascularly intact without any focal sensory/motor deficits. Cranial nerves: II-XII intact, grossly non-focal.  Psychiatric:  Alert and orientedx2  Capillary Refill: Brisk,< 3 seconds   Peripheral Pulses: +2 palpable, equal bilaterally       Labs:  For convenience and continuity at follow-up the following most recent labs are provided:      CBC:    Lab Results   Component Value Date    WBC 9.4 04/16/2021    HGB 11.0 04/16/2021    HCT 33.2 04/16/2021     04/16/2021       Renal:    Lab Results   Component Value Date     04/16/2021    K 4.0 04/16/2021     04/16/2021    CO2 27 04/16/2021    BUN 14 04/16/2021    CREATININE 1.0 04/16/2021    CALCIUM 10.6 04/16/2021    PHOS 3.2 09/13/2013         Significant Diagnostic Studies    Radiology:   XR CHEST PORTABLE   Final Result      No pneumothorax. Mildly increased prominence of a linear consolidation in the right midlung in comparison to chest x-ray of 9/29/2020. This was seen on the chest CT of 2/26/2020, with follow-up CT recommended. Again, nonemergent follow-up chest CT is recommended. Left lung is clear. Stable cardiomediastinal silhouette. XR KNEE RIGHT (1-2 VIEWS)   Final Result     No acute findings in the right knee. XR KNEE LEFT (1-2 VIEWS)   Final Result     No acute findings in the left knee. Progressive degenerative changes    involving the medial compartment. XR PELVIS (1-2 VIEWS)   Final Result     No acute findings in the pelvis. XR TIBIA FIBULA LEFT (2 VIEWS)   Final Result     No acute findings in the left tibia and fibula or surrounding soft    tissues. XR TIBIA FIBULA RIGHT (2 VIEWS)   Final Result   Soft tissue fullness noted at the ankle. Otherwise negative examination. XR ANKLE LEFT (MIN 3 VIEWS)   Final Result     Soft tissue fullness overlying the lateral malleolus. No acute osseous    traumatic injury or abnormal alignment. XR ANKLE RIGHT (MIN 3 VIEWS)   Final Result     Overlying soft tissue swelling noted both circumferentially about the    ankle and extending into the superior mid foot. No acute osseous    traumatic injury with stable chronic changes involving the medial    malleolus. CT HEAD WO CONTRAST   Final Result     No acute intracranial process or significant alteration from the    previous examination with underlying incidental chronic changes, as    detailed above, stable in appearance. CT CERVICAL SPINE WO CONTRAST   Final Result   1.   No acute osseous traumatic injury or significant abnormal alignment    involving errors      Signed:    Paola Panda MD   4/17/2021      Thank you Franci Romero for the opportunity to be involved in this patient's care. If you have any questions or concerns please feel free to contact me at [phone].

## 2021-04-17 NOTE — PROGRESS NOTES
Pt d/c to home with caregiver via private vehicle. AVS reviewed and signed by pt, no questions/ concerns at this time. All personal belongings taken from room by pt. Pt escorted by wheelchair to main entrance and assisted into vehicle. Tolerated well.

## 2021-04-23 ENCOUNTER — FOLLOWUP TELEPHONE ENCOUNTER (OUTPATIENT)
Dept: TELEMETRY | Age: 81
End: 2021-04-23

## 2021-05-14 ENCOUNTER — APPOINTMENT (OUTPATIENT)
Dept: MRI IMAGING | Age: 81
DRG: 064 | End: 2021-05-14
Payer: MEDICARE

## 2021-05-14 ENCOUNTER — APPOINTMENT (OUTPATIENT)
Dept: GENERAL RADIOLOGY | Age: 81
DRG: 064 | End: 2021-05-14
Payer: MEDICARE

## 2021-05-14 ENCOUNTER — HOSPITAL ENCOUNTER (INPATIENT)
Age: 81
LOS: 7 days | Discharge: NURSING, REHAB OR LONG-TERM CARE | DRG: 064 | End: 2021-05-21
Attending: EMERGENCY MEDICINE | Admitting: INTERNAL MEDICINE
Payer: MEDICARE

## 2021-05-14 ENCOUNTER — APPOINTMENT (OUTPATIENT)
Dept: CT IMAGING | Age: 81
DRG: 064 | End: 2021-05-14
Payer: MEDICARE

## 2021-05-14 DIAGNOSIS — R41.82 ALTERED MENTAL STATUS, UNSPECIFIED ALTERED MENTAL STATUS TYPE: Primary | ICD-10-CM

## 2021-05-14 DIAGNOSIS — R53.1 GENERAL WEAKNESS: ICD-10-CM

## 2021-05-14 LAB
A/G RATIO: 0.9 (ref 1.1–2.2)
ALBUMIN SERPL-MCNC: 3.7 G/DL (ref 3.4–5)
ALP BLD-CCNC: 128 U/L (ref 40–129)
ALT SERPL-CCNC: 18 U/L (ref 10–40)
ANION GAP SERPL CALCULATED.3IONS-SCNC: 9 MMOL/L (ref 3–16)
AST SERPL-CCNC: 20 U/L (ref 15–37)
BASE EXCESS VENOUS: 1.7 MMOL/L (ref -2–3)
BASOPHILS ABSOLUTE: 0.1 K/UL (ref 0–0.2)
BASOPHILS RELATIVE PERCENT: 1 %
BILIRUB SERPL-MCNC: 0.5 MG/DL (ref 0–1)
BILIRUBIN URINE: NEGATIVE
BLOOD, URINE: NEGATIVE
BUN BLDV-MCNC: 11 MG/DL (ref 7–20)
CALCIUM SERPL-MCNC: 11.2 MG/DL (ref 8.3–10.6)
CARBOXYHEMOGLOBIN: 1 % (ref 0–1.5)
CHLORIDE BLD-SCNC: 108 MMOL/L (ref 99–110)
CLARITY: CLEAR
CO2: 25 MMOL/L (ref 21–32)
COLOR: YELLOW
CREAT SERPL-MCNC: 0.9 MG/DL (ref 0.6–1.2)
EKG ATRIAL RATE: 71 BPM
EKG DIAGNOSIS: NORMAL
EKG P AXIS: 52 DEGREES
EKG P-R INTERVAL: 144 MS
EKG Q-T INTERVAL: 378 MS
EKG QRS DURATION: 86 MS
EKG QTC CALCULATION (BAZETT): 410 MS
EKG R AXIS: -17 DEGREES
EKG T AXIS: 53 DEGREES
EKG VENTRICULAR RATE: 71 BPM
EOSINOPHILS ABSOLUTE: 0.2 K/UL (ref 0–0.6)
EOSINOPHILS RELATIVE PERCENT: 1.9 %
GFR AFRICAN AMERICAN: >60
GFR NON-AFRICAN AMERICAN: >60
GLOBULIN: 3.9 G/DL
GLUCOSE BLD-MCNC: 188 MG/DL (ref 70–99)
GLUCOSE BLD-MCNC: 222 MG/DL (ref 70–99)
GLUCOSE BLD-MCNC: 231 MG/DL (ref 70–99)
GLUCOSE URINE: NEGATIVE MG/DL
HCO3 VENOUS: 28.3 MMOL/L (ref 24–28)
HCT VFR BLD CALC: 38.3 % (ref 36–48)
HEMOGLOBIN: 12.4 G/DL (ref 12–16)
KETONES, URINE: NEGATIVE MG/DL
LACTIC ACID, SEPSIS: 1.4 MMOL/L (ref 0.4–1.9)
LACTIC ACID, SEPSIS: 1.8 MMOL/L (ref 0.4–1.9)
LEUKOCYTE ESTERASE, URINE: NEGATIVE
LYMPHOCYTES ABSOLUTE: 1.8 K/UL (ref 1–5.1)
LYMPHOCYTES RELATIVE PERCENT: 23.1 %
MCH RBC QN AUTO: 27.3 PG (ref 26–34)
MCHC RBC AUTO-ENTMCNC: 32.5 G/DL (ref 31–36)
MCV RBC AUTO: 84 FL (ref 80–100)
METHEMOGLOBIN VENOUS: 0.3 % (ref 0–1.5)
MICROSCOPIC EXAMINATION: NORMAL
MONOCYTES ABSOLUTE: 0.8 K/UL (ref 0–1.3)
MONOCYTES RELATIVE PERCENT: 9.8 %
NEUTROPHILS ABSOLUTE: 5.1 K/UL (ref 1.7–7.7)
NEUTROPHILS RELATIVE PERCENT: 64.2 %
NITRITE, URINE: NEGATIVE
O2 SAT, VEN: 53 %
PCO2, VEN: 50.5 MMHG (ref 41–51)
PDW BLD-RTO: 16.1 % (ref 12.4–15.4)
PERFORMED ON: ABNORMAL
PERFORMED ON: ABNORMAL
PH UA: 6 (ref 5–8)
PH VENOUS: 7.36 (ref 7.35–7.45)
PLATELET # BLD: 260 K/UL (ref 135–450)
PMV BLD AUTO: 8.9 FL (ref 5–10.5)
PO2, VEN: 30.6 MMHG (ref 25–40)
POTASSIUM REFLEX MAGNESIUM: 4.7 MMOL/L (ref 3.5–5.1)
PRO-BNP: 76 PG/ML (ref 0–449)
PROTEIN UA: NEGATIVE MG/DL
RBC # BLD: 4.56 M/UL (ref 4–5.2)
SODIUM BLD-SCNC: 142 MMOL/L (ref 136–145)
SPECIFIC GRAVITY UA: 1.02 (ref 1–1.03)
TCO2 CALC VENOUS: 30 MMOL/L
TOTAL PROTEIN: 7.6 G/DL (ref 6.4–8.2)
URINE REFLEX TO CULTURE: NORMAL
URINE TYPE: NORMAL
UROBILINOGEN, URINE: 0.2 E.U./DL
WBC # BLD: 8 K/UL (ref 4–11)

## 2021-05-14 PROCEDURE — 83036 HEMOGLOBIN GLYCOSYLATED A1C: CPT

## 2021-05-14 PROCEDURE — 70450 CT HEAD/BRAIN W/O DYE: CPT

## 2021-05-14 PROCEDURE — 83605 ASSAY OF LACTIC ACID: CPT

## 2021-05-14 PROCEDURE — 70551 MRI BRAIN STEM W/O DYE: CPT

## 2021-05-14 PROCEDURE — 70544 MR ANGIOGRAPHY HEAD W/O DYE: CPT

## 2021-05-14 PROCEDURE — 1200000000 HC SEMI PRIVATE

## 2021-05-14 PROCEDURE — 80053 COMPREHEN METABOLIC PANEL: CPT

## 2021-05-14 PROCEDURE — 6360000002 HC RX W HCPCS: Performed by: INTERNAL MEDICINE

## 2021-05-14 PROCEDURE — 36415 COLL VENOUS BLD VENIPUNCTURE: CPT

## 2021-05-14 PROCEDURE — A9576 INJ PROHANCE MULTIPACK: HCPCS | Performed by: PSYCHIATRY & NEUROLOGY

## 2021-05-14 PROCEDURE — 2580000003 HC RX 258: Performed by: INTERNAL MEDICINE

## 2021-05-14 PROCEDURE — 71045 X-RAY EXAM CHEST 1 VIEW: CPT

## 2021-05-14 PROCEDURE — 93005 ELECTROCARDIOGRAM TRACING: CPT | Performed by: EMERGENCY MEDICINE

## 2021-05-14 PROCEDURE — 99285 EMERGENCY DEPT VISIT HI MDM: CPT

## 2021-05-14 PROCEDURE — 70549 MR ANGIOGRAPH NECK W/O&W/DYE: CPT

## 2021-05-14 PROCEDURE — 6370000000 HC RX 637 (ALT 250 FOR IP): Performed by: INTERNAL MEDICINE

## 2021-05-14 PROCEDURE — 81003 URINALYSIS AUTO W/O SCOPE: CPT

## 2021-05-14 PROCEDURE — 82803 BLOOD GASES ANY COMBINATION: CPT

## 2021-05-14 PROCEDURE — 85025 COMPLETE CBC W/AUTO DIFF WBC: CPT

## 2021-05-14 PROCEDURE — 83880 ASSAY OF NATRIURETIC PEPTIDE: CPT

## 2021-05-14 PROCEDURE — 6360000004 HC RX CONTRAST MEDICATION: Performed by: PSYCHIATRY & NEUROLOGY

## 2021-05-14 RX ORDER — NICOTINE POLACRILEX 4 MG
15 LOZENGE BUCCAL PRN
Status: DISCONTINUED | OUTPATIENT
Start: 2021-05-14 | End: 2021-05-21 | Stop reason: HOSPADM

## 2021-05-14 RX ORDER — ASPIRIN 300 MG/1
300 SUPPOSITORY RECTAL DAILY
Status: DISCONTINUED | OUTPATIENT
Start: 2021-05-14 | End: 2021-05-15

## 2021-05-14 RX ORDER — SODIUM CHLORIDE 0.9 % (FLUSH) 0.9 %
10 SYRINGE (ML) INJECTION EVERY 12 HOURS SCHEDULED
Status: DISCONTINUED | OUTPATIENT
Start: 2021-05-14 | End: 2021-05-21 | Stop reason: HOSPADM

## 2021-05-14 RX ORDER — INSULIN LISPRO 100 [IU]/ML
0-12 INJECTION, SOLUTION INTRAVENOUS; SUBCUTANEOUS
Status: DISCONTINUED | OUTPATIENT
Start: 2021-05-14 | End: 2021-05-20

## 2021-05-14 RX ORDER — PROMETHAZINE HYDROCHLORIDE 12.5 MG/1
12.5 TABLET ORAL EVERY 6 HOURS PRN
Status: DISCONTINUED | OUTPATIENT
Start: 2021-05-14 | End: 2021-05-21 | Stop reason: HOSPADM

## 2021-05-14 RX ORDER — ONDANSETRON 2 MG/ML
4 INJECTION INTRAMUSCULAR; INTRAVENOUS EVERY 6 HOURS PRN
Status: DISCONTINUED | OUTPATIENT
Start: 2021-05-14 | End: 2021-05-21 | Stop reason: HOSPADM

## 2021-05-14 RX ORDER — INSULIN GLARGINE 100 [IU]/ML
20 INJECTION, SOLUTION SUBCUTANEOUS EVERY MORNING
Status: ON HOLD | COMMUNITY
End: 2021-05-21 | Stop reason: SDUPTHER

## 2021-05-14 RX ORDER — SODIUM CHLORIDE 0.9 % (FLUSH) 0.9 %
10 SYRINGE (ML) INJECTION PRN
Status: DISCONTINUED | OUTPATIENT
Start: 2021-05-14 | End: 2021-05-21 | Stop reason: HOSPADM

## 2021-05-14 RX ORDER — SODIUM CHLORIDE 9 MG/ML
25 INJECTION, SOLUTION INTRAVENOUS PRN
Status: DISCONTINUED | OUTPATIENT
Start: 2021-05-14 | End: 2021-05-21 | Stop reason: HOSPADM

## 2021-05-14 RX ORDER — DEXTROSE MONOHYDRATE 25 G/50ML
12.5 INJECTION, SOLUTION INTRAVENOUS PRN
Status: DISCONTINUED | OUTPATIENT
Start: 2021-05-14 | End: 2021-05-21 | Stop reason: HOSPADM

## 2021-05-14 RX ORDER — SODIUM CHLORIDE 9 MG/ML
INJECTION, SOLUTION INTRAVENOUS CONTINUOUS
Status: DISCONTINUED | OUTPATIENT
Start: 2021-05-14 | End: 2021-05-16

## 2021-05-14 RX ORDER — INSULIN LISPRO 100 [IU]/ML
0-6 INJECTION, SOLUTION INTRAVENOUS; SUBCUTANEOUS NIGHTLY
Status: DISCONTINUED | OUTPATIENT
Start: 2021-05-14 | End: 2021-05-20

## 2021-05-14 RX ORDER — DEXTROSE MONOHYDRATE 50 MG/ML
100 INJECTION, SOLUTION INTRAVENOUS PRN
Status: DISCONTINUED | OUTPATIENT
Start: 2021-05-14 | End: 2021-05-21 | Stop reason: HOSPADM

## 2021-05-14 RX ORDER — ATORVASTATIN CALCIUM 40 MG/1
40 TABLET, FILM COATED ORAL NIGHTLY
Status: DISCONTINUED | OUTPATIENT
Start: 2021-05-14 | End: 2021-05-21 | Stop reason: HOSPADM

## 2021-05-14 RX ORDER — ASPIRIN 81 MG/1
81 TABLET ORAL DAILY
Status: DISCONTINUED | OUTPATIENT
Start: 2021-05-14 | End: 2021-05-15

## 2021-05-14 RX ADMIN — ENOXAPARIN SODIUM 40 MG: 40 INJECTION SUBCUTANEOUS at 20:48

## 2021-05-14 RX ADMIN — ATORVASTATIN CALCIUM 40 MG: 40 TABLET, FILM COATED ORAL at 20:48

## 2021-05-14 RX ADMIN — GADOTERIDOL 20 ML: 279.3 INJECTION, SOLUTION INTRAVENOUS at 22:44

## 2021-05-14 RX ADMIN — SODIUM CHLORIDE: 9 INJECTION, SOLUTION INTRAVENOUS at 18:58

## 2021-05-14 RX ADMIN — INSULIN LISPRO 2 UNITS: 100 INJECTION, SOLUTION INTRAVENOUS; SUBCUTANEOUS at 23:19

## 2021-05-14 RX ADMIN — Medication 10 ML: at 20:48

## 2021-05-14 RX ADMIN — ASPIRIN 81 MG: 81 TABLET, COATED ORAL at 20:48

## 2021-05-14 NOTE — ED PROVIDER NOTES
[address] [facility]          ATTENDING PHYSICIAN NOTE       Date of evaluation: 5/14/2021    Chief Complaint     Other (called EMS for poss. stroke. LKN 10:50 )      History of Present Illness     Helga Aviles is a 68 y.o. female with history of schizophrenia, diabetes mellitus type 2, DVT w/ IVC filter, prior stroke with left weakness, and dementia who presents because her home health aide who was there to look after her this morning noticed that she had a right eye droop and that she seemed generally weaker. The patient herself is a very poor historian due to her dementia and cannot give any further history. The patient was admitted last month after a fall in her kitchen and was found to have a urinary tract infection with cultures positive for Klebsiella. She was discharged on 5 days of cefdinir on 4/17. The home health aide finally arrived and noted that the patient had several things different today than yesterday. She was last seen yesterday in the afternoon. At that time she was able to get up and walk around with her walker and was eating fine and acting normal.  When she arrived today she was a very different person. She initially thought she had some right sided weakness and a right upper eyelid droop but no right lower facial droop. She was concerned about stroke or UTI. The patient herself is not able to contribute much at all to the history.   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 She has a past surgical history that includes Hysterectomy (2011) and other surgical history (10/01/2013). Her family history is not on file. She reports that she has never smoked. She has never used smokeless tobacco. She reports that she does not drink alcohol or use drugs. Medications     Previous Medications    ACETAMINOPHEN (TYLENOL) 325 MG TABLET    Take 650 mg by mouth every 4 hours as needed for Pain    ASPIRIN 325 MG EC TABLET    Take 325 mg by mouth daily    ATORVASTATIN (LIPITOR) 40 MG TABLET    Take 40 mg by mouth daily    ATORVASTATIN (LIPITOR) 40 MG TABLET    Take 40 mg by mouth daily    FAMOTIDINE (PEPCID) 20 MG TABLET    Take 20 mg by mouth daily    GLIPIZIDE (GLUCOTROL) 5 MG TABLET    Take 5 mg by mouth 2 times daily (before meals)    METOPROLOL TARTRATE (LOPRESSOR) 25 MG TABLET    Take 25 mg by mouth 2 times daily    MINERAL OIL-HYDROPHILIC PETROLATUM (AQUAPHOR) OINTMENT    Apply topically 2 times daily Apply topically    RISPERIDONE (RISPERDAL) 2 MG TABLET    Take 2 mg by mouth nightly    SITAGLIPTIN (JANUVIA) 100 MG TABLET    Take 1 tablet by mouth daily    TORSEMIDE (DEMADEX) 20 MG TABLET    Take 80 mg by mouth 2 times daily       Allergies     She has No Known Allergies. Physical Exam     INITIAL VITALS: BP: (!) 144/76, Temp: 97.7 °F (36.5 °C), Pulse: 79, Resp: 16, SpO2: 98 %   Physical Exam  Vitals signs and nursing note reviewed. Constitutional:       General: She is not in acute distress. Appearance: She is well-developed. She is not diaphoretic. HENT:      Head: Normocephalic and atraumatic. Eyes:      Extraocular Movements: Extraocular movements intact. Pupils: Pupils are equal, round, and reactive to light. Comments: Right eyelid seems to lag just a little bit but there is normal strength and she is able to keep her eyes closed against resistance and open her eyes but not fully on the right. Slight right upper eyelid swelling. Neck:      Musculoskeletal: Normal range of motion. Cardiovascular:      Rate and Rhythm: Normal rate and regular rhythm. Pulmonary:      Effort: Pulmonary effort is normal.      Breath sounds: Normal breath sounds. Abdominal:      General: Bowel sounds are normal. There is no distension. Palpations: Abdomen is soft. Tenderness: There is no abdominal tenderness. Skin:     General: Skin is warm and dry. Neurological:      Mental Status: She is alert. Cranial Nerves: No cranial nerve deficit or facial asymmetry. Sensory: Sensation is intact. Motor: Weakness (general/equal bilateral) present. Diagnostic Results     EKG       RADIOLOGY:  XR CHEST PORTABLE   Final Result      Elevated right hemidiaphragm. Clear lungs. Stable top normal heart size. CT HEAD WO CONTRAST   Final Result      Age-related changes and old infarct without acute intracranial abnormality.             MRI BRAIN WO CONTRAST    (Results Pending)   MRA HEAD WO CONTRAST    (Results Pending)   MRA NECK W CONTRAST    (Results Pending)       LABS:   Results for orders placed or performed during the hospital encounter of 05/14/21   CBC Auto Differential   Result Value Ref Range    WBC 8.0 4.0 - 11.0 K/uL    RBC 4.56 4.00 - 5.20 M/uL    Hemoglobin 12.4 12.0 - 16.0 g/dL    Hematocrit 38.3 36.0 - 48.0 %    MCV 84.0 80.0 - 100.0 fL    MCH 27.3 26.0 - 34.0 pg    MCHC 32.5 31.0 - 36.0 g/dL    RDW 16.1 (H) 12.4 - 15.4 %    Platelets 321 900 - 886 K/uL    MPV 8.9 5.0 - 10.5 fL    Neutrophils % 64.2 %    Lymphocytes % 23.1 %    Monocytes % 9.8 % Mookie 1.7 -2.0 - 3.0 mmol/L    O2 Sat, Mookie 53 Not established %    Carboxyhemoglobin 1.0 0.0 - 1.5 %    MetHgb, Mookie 0.3 0.0 - 1.5 %    TC02 (Calc), Mookie 30 mmol/L   POCT Glucose   Result Value Ref Range    POC Glucose 188 (H) 70 - 99 mg/dl    Performed on ACCU-CHEK    EKG 12 Lead   Result Value Ref Range    Ventricular Rate 71 BPM    Atrial Rate 71 BPM    P-R Interval 144 ms    QRS Duration 86 ms    Q-T Interval 378 ms    QTc Calculation (Bazett) 410 ms    P Axis 52 degrees    R Axis -17 degrees    T Axis 53 degrees    Diagnosis       EKG performed in ER and to be interpreted by ER physician. Confirmed by MD, ER ([id number]),  Joey Davis ([id number]) on 5/14/2021 1:04:45 PM       RECENT VITALS:  BP: (!) 166/107,Temp: 97.7 °F (36.5 °C), Pulse: 75, Resp: 21, SpO2: 100 %       ED Course     Nursing Notes, Past Medical Hx, Past Surgical Hx, Social Hx,Allergies, and Family Hx were reviewed. patient was given the following medications:  No orders of the defined types were placed in this encounter. CONSULTS:  IP CONSULT TO PHARMACY  IP CONSULT TO NEPHROLOGY  IP CONSULT TO NEUROLOGY  IP CONSULT TO CASE MANAGEMENT    MEDICAL DECISIONMAKING / ASSESSMENT / Tad Wolf is a 68 y.o. female presenting with change in mental status and history of some right-sided weakness earlier in the day. For me here she is generally weak and will not cooperate with an exam.  She seems equally weak on both sides, both upper and lower extremities. She does not have a facial droop here but does have a lightly swollen and droopy right upper eyelid. Presenting symptoms do not appear to be consistent with stroke but more of a mental status change since the patient is not able to get herself up, is not cooperating with eating or doing anything on her own at this point in time. CT of the head was performed which was negative for acute stroke with her old stroke was seen on the CT.   She was not a code stroke given the duration of time since she was last seen well which was 20+ hours ago. Labs were unremarkable and urinalysis was negative. Chest x-ray was unremarkable. We made several attempts with 2 people to help get the patient up out of bed and she was unable to cooperate and was unable to get up out of bed which she normally would be able to do on her own. She does get around with a walker she cannot even get out of bed to do that at this point. She has not asking for any food although it has been sometime since she last ate. She still appears to be generally weak and is not focally weak at this time. Social work was in to speak with the caregiver and the patient and determined that it was unsafe for the patient to be at home since she is alone part of the day and she is in no condition currently to be at home. We were unable to get PT at this time to make an assessment but it would not matter because the patient's insurance requires a prior authorization for placement which we would not be able to get today as well. Family and caregiver did not feel comfortable with her going home at this point and at this point since there was concern earlier in the day for lateralizing symptoms, an MRI/MRA was ordered for the patient and the patient will be admitted for this further work-up. Clinical Impression     1. Altered mental status, unspecified altered mental status type    2.  General weakness        Disposition     DISPOSITION         Ernst King MD  05/14/21 9924

## 2021-05-14 NOTE — ED NOTES
Attempted to ambulate pt x2-pt tried to sit up but flopped back in bed; care giver states behavior is abnormal for pt     Sindy Mallory RN  05/14/21 8847

## 2021-05-14 NOTE — H&P
Hospital Medicine History & Physical      PCP: Robb Goltz    Date of Admission: 5/14/2021    Chief Complaint: Weakness    History Of Present Illness:  Patient is a 77-year-old female with past medical history of schizophrenia, diabetes mellitus, DVT status post IVC filter, dementia who presented to hospital due to altered mental status, generalized weakness. Patient is unable to provide reliable history due to multiple comorbid conditions, reportedly patient presented to the emergency department today as patient was noted to have change in mental status, generalized weakness. According to the patient's home care nurse patient was noted to have right eye droop and generalized weakness, patient is usually more alert and awake however patient was found to be weak and not communicating much. Patient has history of previous CVA in the past.  Patient was recently treated for urinary tract infection on last admission. No reported fevers chills chest pain shortness of breath.     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 ([address]     Other abnormal glucose 7/21/2011    PE (pulmonary embolism)     Personal history of schizophrenia 5/1/2008    Personal history of venous thrombosis and embolism 6/2/2008    S/P hysterectomy     Schizophrenia ([address]     Unspecified cerebral artery occlusion with cerebral infarction     Unspecified urinary incontinence 10/17/2012    Urinary retention     Venous thrombosis        Past Surgical History:          Procedure Laterality Date    [facility]Y  2011    [facility]    OTHER SURGICAL HISTORY  10/01/2013    EUS, EGD       Medications Prior to Admission:      Prior to Admission medications    Medication Sig Start Date End Date Taking? Authorizing Provider   SITagliptin (JANUVIA) 100 MG tablet Take 1 tablet by mouth daily 4/17/21   Rogelio Grijalva MD   torsemide (DEMADEX) 20 MG tablet Take 80 mg by mouth 2 times daily 6/5/20   Historical Provider, MD   atorvastatin (LIPITOR) 40 MG tablet Take 40 mg by mouth daily    Historical Provider, MD   acetaminophen (TYLENOL) 325 MG tablet Take 650 mg by mouth every 4 hours as needed for Pain    Historical Provider, MD   famotidine (PEPCID) 20 MG tablet Take 20 mg by mouth daily    Historical Provider, MD   glipiZIDE (GLUCOTROL) 5 MG tablet Take 5 mg by mouth 2 times daily (before meals)    Historical Provider, MD   metoprolol tartrate (LOPRESSOR) 25 MG tablet Take 25 mg by mouth 2 times daily    Historical Provider, MD   mineral oil-hydrophilic petrolatum (AQUAPHOR) ointment Apply topically 2 times daily Apply topically    Historical Provider, MD   atorvastatin (LIPITOR) 40 MG tablet Take 40 mg by mouth daily 6/5/20   Historical Provider, MD   risperiDONE (RISPERDAL) 2 MG tablet Take 2 mg by mouth nightly 6/5/20   Historical Provider, MD   aspirin 325 MG EC tablet Take 325 mg by mouth daily    Historical Provider, MD       Allergies:  Patient has no known allergies. Social History:      TOBACCO:   reports that she has never smoked. She has never used smokeless tobacco.  ETOH:   reports no history of alcohol use. Family History:       Reviewed in detail and non contributory      History reviewed. No pertinent family history. REVIEW OF SYSTEMS:   Pertinent positives as noted in the HPI. All other systems reviewed and negative.     PHYSICAL EXAM PERFORMED:    BP (!) 166/107   Pulse 75   Temp 97.7 °F (36.5 °C) (Oral)   Resp 21   Ht 5' 6\" (1.676 m)   Wt 257 lb (116.6 kg)   SpO2 100%   BMI 41.48 kg/m²     General appearance:  No apparent distress, cooperative. Seems confused  HEENT:  Normal cephalic, atraumatic without obvious deformity. Conjunctivae/corneas clear. Neck: Supple, with full range of motion. No cervical lymphadenopathy  Respiratory:  Normal respiratory effort. Clear to auscultation, bilaterally without Rales/Wheezes/Rhonchi. Cardiovascular:  Regular rate and rhythm with normal S1/S2 without murmurs, rubs or gallops. Abdomen: Soft, non-tender, non-distended, normal bowel sounds. Musculoskeletal:  No edema noted bilaterally. No tenderness on palpation   Skin: no rash visible  Neurologic: not following commands actively, moving all extrimities spontaneously  Psychiatric:  Alert and oriented x 1, normal mood  Peripheral Pulses: +2 palpable, equal bilaterally       Labs:     Recent Labs     05/14/21  1214   WBC 8.0   HGB 12.4   HCT 38.3        Recent Labs     05/14/21  1214      K 4.7      CO2 25   BUN 11   CREATININE 0.9   CALCIUM 11.2*     Recent Labs     05/14/21  1214   AST 20   ALT 18   BILITOT 0.5   ALKPHOS 128     No results for input(s): INR in the last 72 hours. No results for input(s): [place] in the last 72 hours. Urinalysis:      Lab Results   Component Value Date    NITRU Negative 05/14/2021    WBCUA 21-50 04/15/2021    BACTERIA 4+ 04/15/2021    RBCUA 3-4 04/15/2021    BLOODU Negative 05/14/2021    SPECGRAV 1.020 05/14/2021    GLUCOSEU Negative 05/14/2021       Radiology:       XR CHEST PORTABLE   Final Result      Elevated right hemidiaphragm. Clear lungs. Stable top normal heart size. CT HEAD WO CONTRAST   Final Result      Age-related changes and old infarct without acute intracranial abnormality.             MRI BRAIN WO CONTRAST    (Results Pending)   MRA HEAD WO CONTRAST    (Results Pending)   MRA NECK W CONTRAST    (Results Pending)           Active Hospital Problems    Diagnosis Date Noted    AMS (altered mental status) [R41.82] 05/14/2021 Patient is a 77-year-old female with past medical history of schizophrenia, diabetes mellitus, DVT status post IVC filter, dementia who presented to hospital due to altered mental status, generalized weakness. Patient is unable to provide reliable history due to multiple comorbid conditions, reportedly patient presented to the emergency department today as patient was noted to have change in mental status, generalized weakness. According to the patient's home care nurse patient was noted to have right eye droop and generalized weakness, patient is usually more alert and awake however patient was found to be weak and not communicating much. Patient has history of previous CVA in the past.  Patient was recently treated for urinary tract infection on last admission. No reported fevers chills chest pain shortness of breath. Assessment  Acute metabolic encephalopathy, unclear etiology at this time  Patient admitted from ED to rule out CVA  Generalized weakness  Diabetes mellitus  Schizophrenia  DVT status post IVC filter    Plan  MRI brain ordered from ED, CT head unremarkable for acute neurologic process, MRA head performed-pending result, chest x-ray negative for consolidation  UA-unremarkable  Consult PT/OT/speech therapy/neurology  Resume home aspirin, statin  Insulin sliding scale  Resume home medications after speech eval  We will also start gentle IV fluid therapy with normal saline  DVT prophylaxis-Lovenox  Diet: No diet orders on file  Code Status: Prior    PT/OT Eval Status: ordered    Dispo - pending clinical improvement       Vick Manzano MD    The note was completed using EMR and Dragon dictation system. Every effort was made to ensure accuracy; however, inadvertent computerized transcription errors may be present. Thank you Theiman Moreno for the opportunity to be involved in this patient's care. If you have any questions or concerns please feel free to contact me at [phone].     Ruthie Acuna MD Shin

## 2021-05-14 NOTE — CARE COORDINATION
Referred to patient for d/c planning. Spoke to patient and caregiver at bedside. Patient is a 68year old female admitted for AMS. Patient usually lives at home with disabled daughter. Patient has caregivers 8 hours a day. [place] [facility] for 4 hours a day and private duty 4 hours a day. Patient is usually independent with most ADLs. She is usually independent in ambulation with walker. Daughter Herlinda Ramsey, is POA, lives in [place], but will be here tomorrow.  to follow for d/c needs. Patient currently unable to ambulate. Case Management Assessment           Initial Evaluation                Date / Time of Evaluation: 5/14/2021 4:19 PM                 Assessment Completed by: Joel Jj    Patient Name: Israel Ramon     YOB: 1940  Diagnosis: AMS (altered mental status) [R41.82]     Date / Time: 5/14/2021 11:40 AM    Patient Admission Status: Inpatient    If patient is discharged prior to next notation, then this note serves as note for discharge by case management.      Current PCP: Sanjiv Bain Patient: No    Chart Reviewed: Yes  Patient/ Family Interviewed: Yes    Initial assessment completed at bedside with: patient and caregiver    Hospitalization in the last 30 days: No    Emergency Contacts:  Extended Emergency Contact Information  Primary Emergency Contact: [facility] [address] Phone: [phone]  Relation: Child  Secondary Emergency Contact: [address] [facility] Phone: [phone]  Relation: Child    Advance Directives:   Code Status: Prior    Healthcare Power of : Yes  Agent: [id number] Number:     Copy present: No     In paper Chart: No    Scanned into EMR Yes    Financial  Payor: Remy Moctezuma / Plan: [employer] PPO / Product Type: Medicare /     Pre-cert required for SNF: Yes    [address] [facility], [address] [facility]

## 2021-05-14 NOTE — ED TRIAGE NOTES
Patient reports to ED for possible stroke. Caregiver was helping patient shower when she came back and noticed her \"right eye was drooping\". Last known normal 10:50. Hx of strokes. FSBS on arrival 188.

## 2021-05-14 NOTE — PROGRESS NOTES
4 Eyes Admission Assessment     I agree as the admission nurse that 2 RN's have performed a thorough Head to Toe Skin Assessment on the patient. ALL assessment sites listed below have been assessed on admission. Areas assessed by both nurses:   [x]   Head, Face, and Ears   [x]   Shoulders, Back, and Chest  [x]   Arms, Elbows, and Hands   [x]   Coccyx, Sacrum, and Ischium  [x]   Legs, Feet, and Heels        Does the Patient have Skin Breakdown?   No - dry skin noted on bilat feet and elbows        Quan Prevention initiated:  No   Wound Care Orders initiated:  No      WOC nurse consulted for Pressure Injury (Stage 3,4, Unstageable, DTI, NWPT, and Complex wounds) or Quan score 18 or lower:  No      Nurse 1 eSignature: Electronically signed by Paradise Hdez RN on 5/14/21 at 6:20 PM EDT    **SHARE this note so that the co-signing nurse is able to place an eSignature**    Nurse 2 eSignature: Electronically signed by Hguh Nixon RN on 5/14/21 at 7:00 PM EDT

## 2021-05-14 NOTE — CONSULTS
Pharmacy Consult Note  - Admission Medication Reconciliation      Pharmacy consulted for reconciliation of [id number] medications. I reviewed Rx fill history via \"Complete Dispense Report\" in [email or web address], and spoke to patient's daughter and patient's caretaker on the phone. Pt's caretaker mentioned that pt was only administered her insulin today. All other meds were taken yesterday.     The following changes made to [id number] medication list:    ADDED:  1) insulin glargine   2) multivitamin     Dose or Frequency CHANGE:  1) aspirin 325 mg--> 81 mg daily     REMOVED:  1) acetaminophen 325 mg   2) glipizide 5 mg   3) Aquaphor     Thank you for the consult  Please call with questions:  [phone] ([address] [place] [address])    5/14/2021 6:58 PM  Aram Novak  PharmD Candidate   Class of 2023

## 2021-05-14 NOTE — ED NOTES
Bed: [facility]  Expected date:   Expected time:   Means of arrival:   Comments:  Reading     Shubham Church RN  05/14/21 2960

## 2021-05-15 PROBLEM — N39.0 UTI (URINARY TRACT INFECTION): Status: RESOLVED | Noted: 2021-04-15 | Resolved: 2021-05-15

## 2021-05-15 LAB
ALBUMIN SERPL-MCNC: 3.7 G/DL (ref 3.4–5)
ANION GAP SERPL CALCULATED.3IONS-SCNC: 10 MMOL/L (ref 3–16)
ANION GAP SERPL CALCULATED.3IONS-SCNC: 7 MMOL/L (ref 3–16)
BUN BLDV-MCNC: 10 MG/DL (ref 7–20)
BUN BLDV-MCNC: 10 MG/DL (ref 7–20)
CALCIUM SERPL-MCNC: 10.8 MG/DL (ref 8.3–10.6)
CALCIUM SERPL-MCNC: 10.9 MG/DL (ref 8.3–10.6)
CHLORIDE BLD-SCNC: 105 MMOL/L (ref 99–110)
CHLORIDE BLD-SCNC: 105 MMOL/L (ref 99–110)
CHOLESTEROL, TOTAL: 119 MG/DL (ref 0–199)
CO2: 23 MMOL/L (ref 21–32)
CO2: 25 MMOL/L (ref 21–32)
CREAT SERPL-MCNC: 0.8 MG/DL (ref 0.6–1.2)
CREAT SERPL-MCNC: 0.8 MG/DL (ref 0.6–1.2)
ESTIMATED AVERAGE GLUCOSE: 257.5 MG/DL
GFR AFRICAN AMERICAN: >60
GFR AFRICAN AMERICAN: >60
GFR NON-AFRICAN AMERICAN: >60
GFR NON-AFRICAN AMERICAN: >60
GLUCOSE BLD-MCNC: 201 MG/DL (ref 70–99)
GLUCOSE BLD-MCNC: 213 MG/DL (ref 70–99)
GLUCOSE BLD-MCNC: 254 MG/DL (ref 70–99)
GLUCOSE BLD-MCNC: 264 MG/DL (ref 70–99)
GLUCOSE BLD-MCNC: 283 MG/DL (ref 70–99)
HBA1C MFR BLD: 10.6 %
HCT VFR BLD CALC: 36.4 % (ref 36–48)
HDLC SERPL-MCNC: 57 MG/DL (ref 40–60)
HEMOGLOBIN: 11.9 G/DL (ref 12–16)
INR BLD: 0.98 (ref 0.86–1.14)
LDL CHOLESTEROL CALCULATED: 45 MG/DL
MCH RBC QN AUTO: 27.1 PG (ref 26–34)
MCHC RBC AUTO-ENTMCNC: 32.7 G/DL (ref 31–36)
MCV RBC AUTO: 82.9 FL (ref 80–100)
PARATHYROID HORMONE INTACT: 98.4 PG/ML (ref 14–72)
PDW BLD-RTO: 15.8 % (ref 12.4–15.4)
PERFORMED ON: ABNORMAL
PHOSPHORUS: 2.7 MG/DL (ref 2.5–4.9)
PLATELET # BLD: 241 K/UL (ref 135–450)
PMV BLD AUTO: 8.8 FL (ref 5–10.5)
POTASSIUM REFLEX MAGNESIUM: 4.5 MMOL/L (ref 3.5–5.1)
POTASSIUM SERPL-SCNC: 4.6 MMOL/L (ref 3.5–5.1)
PROTHROMBIN TIME: 11.4 SEC (ref 10–13.2)
RBC # BLD: 4.38 M/UL (ref 4–5.2)
SODIUM BLD-SCNC: 137 MMOL/L (ref 136–145)
SODIUM BLD-SCNC: 138 MMOL/L (ref 136–145)
TRIGL SERPL-MCNC: 87 MG/DL (ref 0–150)
VITAMIN D 25-HYDROXY: 40.8 NG/ML
VLDLC SERPL CALC-MCNC: 17 MG/DL
WBC # BLD: 7.9 K/UL (ref 4–11)

## 2021-05-15 PROCEDURE — 83036 HEMOGLOBIN GLYCOSYLATED A1C: CPT

## 2021-05-15 PROCEDURE — 36415 COLL VENOUS BLD VENIPUNCTURE: CPT

## 2021-05-15 PROCEDURE — 6360000002 HC RX W HCPCS: Performed by: INTERNAL MEDICINE

## 2021-05-15 PROCEDURE — 1200000000 HC SEMI PRIVATE

## 2021-05-15 PROCEDURE — 83970 ASSAY OF PARATHORMONE: CPT

## 2021-05-15 PROCEDURE — 80061 LIPID PANEL: CPT

## 2021-05-15 PROCEDURE — 92610 EVALUATE SWALLOWING FUNCTION: CPT

## 2021-05-15 PROCEDURE — 83883 ASSAY NEPHELOMETRY NOT SPEC: CPT

## 2021-05-15 PROCEDURE — 85027 COMPLETE CBC AUTOMATED: CPT

## 2021-05-15 PROCEDURE — 82306 VITAMIN D 25 HYDROXY: CPT

## 2021-05-15 PROCEDURE — 85610 PROTHROMBIN TIME: CPT

## 2021-05-15 PROCEDURE — 92526 ORAL FUNCTION THERAPY: CPT

## 2021-05-15 PROCEDURE — 80069 RENAL FUNCTION PANEL: CPT

## 2021-05-15 PROCEDURE — 6370000000 HC RX 637 (ALT 250 FOR IP): Performed by: INTERNAL MEDICINE

## 2021-05-15 PROCEDURE — 2580000003 HC RX 258: Performed by: INTERNAL MEDICINE

## 2021-05-15 RX ORDER — FAMOTIDINE 20 MG/1
20 TABLET, FILM COATED ORAL DAILY
Status: DISCONTINUED | OUTPATIENT
Start: 2021-05-15 | End: 2021-05-21 | Stop reason: HOSPADM

## 2021-05-15 RX ORDER — CLOPIDOGREL BISULFATE 75 MG/1
75 TABLET ORAL DAILY
Status: DISCONTINUED | OUTPATIENT
Start: 2021-05-16 | End: 2021-05-21 | Stop reason: HOSPADM

## 2021-05-15 RX ORDER — RISPERIDONE 1 MG/1
2 TABLET, FILM COATED ORAL NIGHTLY
Status: DISCONTINUED | OUTPATIENT
Start: 2021-05-15 | End: 2021-05-21 | Stop reason: HOSPADM

## 2021-05-15 RX ADMIN — METOPROLOL TARTRATE 25 MG: 25 TABLET, FILM COATED ORAL at 20:38

## 2021-05-15 RX ADMIN — INSULIN LISPRO 3 UNITS: 100 INJECTION, SOLUTION INTRAVENOUS; SUBCUTANEOUS at 20:39

## 2021-05-15 RX ADMIN — ATORVASTATIN CALCIUM 40 MG: 40 TABLET, FILM COATED ORAL at 20:38

## 2021-05-15 RX ADMIN — INSULIN LISPRO 6 UNITS: 100 INJECTION, SOLUTION INTRAVENOUS; SUBCUTANEOUS at 09:55

## 2021-05-15 RX ADMIN — FAMOTIDINE 20 MG: 20 TABLET, FILM COATED ORAL at 15:53

## 2021-05-15 RX ADMIN — ASPIRIN 81 MG: 81 TABLET, COATED ORAL at 09:55

## 2021-05-15 RX ADMIN — SODIUM CHLORIDE: 9 INJECTION, SOLUTION INTRAVENOUS at 10:06

## 2021-05-15 RX ADMIN — ENOXAPARIN SODIUM 40 MG: 40 INJECTION SUBCUTANEOUS at 09:55

## 2021-05-15 RX ADMIN — Medication 10 ML: at 20:46

## 2021-05-15 RX ADMIN — INSULIN LISPRO 4 UNITS: 100 INJECTION, SOLUTION INTRAVENOUS; SUBCUTANEOUS at 13:24

## 2021-05-15 RX ADMIN — INSULIN LISPRO 4 UNITS: 100 INJECTION, SOLUTION INTRAVENOUS; SUBCUTANEOUS at 18:41

## 2021-05-15 RX ADMIN — RISPERIDONE 2 MG: 1 TABLET ORAL at 20:38

## 2021-05-15 ASSESSMENT — PAIN SCALES - GENERAL
PAINLEVEL_OUTOF10: 0
PAINLEVEL_OUTOF10: 0

## 2021-05-15 NOTE — PROGRESS NOTES
Patient is alert, oriented only to self. Tolerating diet well, denies nausea/vomiting. IV fluids running per order. Voiding per purewick. VSS. No BM this shift.

## 2021-05-15 NOTE — PROGRESS NOTES
RN screened patients swallowing by administering the [facility] Protocol. The patient consumed 3 ounces of water by straw in sequential swallows without signs/symptoms of aspiration.

## 2021-05-15 NOTE — CONSULTS
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 ([facility]     Other abnormal glucose 7/21/2011    PE (pulmonary embolism)     Personal history of schizophrenia 5/1/2008    Personal history of venous thrombosis and embolism 6/2/2008    S/P hysterectomy     Schizophrenia ([facility]     Unspecified cerebral artery occlusion with cerebral infarction     Unspecified urinary incontinence 10/17/2012    Urinary retention     Venous thrombosis        Past Surgical History:   Procedure Laterality Date    HYSTERECTOMY  2011    [facility] for abnormal uterine bleeding    OTHER SURGICAL HISTORY  10/01/2013    EUS, EGD        reports that she has never smoked. She has never used smokeless tobacco. She reports that she does not drink alcohol and does not use drugs. family history is not on file.          Medication:     Current Facility-Administered Medications: glucose (GLUTOSE) 40 % oral gel 15 g, 15 g, Oral, PRN  dextrose 50 % IV solution, 12.5 g, Intravenous, PRN  glucagon (rDNA) injection 1 mg, 1 mg, Intramuscular, PRN  dextrose 5 % solution, 100 mL/hr, Intravenous, PRN  perflutren lipid microspheres (DEFINITY) injection 1.65 mg, 1.5 mL, Intravenous, ONCE PRN  sodium chloride flush 0.9 % injection 10 mL, 10 mL, Intravenous, 2 times per day  sodium chloride flush 0.9 % injection 10 mL, 10 mL, Intravenous, PRN  0.9 % sodium chloride infusion, 25 mL, Intravenous, PRN  promethazine (PHENERGAN) tablet 12.5 mg, 12.5 mg, Oral, Q6H PRN **OR** ondansetron (ZOFRAN) injection 4 mg, 4 mg, Intravenous, Q6H PRN  enoxaparin (LOVENOX) injection 40 mg, 40 mg, Subcutaneous, Daily  aspirin EC tablet 81 mg, 81 mg, Oral, Daily **OR** aspirin suppository 300 mg, 300 mg, Rectal, Daily  atorvastatin (LIPITOR) tablet 40 mg, 40 mg, Oral, Nightly  insulin lispro (1 Unit Dial) 0-12 Units, 0-12 Units, Subcutaneous, TID WC  insulin lispro (1 Unit Dial) 0-6 Units, 0-6 Units, Subcutaneous, Nightly  0.9 % sodium chloride infusion, , Intravenous, Continuous       Vitals :     Vitals:    05/15/21 0719   BP: (!) 154/85   Pulse: 60   Resp: 16   Temp: 97.5 °F (36.4 °C)   SpO2: 98%       I & O :       Intake/Output Summary (Last 24 hours) at 5/15/2021 0752  Last data filed at 5/14/2021 2021  Gross per 24 hour   Intake 720 ml   Output 100 ml   Net 620 ml        Physical Examination :     General appearance: Awake but confused. HEENT: Lips- normal, teeth- ok , oral mucosa- moist  Neck : Mass- no, appears symmetrical, JVD- not visible  Respiratory: Respiratory effort-okay, wheeze- no, crackles -none  Cardiovascular:  Ausculation- No M/R/G, Edema none  Abdomen: visible mass- no, distention- no, scar- no, tenderness- no                            hepatosplenomegaly-  no  Musculoskeletal:  generalized bilateral weakness  Skin: rashes- no , ulcers- no, induration- no, tightening - no  Psychiatric: Not evaluated     LABS:     Recent Labs     05/14/21  1214 05/15/21  0601   WBC 8.0 7.9   HGB 12.4 11.9*   HCT 38.3 36.4    241     Recent Labs     05/14/21  1214 05/15/21  0601    138   K 4.7 4.5    105   CO2 25 23   BUN 11 10   CREATININE 0.9 0.8   GLUCOSE 222* 283*      Nephrology  [address] [place] [address]  Office: [phone]  Fax: [phone]

## 2021-05-15 NOTE — VIRTUAL HEALTH
Consult to Neurology  Consult performed by: Krissy Espinoza MD  Consult ordered by: Tyrell Medina MD        Patient Location:  [address] [place] [address] [address] Ortho/Neuro          Reason for Consult: altered mental status, stroke  Attending Physician:  Tyrell Medina MD           HISTORY OF PRESENT ILLNESS:              The patient is a 68 y.o. female with significant past medical history of schizophrenia, DM2, dementia, previous stroke with residual left weakness, though walks with walker. She presented with altered mental status, right eye droop, generalized weakness. Caregiver that spends several hours a day with her found her changed on the day of admission. Recent admissions for fall and UTI. MRI in ER showed acute infarct in the left frontoparietal area. MRA of head and neck did not show any large vessel occlusions or intracranial stenoses. She has significant white matter changes and atrophy. Chronic stroke in right frontal lobe. She was on 325mg aspirin before admission.     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 ([address]     Other abnormal glucose 7/21/2011    PE (pulmonary embolism)     Personal history of schizophrenia 5/1/2008    Personal history of venous thrombosis and embolism 6/2/2008    S/P hysterectomy     Schizophrenia ([address]     Unspecified cerebral artery occlusion with cerebral infarction     Unspecified urinary incontinence 10/17/2012    Urinary retention     Venous thrombosis        Past Surgical History:    Past Surgical History:   Procedure Laterality Date    [facility]ST[facility]CTOMY 2011    [facility] for abnormal uterine bleeding    OTHER SURGICAL HISTORY  10/01/2013    EUS, EGD       Medications:   Current Facility-Administered Medications: glucose (GLUTOSE) 40 % oral gel 15 g, 15 g, Oral, PRN  dextrose 50 % IV solution, 12.5 g, Intravenous, PRN  glucagon (rDNA) injection 1 mg, 1 mg, Intramuscular, PRN  dextrose 5 % solution, 100 mL/hr, Intravenous, PRN  perflutren lipid microspheres (DEFINITY) injection 1.65 mg, 1.5 mL, Intravenous, ONCE PRN  sodium chloride flush 0.9 % injection 10 mL, 10 mL, Intravenous, 2 times per day  sodium chloride flush 0.9 % injection 10 mL, 10 mL, Intravenous, PRN  0.9 % sodium chloride infusion, 25 mL, Intravenous, PRN  promethazine (PHENERGAN) tablet 12.5 mg, 12.5 mg, Oral, Q6H PRN **OR** ondansetron (ZOFRAN) injection 4 mg, 4 mg, Intravenous, Q6H PRN  enoxaparin (LOVENOX) injection 40 mg, 40 mg, Subcutaneous, Daily  aspirin EC tablet 81 mg, 81 mg, Oral, Daily **OR** aspirin suppository 300 mg, 300 mg, Rectal, Daily  atorvastatin (LIPITOR) tablet 40 mg, 40 mg, Oral, Nightly  insulin lispro (1 Unit Dial) 0-12 Units, 0-12 Units, Subcutaneous, TID WC  insulin lispro (1 Unit Dial) 0-6 Units, 0-6 Units, Subcutaneous, Nightly  0.9 % sodium chloride infusion, , Intravenous, Continuous   Medications Prior to Admission: Multiple Vitamin (MULTIVITAMIN ADULT PO), Take by mouth  insulin glargine (LANTUS) 100 UNIT/ML injection vial, Inject 20 Units into the skin every morning  SITagliptin (JANUVIA) 100 MG tablet, Take 1 tablet by mouth daily  torsemide (DEMADEX) 20 MG tablet, Take 80 mg by mouth 2 times daily  famotidine (PEPCID) 20 MG tablet, Take 20 mg by mouth daily  metoprolol tartrate (LOPRESSOR) 25 MG tablet, Take 25 mg by mouth 2 times daily  atorvastatin (LIPITOR) 40 MG tablet, Take 40 mg by mouth daily  risperiDONE (RISPERDAL) 2 MG tablet, Take 2 mg by mouth nightly  aspirin 81 MG EC tablet, Take 325 mg by mouth daily     Allergies:  Patient has no known Cough  Gastrointestinal- No Abdominal pain. No Vomiting. Genitourinary- No incontinence. No urinary retention  Musculoskeletal- No myalgia. No arthralgia  Skin- No rash. No easy bruising. Psychiatric- No depression. No anxiety  Endocrine- No diabetes. No thyroid issues. Hematologic- No bleeding difficulty. No fatigue  Neurologic- No weakness. No Headache. Exam:  Blood pressure 139/78, pulse 71, temperature 98.2 °F (36.8 °C), temperature source Oral, resp. rate 16, height 5' 6\" (1.676 m), weight 257 lb (116.6 kg), SpO2 98 %, not currently breastfeeding. Constitutional    Vital signs: BP, HR, and RR reviewed   General Somnolent but arousable. No distress, well-nourished  Cardiovascular: pulses symmetric in all 4 extremities. No peripheral edema. Psychiatric: cooperative with examination, no  psychotic behavior noted. Neurologic  Mental status:   orientation to person and place, could not say date   Attention intact as able to attend well to the exam     Language fluent in conversation   Comprehension intact; follows simple commands  Cranial nerves:   CN2: Visual Fields full w/o extinction on confrontational testing,   CN 3,4,6: extraocular muscles intact,  CN5: facial sensation symmetric   CN7:face symmetric without dysarthria,   CN8: hearing grossly intact  CN9: palate elevated symmetrically  CN11: trap full strength on shoulder shrug  CN12: tongue midline with protrusion  Strength: Strength 5/5 right upper and lower, 3/5 left upper and lower. Sensory: light touch intact in all 4 extremities.   No sensory extinction on double simultaneous stimulation  Tone: normal in all 4 extremities  Gait: Deferred        Studies:  CT Head: Results for orders placed during the hospital encounter of 05/14/21    CT HEAD WO CONTRAST    Narrative  CT head without contrast    HISTORY: Acute mental status change  COMPARISON: 4/15/2021  CONTRAST:  none  TECHNIQUE: Individualized dose optimization technique was used in order

## 2021-05-15 NOTE — PLAN OF CARE
Problem: Nutrition  Intervention: Swallowing evaluation  SLP completed evaluation. Please refer to notes in EMR.     Edward Magdaleno M.A., [address]  Speech-Language Pathologist

## 2021-05-15 NOTE — PROGRESS NOTES
Speech Language Pathology  Facility/Department: [facility] 5T ORTHO/NEURO   CLINICAL BEDSIDE SWALLOW EVALUATION & Treatment Note    NAME: Michelle Goff  : 1940  MRN: [id number]    ADMISSION DATE: 2021  ADMITTING DIAGNOSIS: has Essential hypertension; Cerebral infarction ([address] Hemiparesis, left ([address] GI bleed; Hypovolemia; Iron deficiency; Acute posthemorrhagic anemia; Chest pain; Hyperlipidemia; DMII (diabetes mellitus, type 2) ([address] Morbid obesity due to excess calories ([facility] UTI (urinary tract infection); and AMS (altered mental status) on their problem list.  ONSET DATE: 2021    Recent Chest Xray: (2021)     Impression       Elevated right hemidiaphragm.       Clear lungs.       Stable top normal heart size. Recent MRI Brain: (2021)     Impression       1.  Small acute bland infarct at the left frontoparietal junction. 2.  Chronic right frontal lobe infarct. 3.  Moderate chronic small vessel ischemic disease. 4.  No aneurysms, vascular occlusions, or intracranial stenoses identified. 5.  No significant stenosis in the extracranial vertebral or carotid arteries. Date of Eval: 5/15/2021  Evaluating Therapist: Stewart Spears    Current Diet level:  Current Diet : NPO  Current Liquid Diet : NPO      Primary Complaint  Patient Complaint: Patient did not state. Pain:  Pain Assessment  Pain Assessment: 0-10  Pain Level: 0    Reason for Referral  Michelle Goff was referred for a bedside swallow evaluation to assess the efficiency of her swallow function, identify signs and symptoms of aspiration and make recommendations regarding safe dietary consistencies, effective compensatory strategies, and safe eating environment. Impression  Dysphagia Diagnosis: Suspected needs further assessment  Dysphagia Impression : Suspect possible pharyngeal dysfunction; needs further assessment. Unable to complete oral motor exam d/t limited command following. Assessed tolerance ice chips, thins via tsp/cup, nectar thick liquid via cup, puree, soft solid. Pt with positive oral acceptance, mildly prolonged oral prep, positive swallow movement/onset, adequate oral clearance. Single cough noted with thins via cup (attempted to complete 3 oz screen, but pt unable to follow directions). No overt s/s aspiration or penetration noted for soilds or nectar thick liquids. Suspect lethargy may be impacting performance. Given h/o aspiration, suggest previously recommended diet of dental soft solids, nectar thick liquids, no straw. Will continue to follow. Dysphagia Outcome Severity Scale: Level 5: Mild dysphagia- Distant supervision. May need one diet consistency restricted     Treatment Plan  Requires SLP Intervention: Yes  Duration/Frequency of Treatment: 3-5x/wk for LOS  D/C Recommendations: To be determined       Recommended Diet and Intervention  Diet Solids Recommendation: Dental Soft  Liquid Consistency Recommendation: Mildly Thick (Nectar)  Recommended Form of Meds: PO  Recommendations: Modified barium swallow study; Dysphagia treatment  Therapeutic Interventions: Diet tolerance monitoring;Oral care; Patient/Family education    Compensatory Swallowing Strategies  Compensatory Swallowing Strategies: No straws;Eat/Feed slowly; Alternate solids and liquids;Upright as possible for all oral intake;Small bites/sips;Assist feed    Treatment/Goals  Short-term Goals  Timeframe for Short-term Goals: 1-2 wks or LOS  Goal 1: Patient will tolerate least restrictive diet without overt signs of aspiration. Goal 2: Patient/caregiver will demonstrate understanding of swallowing concerns/recommendations.   5/15: Provided education to patient regarding swallow function, dysphagia, aspiration concerns, previous MBS results, diet recommendations, importance of upright position, avoidance of straws, recommendation for repeat instrumental. Suspect limited comprehension, needing further reinforcement. Cont goal.  Goal 3: Patient will participate in repeat instrumental swallowing procedure as appropriate. General  Chart Reviewed: Yes  Comments: Per admitting H&P (05/14/2021): 'Patient is a 72-year-old female with past medical history of schizophrenia, diabetes mellitus, DVT status post IVC filter, dementia who presented to hospital due to altered mental status, generalized weakness. Patient is unable to provide reliable history due to multiple comorbid conditions, reportedly patient presented to the emergency department today as patient was noted to have change in mental status, generalized weakness. According to the patient's home care nurse patient was noted to have right eye droop and generalized weakness, patient is usually more alert and awake however patient was found to be weak and not communicating much. Patient has history of previous CVA in the past.  Patient was recently treated for urinary tract infection on last admission. No reported fevers chills chest pain shortness of breath.'  Subjective  Subjective: Patient awake and pleasant, resting in bed, on room air. Somewhat lethargic, growing more alert as visit progressed. Poor historian. Inconsistent command following. Behavior/Cognition: Alert; Cooperative;Confused; Lethargic;Requires cueing  Respiratory Status: Room air  O2 Device: None (Room air)  Communication Observation: Functional  Follows Directions: Simple (max cues)  Dentition: Some missing teeth  Patient Positioning: Upright in bed  Baseline Vocal Quality: Hoarse  Volitional Cough: Strong  Prior Dysphagia History: Patient seen by this speech therapy department in 2013, with a MBS completed at that time identifying aspiration of thin liquids via straw. Subsequent diet recommendations at that time were for dental soft solids and nectar thick liquids. Consistencies Administered: Ice Chips; Nectar - cup; Thin - cup;Dysphagia Pureed (Dysphagia I); Dysphagia Soft and Bite-Sized (Dysphagia III)    Vision/Hearing  Vision  Vision:  (unable to assess)  Hearing  Hearing: Exceptions to [facility] (unable to assess)    Oral Motor Deficits  Oral/Motor  Oral Motor:  (unable to complete oral motor exam d/t limited command following)    Prognosis  Prognosis  Prognosis for safe diet advancement: good  Individuals consulted  Consulted and agree with results and recommendations: Patient    Education  Patient Education: Educated pt re: purpose of visit, role of SLP, recommendations. Patient Education Response: Needs reinforcement  Safety Devices in place: Yes  Type of devices: All fall risk precautions in place; Left in bed;Bed alarm in place;Call light within reach       Therapy Time  SLP Individual Minutes  Time In: 0830  Time Out: 0903  Minutes: 35     SLP Total Treatment Time  Timed Code Treatment Minutes: 0 Minutes  Total Treatment Time: 33    Plan  Diet Recommendations: Dental soft solids / nectar thick liquids, no straws / meds in puree; as tolerated. If signs of aspiration or associated decline in respiratory status arise, recommend withhold PO and contact speech. Discharge Plan:  TBD  Discussed with RN, Gemma Parmar. Needs within reach. Electronically Signed by:  Priyank Mccann M.A., [address]  Speech-Language Pathologist  Pager #[phone]    This document will serve as a discharge summary if pt discharges before next treatment.

## 2021-05-15 NOTE — PROGRESS NOTES
1400 ml       Physical Exam Performed:    /78   Pulse 71   Temp 98.2 °F (36.8 °C) (Oral)   Resp 16   Ht 5' 6\" (1.676 m)   Wt 257 lb (116.6 kg)   SpO2 98%   BMI 41.48 kg/m²     General appearance: No apparent distress  HEENT:  Conjunctivae/corneas clear. Neck: Supple, with full range of motion. Respiratory:  Normal respiratory effort. Clear to auscultation, bilaterally without Rales/Wheezes/Rhonchi. Cardiovascular: Regular rate and rhythm with normal S1/S2 without murmurs or rubs  Abdomen: Soft, non-tender, non-distended, normal bowel sounds. Musculoskeletal: No cyanosis or edema bilaterally  Neurologic:  without any focal sensory/motor deficits. grossly non-focal.  Psychiatric: Alert and oriented x 2, Normal mood  Peripheral Pulses: +2 palpable, equal bilaterally       Labs:   Recent Labs     05/14/21  1214 05/15/21  0601   WBC 8.0 7.9   HGB 12.4 11.9*   HCT 38.3 36.4    241     Recent Labs     05/14/21  1214 05/15/21  0601 05/15/21  0822    138 137   K 4.7 4.5 4.6    105 105   CO2 25 23 25   BUN 11 10 10   CREATININE 0.9 0.8 0.8   CALCIUM 11.2* 10.8* 10.9*   PHOS  --   --  2.7     Recent Labs     05/14/21  1214   AST 20   ALT 18   BILITOT 0.5   ALKPHOS 128     Recent Labs     05/15/21  0601   INR 0.98     No results for input(s): CKTOTAL, TROPONINI in the last 72 hours. Urinalysis:      Lab Results   Component Value Date    NITRU Negative 05/14/2021    WBCUA 21-50 04/15/2021    BACTERIA 4+ 04/15/2021    RBCUA 3-4 04/15/2021    BLOODU Negative 05/14/2021    SPECGRAV 1.020 05/14/2021    GLUCOSEU Negative 05/14/2021       Radiology:  MRA NECK W WO CONTRAST   Final Result      1. Small acute bland infarct at the left frontoparietal junction. 2.  Chronic right frontal lobe infarct. 3.  Moderate chronic small vessel ischemic disease. 4.  No aneurysms, vascular occlusions, or intracranial stenoses identified.    5.  No significant stenosis in the extracranial vertebral or carotid arteries. MRA HEAD WO CONTRAST   Final Result      1. Small acute bland infarct at the left frontoparietal junction. 2.  Chronic right frontal lobe infarct. 3.  Moderate chronic small vessel ischemic disease. 4.  No aneurysms, vascular occlusions, or intracranial stenoses identified. 5.  No significant stenosis in the extracranial vertebral or carotid arteries. MRI BRAIN WO CONTRAST   Final Result      1. Small acute bland infarct at the left frontoparietal junction. 2.  Chronic right frontal lobe infarct. 3.  Moderate chronic small vessel ischemic disease. 4.  No aneurysms, vascular occlusions, or intracranial stenoses identified. 5.  No significant stenosis in the extracranial vertebral or carotid arteries. XR CHEST PORTABLE   Final Result      Elevated right hemidiaphragm. Clear lungs. Stable top normal heart size. CT HEAD WO CONTRAST   Final Result      Age-related changes and old infarct without acute intracranial abnormality. Assessment/Plan:    Active Hospital Problems    Diagnosis     AMS (altered mental status) [R41.82]          Patient is a 82-year-old female with past medical history of schizophrenia, diabetes mellitus, DVT status post IVC filter, dementia who presented to hospital due to altered mental status, generalized weakness. Patient is unable to provide reliable history due to multiple comorbid conditions, reportedly patient presented to the emergency department today as patient was noted to have change in mental status, generalized weakness. According to the patient's home care nurse patient was noted to have right eye droop and generalized weakness, patient is usually more alert and awake however patient was found to be weak and not communicating much. Patient has history of previous CVA in the past.  Patient was recently treated for urinary tract infection on last admission.   No reported fevers chills chest pain shortness of breath.     Assessment  Acute metabolic encephalopathy, unclear etiology at this time  Patient admitted from ED to rule out CVA  Generalized weakness  Diabetes mellitus  Schizophrenia  DVT status post IVC filter     Plan  MRI brain ordered from ED - shows acute cva,echo pending, CT head unremarkable for acute neurologic process, MRA head performed-shows CVA, chest x-ray negative for consolidation  UA-unremarkable  Consult PT/OT/speech therapy/neurology  Resume home aspirin, statin  Insulin sliding scale  home medications after speech eval  Changed asa to plavix  gentle IV fluid therapy with normal saline  DVT prophylaxis-Lovenox  Diet: No diet orders on file  Code Status: Prior     PT/OT Eval Status: ordered     Dispo - pending clinical improvement  Diet: DIET CARB CONTROL; Carb Control: 3 carb choices (45 gms)/meal; Low Sodium (2 GM);  Dental Soft; Mildly Thick (Nectar)  Code Status: Full Code    PT/OT Eval Status: ordered    Dispo - likely dc on Ayla Dave MD

## 2021-05-16 ENCOUNTER — APPOINTMENT (OUTPATIENT)
Dept: CT IMAGING | Age: 81
DRG: 064 | End: 2021-05-16
Payer: MEDICARE

## 2021-05-16 LAB
ALBUMIN SERPL-MCNC: 3.5 G/DL (ref 3.4–5)
ANION GAP SERPL CALCULATED.3IONS-SCNC: 9 MMOL/L (ref 3–16)
BUN BLDV-MCNC: 9 MG/DL (ref 7–20)
CALCIUM SERPL-MCNC: 10.6 MG/DL (ref 8.3–10.6)
CHLORIDE BLD-SCNC: 104 MMOL/L (ref 99–110)
CO2: 23 MMOL/L (ref 21–32)
CREAT SERPL-MCNC: 0.8 MG/DL (ref 0.6–1.2)
ESTIMATED AVERAGE GLUCOSE: 266.1 MG/DL
GFR AFRICAN AMERICAN: >60
GFR NON-AFRICAN AMERICAN: >60
GLUCOSE BLD-MCNC: 177 MG/DL (ref 70–99)
GLUCOSE BLD-MCNC: 231 MG/DL (ref 70–99)
GLUCOSE BLD-MCNC: 237 MG/DL (ref 70–99)
GLUCOSE BLD-MCNC: 269 MG/DL (ref 70–99)
GLUCOSE BLD-MCNC: 270 MG/DL (ref 70–99)
HBA1C MFR BLD: 10.9 %
PERFORMED ON: ABNORMAL
PHOSPHORUS: 2.9 MG/DL (ref 2.5–4.9)
POTASSIUM SERPL-SCNC: 4.1 MMOL/L (ref 3.5–5.1)
SODIUM BLD-SCNC: 136 MMOL/L (ref 136–145)

## 2021-05-16 PROCEDURE — 80069 RENAL FUNCTION PANEL: CPT

## 2021-05-16 PROCEDURE — 97166 OT EVAL MOD COMPLEX 45 MIN: CPT

## 2021-05-16 PROCEDURE — 97530 THERAPEUTIC ACTIVITIES: CPT

## 2021-05-16 PROCEDURE — 6370000000 HC RX 637 (ALT 250 FOR IP): Performed by: PSYCHIATRY & NEUROLOGY

## 2021-05-16 PROCEDURE — 97162 PT EVAL MOD COMPLEX 30 MIN: CPT

## 2021-05-16 PROCEDURE — 6360000002 HC RX W HCPCS: Performed by: INTERNAL MEDICINE

## 2021-05-16 PROCEDURE — 70450 CT HEAD/BRAIN W/O DYE: CPT

## 2021-05-16 PROCEDURE — 97535 SELF CARE MNGMENT TRAINING: CPT

## 2021-05-16 PROCEDURE — 2580000003 HC RX 258: Performed by: INTERNAL MEDICINE

## 2021-05-16 PROCEDURE — 1200000000 HC SEMI PRIVATE

## 2021-05-16 PROCEDURE — 97116 GAIT TRAINING THERAPY: CPT

## 2021-05-16 PROCEDURE — 6370000000 HC RX 637 (ALT 250 FOR IP): Performed by: INTERNAL MEDICINE

## 2021-05-16 PROCEDURE — 36415 COLL VENOUS BLD VENIPUNCTURE: CPT

## 2021-05-16 RX ORDER — TORSEMIDE 20 MG/1
40 TABLET ORAL DAILY
Status: DISCONTINUED | OUTPATIENT
Start: 2021-05-16 | End: 2021-05-21 | Stop reason: HOSPADM

## 2021-05-16 RX ORDER — LOSARTAN POTASSIUM 25 MG/1
50 TABLET ORAL DAILY
Status: DISCONTINUED | OUTPATIENT
Start: 2021-05-16 | End: 2021-05-21 | Stop reason: HOSPADM

## 2021-05-16 RX ORDER — PROCHLORPERAZINE EDISYLATE 5 MG/ML
6.5 INJECTION INTRAMUSCULAR; INTRAVENOUS ONCE
Status: DISCONTINUED | OUTPATIENT
Start: 2021-05-16 | End: 2021-05-16

## 2021-05-16 RX ORDER — TORSEMIDE 20 MG/1
40 TABLET ORAL 2 TIMES DAILY
Status: DISCONTINUED | OUTPATIENT
Start: 2021-05-16 | End: 2021-05-16

## 2021-05-16 RX ADMIN — METOPROLOL TARTRATE 25 MG: 25 TABLET, FILM COATED ORAL at 08:28

## 2021-05-16 RX ADMIN — LOSARTAN POTASSIUM 50 MG: 25 TABLET, FILM COATED ORAL at 08:28

## 2021-05-16 RX ADMIN — INSULIN LISPRO 2 UNITS: 100 INJECTION, SOLUTION INTRAVENOUS; SUBCUTANEOUS at 08:28

## 2021-05-16 RX ADMIN — RISPERIDONE 2 MG: 1 TABLET ORAL at 21:14

## 2021-05-16 RX ADMIN — INSULIN LISPRO 3 UNITS: 100 INJECTION, SOLUTION INTRAVENOUS; SUBCUTANEOUS at 21:13

## 2021-05-16 RX ADMIN — INSULIN LISPRO 4 UNITS: 100 INJECTION, SOLUTION INTRAVENOUS; SUBCUTANEOUS at 12:06

## 2021-05-16 RX ADMIN — TORSEMIDE 40 MG: 20 TABLET ORAL at 11:51

## 2021-05-16 RX ADMIN — FAMOTIDINE 20 MG: 20 TABLET, FILM COATED ORAL at 08:28

## 2021-05-16 RX ADMIN — ENOXAPARIN SODIUM 40 MG: 40 INJECTION SUBCUTANEOUS at 08:28

## 2021-05-16 RX ADMIN — CLOPIDOGREL BISULFATE 75 MG: 75 TABLET ORAL at 08:28

## 2021-05-16 RX ADMIN — ATORVASTATIN CALCIUM 40 MG: 40 TABLET, FILM COATED ORAL at 21:14

## 2021-05-16 RX ADMIN — METOPROLOL TARTRATE 25 MG: 25 TABLET, FILM COATED ORAL at 21:14

## 2021-05-16 RX ADMIN — Medication 10 ML: at 20:49

## 2021-05-16 RX ADMIN — INSULIN LISPRO 6 UNITS: 100 INJECTION, SOLUTION INTRAVENOUS; SUBCUTANEOUS at 17:34

## 2021-05-16 ASSESSMENT — PAIN SCALES - GENERAL: PAINLEVEL_OUTOF10: 0

## 2021-05-16 NOTE — PROGRESS NOTES
Flora Florian MD    famotidine (PEPCID) tablet 20 mg, 20 mg, Oral, Daily, Jeremiah Mcgarry MD, 20 mg at 05/16/21 0828    metoprolol tartrate (LOPRESSOR) tablet 25 mg, 25 mg, Oral, BID, Jeremiah Mcgarry MD, 25 mg at 05/16/21 1009    risperiDONE (RISPERDAL) tablet 2 mg, 2 mg, Oral, Nightly, Jeremiah Mcgarry MD, 2 mg at 05/15/21 2038    glucose (GLUTOSE) 40 % oral gel 15 g, 15 g, Oral, PRN, Jeremiah Mcgarry MD    dextrose 50 % IV solution, 12.5 g, Intravenous, PRN, Jeremiah Mcgarry MD    glucagon (rDNA) injection 1 mg, 1 mg, Intramuscular, PRN, Jeremiah Mcgarry MD    dextrose 5 % solution, 100 mL/hr, Intravenous, PRN, Jeremiah Mcgarry MD    sodium chloride flush 0.9 % injection 10 mL, 10 mL, Intravenous, 2 times per day, Jeremiah Mcgarry MD, 10 mL at 05/15/21 2046    sodium chloride flush 0.9 % injection 10 mL, 10 mL, Intravenous, PRN, Jeremiah Mcgarry MD    0.9 % sodium chloride infusion, 25 mL, Intravenous, PRN, Jeremiah Mcgarry MD    promethazine (PHENERGAN) tablet 12.5 mg, 12.5 mg, Oral, Q6H PRN **OR** ondansetron (ZOFRAN) injection 4 mg, 4 mg, Intravenous, Q6H PRN, Jeremiah Mcgarry MD    enoxaparin (LOVENOX) injection 40 mg, 40 mg, Subcutaneous, Daily, Jeremiah Mcgarry MD, 40 mg at 05/16/21 0828    atorvastatin (LIPITOR) tablet 40 mg, 40 mg, Oral, Nightly, Jeremiah Mcgarry MD, 40 mg at 05/15/21 2038    insulin lispro (1 Unit Dial) 0-12 Units, 0-12 Units, Subcutaneous, TID WC, Jeremiah Mcgarry MD, 4 Units at 05/16/21 1206    insulin lispro (1 Unit Dial) 0-6 Units, 0-6 Units, Subcutaneous, Nightly, Jeremiah Mcgarry MD, 3 Units at 05/15/21 2039    ------------------------------------------    Exam:    Vitals:  /77   Pulse 65   Temp 97.8 °F (36.6 °C) (Oral)   Resp 16   Ht 5' 6\" (1.676 m)   Wt 257 lb (116.6 kg)   SpO2 99%   BMI 41.48 kg/m²     Awake, alert, oriented x3. Sitting in chair. Much brighter than yesterday. Swallowing thickened liquids well. Mild dysarthria. PERRL. EOMI. Moves all 4 extremities.  Has chronic left sided

## 2021-05-16 NOTE — PROGRESS NOTES
Occupational Therapy   Occupational Therapy Initial Assessment/ Tx Note  Date: 2021   Patient Name: Niranjan Lynch  MRN: [id number]     : 1940    Date of Service: 2021    Discharge Recommendations: Niranjan Lynch scored a 13/24 on the AM-PAC ADL Inpatient form. Current research shows that an AM-PAC score of 17 or less is typically not associated with a discharge to the patient's home setting. Based on the patient's AM-PAC score and their current ADL deficits, it is recommended that the patient have 3-5 sessions per week of Occupational Therapy at d/c to increase the patient's independence. Please see assessment section for further patient specific details. If patient discharges prior to next session this note will serve as a discharge summary. Please see below for the latest assessment towards goals. OT Equipment Recommendations  Equipment Needed: No    Assessment   Performance deficits / Impairments: Decreased functional mobility ; Decreased ADL status; Decreased strength;Decreased endurance;Decreased high-level IADLs  Assessment: Patient presenting from home where she receives a variety of services from [address] [place] [address] throughout the day. Patient requires increased time for all functional tasks and communications. Requiring up to [address] x1 and repeated directions/educations throughout regarding sequencing and safety during ADL tasks/transfers. Patient would benefit from continued skilled therapy at d/c prior to d/c home. Will follow while ip to promote strength, endurance, independence and safety.   Treatment Diagnosis: Decreased ADL tasks, transfers, safety and endurance  Prognosis: Fair  Decision Making: Medium Complexity  OT Education: OT Role;Plan of Care;ADL Adaptive Strategies;Transfer Training  Patient Education: Needs re-teaching  Barriers to Learning: Cognition  REQUIRES OT FOLLOW UP: Yes  Activity Tolerance  Activity Tolerance: Patient Tolerated treatment well;Patient limited by fatigue  Safety Devices  Safety Devices in place: Yes  Type of devices: Left in chair;Nurse notified; Chair alarm in place;Call light within reach           Patient Diagnosis(es): The primary encounter diagnosis was Altered mental status, unspecified altered mental status type. A diagnosis of General weakness was also pertinent to this visit. 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 has a past surgical history that includes Hysterectomy (2011) and other surgical history (10/01/2013). Treatment Diagnosis: Decreased ADL tasks, transfers, safety and endurance      Restrictions  Position Activity Restriction  Other position/activity restrictions: up as rio prn    Subjective   General  Chart Reviewed: Yes  Patient assessed for rehabilitation services?: Yes  Additional Pertinent Hx: 68 y.o. female who presented with small left subcortical stroke in the setting of dementia and previous right sided stroke and significant ischemic white matter change; MRI:1.  Small acute bland infarct at the left frontoparietal junction. 2. Chronic right frontal lobe infarct. 3. Moderate chronic small vessel ischemic disease.   Family / Caregiver Present: No  Referring Practitioner: Shin  Diagnosis: AMS  Subjective  Subjective: Patient in bed finishing breakfast upon arrival. Agreeable to therapy services at this Setup;Verbal cueing;Minimal assistance; Increased time to complete  Grooming: Increased time to complete;Minimal assistance;Verbal cueing (hand hygiene in stance at sink, Face washing in supported sitting)  LE Dressing: Maximum assistance;Verbal cueing (Donning B socks, [place]/donning brief)  Toileting: Maximum assistance (for pericare)  Additional Comments: Using Rw throughout, Patient with decreased processing speed so requires increased time througout all ADL tasks, transfers, and communication  Tone RUE  RUE Tone: Normotonic  Tone LUE  LUE Tone: Normotonic  Coordination  Movements Are Fluid And Coordinated: Yes     Bed mobility  Supine to Sit: Stand by assistance  Transfers  Sit to stand: Minimal assistance  Stand to sit: Minimal assistance  Transfer Comments: RW, Increased time, VC throughout     Cognition  Overall Cognitive Status: Exceptions  Arousal/Alertness: Delayed responses to stimuli  Following Commands:  Follows one step commands with repetition  Attention Span: Attends with cues to redirect  Memory: Decreased recall of biographical Information  Sequencing: Requires cues for some  Cognition Comment: Decreased processing speed, And questionable historian        Sensation  Overall Sensation Status:  (Denied numbness/tingling)           LUE Strength  Gross LUE Strength: WFL  RUE Strength  Gross RUE Strength: WFL                   Plan   Plan  Times per week: 5-7x  Times per day: Daily      AM-PAC Score        AM-[facility] Inpatient Daily Activity Raw Score: 13 (05/16/21 1033)  AM-PAC Inpatient ADL T-Scale Score : 32.03 (05/16/21 1033)  ADL Inpatient CMS 0-100% Score: 63.03 (05/16/21 1033)  ADL Inpatient CMS G-Code Modifier : CL (05/16/21 1033)    Goals  Short term goals  Time Frame for Short term goals: discharge  Short term goal 1: Complete toileting transfer with CGA  Short term goal 2: Complete ADL in stance x10 minutes with CGA  Short term goal 3: Completeing grooming tasks with setup  Patient Goals

## 2021-05-16 NOTE — PROGRESS NOTES
A/Ox2 to self and that she is at a hospital. NIH 1. tolerates diet. Voids vis pure wick while in bed. NSR on tele. No BM overnight. VSS with mildly elevated BP overnight within parameters. Bed alarm set. Call light in reach. Will continue to monitor. avasys for safety.

## 2021-05-16 NOTE — PROGRESS NOTES
Patient is alert and oriented, disoriented to situation. Tolerating diet well, denies nausea/vomiting. VSS. Up to chair, x1 assist with walker. Had a BM this shift.

## 2021-05-16 NOTE — PLAN OF CARE
Problem: Pain:  Goal: Patient's pain/discomfort is manageable  Description: Patient's pain/discomfort is manageable. Patient resting comfortably. 5/16/2021 0749 by Eugene Aburto RN  Outcome: Ongoing       Problem: Falls - Risk of:  Goal: Will remain free from falls  Description: Fall precautions in place. Bed is in lowest position, wheels locked, alarm on, non-skid socks on. Call light and bedside table within reach. Patient calls out appropriately. Patient is up x2 assist with stedy/maxi. Hasn't been OOB this admission. Will continue to assess and monitor.     Outcome: Ongoing

## 2021-05-16 NOTE — PROGRESS NOTES
[facility]    [facility]. [facility]              [phone]                          Interval History and plan:      PTH is 98 ( elevated ). greta has primary hyperparathyroidism   Vitamin D is normal 41  Calcium is slightly better  Check PTH scan for adenoma    Check serum for free light chains. Continue with IV fluid. Assessment :     Hypercalcemia: She has chronic mild hypercalcemia. She presented with a calcium of 11.2. CT abdomen in the past chest showed small left renal calculus. At home she takes torsemide. It appears that she might have primary hyperparathyroidism. Continue with IV fluid. Urinalysis is normal.      [facility] would like to thank Arpit Christianson MD   for opportunity to serve this patient      Please call with questions at-   24 Hrs Answering service [phone] or  7 am- 5 pm via Perfect serve or cell phone  Breann Sanchez MD          CC/reason for consult :     Hypercalcemia     HPI :     Antoinette Puga is a 68 y.o. female presented to   the hospital on 5/14/2021 with weakness. She has past medical history of PE, schizophrenia, CVA, urinary incontinence, dementia, history of hemiparesis post CVA, hypertension and diabetes. She presented to ER with change in mental status and generalized weakness. As per the home care nurse patient was noted to have right eye droop and generalized weakness. Since she had previous CVAs in the past she was brought into the ER. She recently had UTI from Klebsiella. It is worth mentioning patient cannot give me any history. Most of the history was obtained by reviewing the chart and speaking to the staff. When she arrived calcium was 11.2 with a normal creatinine. I was called for further management of hypercalcemia.     ROS:     Seen with-no family in the room    positives in bold   Cannot be obtained                     All other remaining systems are negative or unable to injection 10 mL, 10 mL, Intravenous, PRN  0.9 % sodium chloride infusion, 25 mL, Intravenous, PRN  promethazine (PHENERGAN) tablet 12.5 mg, 12.5 mg, Oral, Q6H PRN **OR** ondansetron (ZOFRAN) injection 4 mg, 4 mg, Intravenous, Q6H PRN  enoxaparin (LOVENOX) injection 40 mg, 40 mg, Subcutaneous, Daily  atorvastatin (LIPITOR) tablet 40 mg, 40 mg, Oral, Nightly  insulin lispro (1 Unit Dial) 0-12 Units, 0-12 Units, Subcutaneous, TID WC  insulin lispro (1 Unit Dial) 0-6 Units, 0-6 Units, Subcutaneous, Nightly  0.9 % sodium chloride infusion, , Intravenous, Continuous       Vitals :     Vitals:    05/16/21 0706   BP: (!) 154/71   Pulse: 61   Resp: 16   Temp: 98 °F (36.7 °C)   SpO2: 99%       I & O :       Intake/Output Summary (Last 24 hours) at 5/16/2021 0754  Last data filed at 5/16/2021 0339  Gross per 24 hour   Intake 3501 ml   Output 1750 ml   Net 1751 ml        Physical Examination :     General appearance: Awake but confused. HEENT: Lips- normal, teeth- ok , oral mucosa- moist  Neck : Mass- no, appears symmetrical, JVD- not visible  Respiratory: Respiratory effort-okay, wheeze- no, crackles -none  Cardiovascular:  Ausculation- No M/R/G, Edema none  Abdomen: visible mass- no, distention- no, scar- no, tenderness- no                            hepatosplenomegaly-  no  Musculoskeletal:  generalized bilateral weakness  Skin: rashes- no , ulcers- no, induration- no, tightening - no  Psychiatric: Not evaluated     LABS:     Recent Labs     05/14/21  1214 05/15/21  0601   WBC 8.0 7.9   HGB 12.4 11.9*   HCT 38.3 36.4    241     Recent Labs     05/15/21  0601 05/15/21  0822 05/16/21  0632    137 136   K 4.5 4.6 4.1    105 104   CO2 23 25 23   BUN 10 10 9   CREATININE 0.8 0.8 0.8   GLUCOSE 283* 254* 177*   PHOS  --  2.7 2.9      Nephrology  [address] [facility], [address] [place] [address]  Office: [phone]  Fax: [phone]

## 2021-05-16 NOTE — PLAN OF CARE
Problem: Safety:  Goal: Free from accidental physical injury  Description: Free from accidental physical injury  Outcome: Met This Shift   Pt free from injury this shift and free of falls. 2/4 rails up on bed and bed is in the lowest position. Wheels locked and bed alarm set. Socks on pt and ID bands on pt. Call light in reach of pt and pt educated to call out to get up via gb and raghu steady x2. Will continue to monitor for safety. Problem: Pain:  Goal: Patient's pain/discomfort is manageable  Description: Patient's pain/discomfort is manageable  Outcome: Met This Shift   Denies pain overnight.

## 2021-05-16 NOTE — PROGRESS NOTES
Hospitalist Progress Note      PCP: Cortney Deluna    Chief Complaint. Patient is a 44-year-old female with past medical history of schizophrenia, diabetes mellitus, DVT status post IVC filter, dementia who presented to hospital due to altered mental status, generalized weakness. Patient is unable to provide reliable history due to multiple comorbid conditions, reportedly patient presented to the emergency department today as patient was noted to have change in mental status, generalized weakness. According to the patient's home care nurse patient was noted to have right eye droop and generalized weakness, patient is usually more alert and awake however patient was found to be weak and not communicating much. Patient has history of previous CVA in the past.  Patient was recently treated for urinary tract infection on last admission. No reported fevers chills chest pain shortness of breath.     Date of Admission: 5/14/2021    Subjective:  Patient seemed more confused today, no CP, SOB, fever reported    Medications:  Reviewed    Infusion Medications    dextrose      sodium chloride       Scheduled Medications    losartan  50 mg Oral Daily    torsemide  40 mg Oral Daily    clopidogrel  75 mg Oral Daily    famotidine  20 mg Oral Daily    metoprolol tartrate  25 mg Oral BID    risperiDONE  2 mg Oral Nightly    sodium chloride flush  10 mL Intravenous 2 times per day    enoxaparin  40 mg Subcutaneous Daily    atorvastatin  40 mg Oral Nightly    insulin lispro  0-12 Units Subcutaneous TID WC    insulin lispro  0-6 Units Subcutaneous Nightly     PRN Meds: perflutren lipid microspheres, glucose, dextrose, glucagon (rDNA), dextrose, sodium chloride flush, sodium chloride, promethazine **OR** ondansetron      Intake/Output Summary (Last 24 hours) at 5/16/2021 1226  Last data filed at 5/16/2021 0833  Gross per 24 hour   Intake 2628 ml   Output 1200 ml   Net 1428 ml       Physical Exam Performed:    BP 126/77   Pulse 65   Temp 97.8 °F (36.6 °C) (Oral)   Resp 16   Ht 5' 6\" (1.676 m)   Wt 257 lb (116.6 kg)   SpO2 99%   BMI 41.48 kg/m²     General appearance: NAD, some what lethargic slow to respond, sitting n chair  HEENT:  Conjunctivae/corneas clear. Neck: Supple, with full range of motion. Respiratory:  Normal respiratory effort. Clear to auscultation, bilaterally without Rales/Wheezes/Rhonchi. Cardiovascular: Regular rate and rhythm with normal S1/S2 without murmurs or rubs  Abdomen: Soft, non-tender, non-distended, normal bowel sounds. Musculoskeletal: No cyanosis or edema bilaterally  Neurologic:  Seems to have R facial droop,  Psychiatric: Alert and oriented x 1, Normal mood  Peripheral Pulses: +2 palpable, equal bilaterally       Labs:   Recent Labs     05/14/21  1214 05/15/21  0601   WBC 8.0 7.9   HGB 12.4 11.9*   HCT 38.3 36.4    241     Recent Labs     05/15/21  0601 05/15/21  0822 05/16/21  0632    137 136   K 4.5 4.6 4.1    105 104   CO2 23 25 23   BUN 10 10 9   CREATININE 0.8 0.8 0.8   CALCIUM 10.8* 10.9* 10.6   PHOS  --  2.7 2.9     Recent Labs     05/14/21  1214   AST 20   ALT 18   BILITOT 0.5   ALKPHOS 128     Recent Labs     05/15/21  0601   INR 0.98     No results for input(s): CKTOTAL, TROPONINI in the last 72 hours. Urinalysis:      Lab Results   Component Value Date    NITRU Negative 05/14/2021    WBCUA 21-50 04/15/2021    BACTERIA 4+ 04/15/2021    RBCUA 3-4 04/15/2021    BLOODU Negative 05/14/2021    SPECGRAV 1.020 05/14/2021    GLUCOSEU Negative 05/14/2021       Radiology:  MRA NECK W WO CONTRAST   Final Result      1. Small acute bland infarct at the left frontoparietal junction. 2.  Chronic right frontal lobe infarct. 3.  Moderate chronic small vessel ischemic disease. 4.  No aneurysms, vascular occlusions, or intracranial stenoses identified. 5.  No significant stenosis in the extracranial vertebral or carotid arteries.          MRA HEAD WO CONTRAST Final Result      1. Small acute bland infarct at the left frontoparietal junction. 2.  Chronic right frontal lobe infarct. 3.  Moderate chronic small vessel ischemic disease. 4.  No aneurysms, vascular occlusions, or intracranial stenoses identified. 5.  No significant stenosis in the extracranial vertebral or carotid arteries. MRI BRAIN WO CONTRAST   Final Result      1. Small acute bland infarct at the left frontoparietal junction. 2.  Chronic right frontal lobe infarct. 3.  Moderate chronic small vessel ischemic disease. 4.  No aneurysms, vascular occlusions, or intracranial stenoses identified. 5.  No significant stenosis in the extracranial vertebral or carotid arteries. XR CHEST PORTABLE   Final Result      Elevated right hemidiaphragm. Clear lungs. Stable top normal heart size. CT HEAD WO CONTRAST   Final Result      Age-related changes and old infarct without acute intracranial abnormality. NM PARATHYORID W SPECT    (Results Pending)   VL Extremity Venous Bilateral    (Results Pending)         Assessment/Plan:    Active Hospital Problems    Diagnosis     AMS (altered mental status) [R41.82]          Patient is a 70-year-old female with past medical history of schizophrenia, diabetes mellitus, DVT status post IVC filter, dementia who presented to hospital due to altered mental status, generalized weakness. Patient is unable to provide reliable history due to multiple comorbid conditions, reportedly patient presented to the emergency department today as patient was noted to have change in mental status, generalized weakness. According to the patient's home care nurse patient was noted to have right eye droop and generalized weakness, patient is usually more alert and awake however patient was found to be weak and not communicating much.   Patient has history of previous CVA in the past.  Patient was recently treated for urinary tract infection on last admission. No reported fevers chills chest pain shortness of breath.     Assessment  Acute metabolic encephalopathy likely due to CVA  Acute CVA  Generalized weakness  Diabetes mellitus  Schizophrenia  DVT status post IVC filter     Plan  MRI brain ordered from ED - shows acute cva,echo pending result, CT head unremarkable for acute neurologic process, MRA head performed-shows CVA, chest x-ray negative for consolidation, repeat Ct head  UA-unremarkable  Consult PT/OT/speech therapy/neurology  Restarted on home diuretic, hold IV fluids  Resume home aspirin, statin  Insulin sliding scale  home medications after speech eval  Changed asa to plavix  DVT prophylaxis-Lovenox  Diet: ordered  Code Status: full     PT/OT Eval Status: ordered     Dispo - pending clinical improvement  Diet: DIET CARB CONTROL; Carb Control: 3 carb choices (45 gms)/meal; Low Sodium (2 GM);  Dental Soft; Mildly Thick (Nectar)  Code Status: Full Code    PT/OT Eval Status: ordered    Dispo - likely dc on Monday, will need PT/OT evals again    Vick Manzano MD

## 2021-05-16 NOTE — PROGRESS NOTES
to enter with rails  Entrance Stairs - Number of Steps: 4 ILDA thru front door  Bathroom Shower/Tub: Shower chair with back  [employer]: Bedside commode (daughter uses BSC, not pt)  Bathroom Equipment: Shower chair, Grab bars in shower, Grab bars around toilet  Home Equipment: Rolling walker, [employer], Lift chair, Grab bars, Alert Button (bedrail)  Receives Help From: Home health, Personal care attendant  ADL Assistance: Needs assistance (chart reports [facility] services)  Homemaking Assistance: Needs assistance (Chart reports  supports)  Ambulation Assistance: Independent (with rolling walker)  Transfer Assistance: Independent  Active : No  Additional Comments: Per CM notes:Patient usually lives at home with disabled daughter. Patient has caregivers 8 hours a day. [place] [facility] for 4 hours a day and private duty 4 hours a day. Patient is usually independent with most ADLs. She is usually independent in ambulation with walker; Pt reports 2 falls in one week recently; Patient reports caregiver there during day only. Objective          AROM RLE (degrees)  RLE AROM: WFL  AROM LLE (degrees)  LLE AROM : WFL  Strength RLE  Comment: MMT not performed- pt reports LEs are very sensitive to touch (able to perform indep LAQ)  Strength LLE  Comment: MMT not performed- pt reports LEs are very sensitive to touch (able to perform indep LAQ)        Bed mobility  Supine to Sit: Stand by assistance (with rail, HOB elev; effortful)  Transfers  Sit to Stand: Minimal Assistance (cues for hand placement)  Stand to sit: Minimal Assistance (cues to lower slowly)  Ambulation  Ambulation?: Yes  Ambulation 1  Surface: level tile  Device: Rolling Walker  Assistance: Minimal assistance  Quality of Gait: slow tonie, wide LASHAY, occasional A to maneuver walker especially when turning, flexed posture  Distance: 5', 20' x 2  Comments: Reports L knee pain with ambulation.   Stairs/Curb  Stairs?: No     Balance  Sitting - Static: Good (SBA sitting EOB)  Standing - Static:  (CGA at sink to wash hands)    Treatment included gait/transfer training, pt education. Assisted to/from toilet for BM during session. A needed for clean up, placement of new brief/purewick.     Plan   Plan  Times per week: 5-7  Current Treatment Recommendations: Strengthening, Balance Training, Functional Mobility Training, Transfer Training, Gait Training, Patient/Caregiver Education & Training  Safety Devices  Type of devices: Call light within reach, Chair alarm in place, Nurse notified, Left in chair                                                      AM-PAC Score  AM-PAC Inpatient Mobility Raw Score : 17 (05/16/21 0956)  AM-PAC Inpatient T-Scale Score : 42.13 (05/16/21 0956)  Mobility Inpatient CMS 0-100% Score: 50.57 (05/16/21 0956)  Mobility Inpatient CMS G-Code Modifier : CK (05/16/21 0956)          Goals  Pt goal \"to get better\"  Short term goals  Time Frame for Short term goals: dc  Short term goal 1: Sit<>stand SBA  Short term goal 2: Ambulate 36' with RW SBA  Short term goal 3: up/down 4 steps with rail CGA  Short term goal 4: Bed Mobility HOB flat with rail SBA       Therapy Time   Individual Concurrent Group Co-treatment   Time In 0840         Time Out 0953         Minutes 73           Timed Code Treatment Minutes:   60    Total Treatment Minutes:  [address], [address] [facility]

## 2021-05-17 ENCOUNTER — APPOINTMENT (OUTPATIENT)
Dept: VASCULAR LAB | Age: 81
DRG: 064 | End: 2021-05-17
Payer: MEDICARE

## 2021-05-17 ENCOUNTER — APPOINTMENT (OUTPATIENT)
Dept: NUCLEAR MEDICINE | Age: 81
DRG: 064 | End: 2021-05-17
Payer: MEDICARE

## 2021-05-17 LAB
GLUCOSE BLD-MCNC: 189 MG/DL (ref 70–99)
GLUCOSE BLD-MCNC: 208 MG/DL (ref 70–99)
GLUCOSE BLD-MCNC: 233 MG/DL (ref 70–99)
GLUCOSE BLD-MCNC: 267 MG/DL (ref 70–99)
LV EF: 55 %
LVEF MODALITY: NORMAL
PERFORMED ON: ABNORMAL

## 2021-05-17 PROCEDURE — 3430000000 HC RX DIAGNOSTIC RADIOPHARMACEUTICAL: Performed by: INTERNAL MEDICINE

## 2021-05-17 PROCEDURE — 2580000003 HC RX 258: Performed by: INTERNAL MEDICINE

## 2021-05-17 PROCEDURE — C8929 TTE W OR WO FOL WCON,DOPPLER: HCPCS

## 2021-05-17 PROCEDURE — 6360000002 HC RX W HCPCS: Performed by: INTERNAL MEDICINE

## 2021-05-17 PROCEDURE — 6360000004 HC RX CONTRAST MEDICATION: Performed by: PSYCHIATRY & NEUROLOGY

## 2021-05-17 PROCEDURE — 93970 EXTREMITY STUDY: CPT

## 2021-05-17 PROCEDURE — 6370000000 HC RX 637 (ALT 250 FOR IP): Performed by: INTERNAL MEDICINE

## 2021-05-17 PROCEDURE — 92526 ORAL FUNCTION THERAPY: CPT

## 2021-05-17 PROCEDURE — A9500 TC99M SESTAMIBI: HCPCS | Performed by: INTERNAL MEDICINE

## 2021-05-17 PROCEDURE — 78071 PARATHYRD PLANAR W/WO SUBTRJ: CPT

## 2021-05-17 PROCEDURE — 1200000000 HC SEMI PRIVATE

## 2021-05-17 PROCEDURE — 6370000000 HC RX 637 (ALT 250 FOR IP): Performed by: PSYCHIATRY & NEUROLOGY

## 2021-05-17 RX ADMIN — INSULIN LISPRO 6 UNITS: 100 INJECTION, SOLUTION INTRAVENOUS; SUBCUTANEOUS at 17:26

## 2021-05-17 RX ADMIN — LOSARTAN POTASSIUM 50 MG: 25 TABLET, FILM COATED ORAL at 09:29

## 2021-05-17 RX ADMIN — PERFLUTREN 1.65 MG: 6.52 INJECTION, SUSPENSION INTRAVENOUS at 12:44

## 2021-05-17 RX ADMIN — TETRAKIS(2-METHOXYISOBUTYLISOCYANIDE)COPPER(I) TETRAFLUOROBORATE 29.8 MILLICURIE: 1 INJECTION, POWDER, LYOPHILIZED, FOR SOLUTION INTRAVENOUS at 10:25

## 2021-05-17 RX ADMIN — FAMOTIDINE 20 MG: 20 TABLET, FILM COATED ORAL at 09:30

## 2021-05-17 RX ADMIN — CLOPIDOGREL BISULFATE 75 MG: 75 TABLET ORAL at 09:29

## 2021-05-17 RX ADMIN — ENOXAPARIN SODIUM 40 MG: 40 INJECTION SUBCUTANEOUS at 09:29

## 2021-05-17 RX ADMIN — TORSEMIDE 40 MG: 20 TABLET ORAL at 09:29

## 2021-05-17 RX ADMIN — Medication 10 ML: at 09:31

## 2021-05-17 RX ADMIN — RISPERIDONE 2 MG: 1 TABLET ORAL at 20:35

## 2021-05-17 RX ADMIN — METOPROLOL TARTRATE 25 MG: 25 TABLET, FILM COATED ORAL at 20:35

## 2021-05-17 RX ADMIN — Medication 10 ML: at 20:35

## 2021-05-17 RX ADMIN — ATORVASTATIN CALCIUM 40 MG: 40 TABLET, FILM COATED ORAL at 20:35

## 2021-05-17 RX ADMIN — INSULIN LISPRO 4 UNITS: 100 INJECTION, SOLUTION INTRAVENOUS; SUBCUTANEOUS at 14:49

## 2021-05-17 RX ADMIN — INSULIN LISPRO 1 UNITS: 100 INJECTION, SOLUTION INTRAVENOUS; SUBCUTANEOUS at 20:36

## 2021-05-17 RX ADMIN — INSULIN LISPRO 4 UNITS: 100 INJECTION, SOLUTION INTRAVENOUS; SUBCUTANEOUS at 07:29

## 2021-05-17 RX ADMIN — METOPROLOL TARTRATE 25 MG: 25 TABLET, FILM COATED ORAL at 09:30

## 2021-05-17 RX ADMIN — Medication 10 ML: at 10:36

## 2021-05-17 NOTE — PROGRESS NOTES
Physician Progress Note      Alexandria Painter  [facility] #:                  [id number]  :                       1940  ADMIT DATE:       2021 11:40 AM  DISCH DATE:  RESPONDING  PROVIDER #:        Delinda Epley MD          QUERY TEXT:    Pt admitted with weakness, right eye droop and has Metabolic encephalopathy   documented. If possible, please document in progress notes and discharge   summary further specificity regarding the cause of encephalopathy:    The medical record reflects the following:  Risk Factors: CVA  Clinical Indicators: Per H&P \"Acute metabolic encephalopathy, unclear   etiology\", currently no metabolic process noted in work up. MRI: Small acute   bland infarct at the left frontoparietal junction  Treatment: ASA, MRI/ MRA  Options provided:  -- Encephalopathy due to acute CVA  -- Other - I will add my own diagnosis  -- Disagree - Not applicable / Not valid  -- Disagree - Clinically unable to determine / Unknown  -- Refer to Clinical Documentation Reviewer    PROVIDER RESPONSE TEXT:    This patient has encephalopathy due to acute CVA. Query created by: Milton Thapa on 5/15/2021 11:27 AM      QUERY TEXT:    Patient admitted with BMI 41.48. If possible, please document in progress   notes and discharge summary if you are evaluating and /or treating any of the   following: The medical record reflects the following:  Risk Factors: CVA, Primary hyperparathyroidism  Clinical Indicators: BMI 41.48, 257 LBS  Treatment: counseled on weight loss  Options provided:  -- Morbid obesity  -- Other - I will add my own diagnosis  -- Disagree - Not applicable / Not valid  -- Disagree - Clinically unable to determine / Unknown  -- Refer to Clinical Documentation Reviewer    PROVIDER RESPONSE TEXT:    This patient has morbid obesity.     Query created by: Arielle Davenport on 2021 6:48 AM      Electronically signed by:  Delinda Epley MD 2021 4:09 PM

## 2021-05-17 NOTE — PROGRESS NOTES
Hospitalist Progress Note      PCP: Stacey Queen    Chief Complaint. Patient is a 70-year-old female with past medical history of schizophrenia, diabetes mellitus, DVT status post IVC filter, dementia who presented to hospital due to altered mental status, generalized weakness. Patient is unable to provide reliable history due to multiple comorbid conditions, reportedly patient presented to the emergency department today as patient was noted to have change in mental status, generalized . According to the patient's home care nurse patient was noted to have right eye droop and generalized weakness, patient is usually more alert and awake however patient was found to be weak and not communicating much. Patient has history of previous CVA in the past.  Patient was recently treated for urinary tract infection on last admission. No reported fevers chills chest pain shortness of breath.     Date of Admission: 5/14/2021    Subjective:  Patient seemed more confused today, no CP, SOB, fever reported    Medications:  Reviewed    Infusion Medications    dextrose      sodium chloride       Scheduled Medications    losartan  50 mg Oral Daily    torsemide  40 mg Oral Daily    clopidogrel  75 mg Oral Daily    famotidine  20 mg Oral Daily    metoprolol tartrate  25 mg Oral BID    risperiDONE  2 mg Oral Nightly    sodium chloride flush  10 mL Intravenous 2 times per day    enoxaparin  40 mg Subcutaneous Daily    atorvastatin  40 mg Oral Nightly    insulin lispro  0-12 Units Subcutaneous TID WC    insulin lispro  0-6 Units Subcutaneous Nightly     PRN Meds: glucose, dextrose, glucagon (rDNA), dextrose, sodium chloride flush, sodium chloride, promethazine **OR** ondansetron      Intake/Output Summary (Last 24 hours) at 5/17/2021 1308  Last data filed at 5/17/2021 0726  Gross per 24 hour   Intake 730 ml   Output 3900 ml   Net -3170 ml       Physical Exam Performed:    /70   Pulse 60   Temp 98.3 °F (36.8 °C) (Oral)   Resp 16   Ht 5' 6\" (1.676 m)   Wt 257 lb (116.6 kg)   SpO2 99%   BMI 41.48 kg/m²     General appearance: NAD, some what lethargic slow to respond, sitting n chair  HEENT:  Conjunctivae/corneas clear. Neck: Supple, with full range of motion. Respiratory:  Normal respiratory effort. Clear to auscultation, bilaterally without Rales/Wheezes/Rhonchi. Cardiovascular: Regular rate and rhythm with normal S1/S2 without murmurs or rubs  Abdomen: Soft, non-tender, non-distended, normal bowel sounds. Musculoskeletal: No cyanosis or edema bilaterally  Neurologic:  Seems to have R facial droop,  Psychiatric: Alert and oriented x 1, Normal mood  Peripheral Pulses: +2 palpable, equal bilaterally       Labs:   Recent Labs     05/15/21  0601   WBC 7.9   HGB 11.9*   HCT 36.4        Recent Labs     05/15/21  0601 05/15/21  0822 05/16/21  0632    137 136   K 4.5 4.6 4.1    105 104   CO2 23 25 23   BUN 10 10 9   CREATININE 0.8 0.8 0.8   CALCIUM 10.8* 10.9* 10.6   PHOS  --  2.7 2.9     No results for input(s): AST, ALT, BILIDIR, BILITOT, ALKPHOS in the last 72 hours. Recent Labs     05/15/21  0601   INR 0.98     No results for input(s): Nohemi Anchors in the last 72 hours. Urinalysis:      Lab Results   Component Value Date    NITRU Negative 05/14/2021    WBCUA 21-50 04/15/2021    BACTERIA 4+ 04/15/2021    RBCUA 3-4 04/15/2021    BLOODU Negative 05/14/2021    SPECGRAV 1.020 05/14/2021    GLUCOSEU Negative 05/14/2021       Radiology:  CT HEAD WO CONTRAST   Final Result      1. Age-related atrophy with patchy periventricular white matter changes bilaterally consistent with chronic small vessel ischemia. 3.  Stable right frontal lobe encephalomalacia consistent with remote infarct. Overall no significant interval change in appearance of the head from prior exam.      MRA NECK W WO CONTRAST   Final Result      1. Small acute bland infarct at the left frontoparietal junction.    2. Chronic right frontal lobe infarct. 3.  Moderate chronic small vessel ischemic disease. 4.  No aneurysms, vascular occlusions, or intracranial stenoses identified. 5.  No significant stenosis in the extracranial vertebral or carotid arteries. MRA HEAD WO CONTRAST   Final Result      1. Small acute bland infarct at the left frontoparietal junction. 2.  Chronic right frontal lobe infarct. 3.  Moderate chronic small vessel ischemic disease. 4.  No aneurysms, vascular occlusions, or intracranial stenoses identified. 5.  No significant stenosis in the extracranial vertebral or carotid arteries. MRI BRAIN WO CONTRAST   Final Result      1. Small acute bland infarct at the left frontoparietal junction. 2.  Chronic right frontal lobe infarct. 3.  Moderate chronic small vessel ischemic disease. 4.  No aneurysms, vascular occlusions, or intracranial stenoses identified. 5.  No significant stenosis in the extracranial vertebral or carotid arteries. XR CHEST PORTABLE   Final Result      Elevated right hemidiaphragm. Clear lungs. Stable top normal heart size. CT HEAD WO CONTRAST   Final Result      Age-related changes and old infarct without acute intracranial abnormality.             NM PARATHYORID W SPECT    (Results Pending)   VL Extremity Venous Bilateral    (Results Pending)         Assessment/Plan:    Active Hospital Problems    Diagnosis     AMS (altered mental status) [R41.82]          Patient is a 19-year-old female with past medical history of schizophrenia, diabetes mellitus, DVT status post IVC filter, dementia who presented to hospital due to altered mental status, generalized weakness.       Assessment  Acute metabolic encephalopathy likely due to CVA  Acute CVA  Generalized weakness  Diabetes mellitus  Schizophrenia  DVT status post IVC filter     Plan  MRI brain ordered from ED - shows acute cva,echo pending result, CT head unremarkable for acute

## 2021-05-17 NOTE — PROGRESS NOTES
Interval History:  Clinically stable per report.      Current Medications:    Current Facility-Administered Medications:     losartan (COZAAR) tablet 50 mg, 50 mg, Oral, Daily, Quentin Bills MD, 50 mg at 05/17/21 0929    torsemide (DEMADEX) tablet 40 mg, 40 mg, Oral, Daily, Mikey Orourke MD, 40 mg at 05/17/21 5807    clopidogrel (PLAVIX) tablet 75 mg, 75 mg, Oral, Daily, Kvng Kelly MD, 75 mg at 05/17/21 1131    perflutren lipid microspheres (DEFINITY) injection 1.65 mg, 1.5 mL, Intravenous, ONCE PRN, Kvng Kelly MD    famotidine (PEPCID) tablet 20 mg, 20 mg, Oral, Daily, Mikey Orourke MD, 20 mg at 05/17/21 0930    metoprolol tartrate (LOPRESSOR) tablet 25 mg, 25 mg, Oral, BID, Mikey Orourke MD, 25 mg at 05/17/21 0930    risperiDONE (RISPERDAL) tablet 2 mg, 2 mg, Oral, Nightly, Mikey Orourke MD, 2 mg at 05/16/21 2114    glucose (GLUTOSE) 40 % oral gel 15 g, 15 g, Oral, PRN, Mikey Orourke MD    dextrose 50 % IV solution, 12.5 g, Intravenous, PRN, Mikey Orourke MD    glucagon (rDNA) injection 1 mg, 1 mg, Intramuscular, PRN, Mikey Orourke MD    dextrose 5 % solution, 100 mL/hr, Intravenous, PRN, Mikey Orourke MD    sodium chloride flush 0.9 % injection 10 mL, 10 mL, Intravenous, 2 times per day, Mikey Orourke MD, 10 mL at 05/17/21 0931    sodium chloride flush 0.9 % injection 10 mL, 10 mL, Intravenous, PRN, Mikey Orourke MD, 10 mL at 05/17/21 1036    0.9 % sodium chloride infusion, 25 mL, Intravenous, PRN, Mikey Orourke MD    promethazine (PHENERGAN) tablet 12.5 mg, 12.5 mg, Oral, Q6H PRN **OR** ondansetron (ZOFRAN) injection 4 mg, 4 mg, Intravenous, Q6H PRN, Mikey Orourke MD    enoxaparin (LOVENOX) injection 40 mg, 40 mg, Subcutaneous, Daily, Mikey Orourke MD, 40 mg at 05/17/21 0929    atorvastatin (LIPITOR) tablet 40 mg, 40 mg, Oral, Nightly, Neymar Melissa MD, 40 mg at 05/16/21 2114    insulin lispro (1 Unit Dial) 0-12 Units, 0-12 Units, Subcutaneous, TID WC, Mikey Orourke MD, 4 Units at 05/17/21 0729    insulin lispro (1 Unit Dial) 0-6 Units, 0-6 Units, Subcutaneous, Nightly, Kevin Elise MD, 3 Units at 05/16/21 2093      Physical Exam  Constitutional  /70   Pulse 60   Temp 98.3 °F (36.8 °C) (Oral)   Resp 16   Ht 5' 6\" (1.676 m)   Wt 257 lb (116.6 kg)   SpO2 99%   BMI 41.48 kg/m²       Attempted to see x2 but she was away for testing. Images:   MRI brain wo contrast:  1. Small acute bland infarct at the left frontoparietal junction. 2.  Chronic right frontal lobe infarct. 3.  Moderate chronic small vessel ischemic disease. 4.  No aneurysms, vascular occlusions, or intracranial stenoses identified. 5.  No significant stenosis in the extracranial vertebral or carotid arteries. MRA head and neck:  MRA Neck:    The visualized aortic arch appears normal. The configuration of the brachiocephalic vessels is typical. The innominate artery and both subclavian arteries appear normal. The common carotid arteries appear normal. The carotid bifurcations appear normal. The cervical internal carotid arteries appear normal. The cervical vertebral arteries appear normal.      MRA Head:    The distal internal carotid arteries appear normal. The anterior and middle cerebral arteries appear normal. The distal vertebral arteries appear normal. The basilar artery and posterior cerebral arteries appear normal. No aneurysms, vascular occlusions,  or intracranial stenoses are identified. Echocardiogram:  Normal left ventricle size. There isborderline concentric left ventricular   hypertrophy. Overall left ventricular systolic function appears normal with   an ejection fraction of 55%. No regional wall motion abnormalities are   noted. Diastolic filling parameters suggests normal diastolic function. Aortic valve leaflets appear calcific/thickened but open adequately. Mild tricuspid regurgitation.    Estimated pulmonary artery systolic pressure is at 66-14 mmHg assuming a   right atrial pressure of 8 mmHg. A bubble study was performed and fails to show evidence of right to left   shunting. IVC not well visualized. Pertinent Labs:     Hemoglobin A1C: 10.9    Lipid panel:     Ref. Range 5/15/2021 06:01   Cholesterol, Total Latest Ref Range: 0 - 199 mg/dL 119   HDL Cholesterol Latest Ref Range: 40 - 60 mg/dL 57   LDL Calculated Latest Ref Range: <100 mg/dL 45   Triglycerides Latest Ref Range: 0 - 150 mg/dL 87   VLDL Cholesterol Calculated Latest Ref Range: Not Established mg/dL 17     Assessment:Small left MCA subcortical stroke. Improved clinically but based on location this is likely due to small vessel disease. Plan:  -Continue plavix 75mg PO daily  -Continue atorvastatin 40mg PO nightly  -Will need better blood glucose control as an outpatient  -No further neurologic workup, can follow up with Dr. Kassie Mason with neurology in one month.      SHAWNA Becker-CNP  [phone]

## 2021-05-17 NOTE — PROGRESS NOTES
[facility]    [facility] [[email or web address]. [facility]              [phone]                          Interval History and plan:      PTH is 98 ( elevated ). greta has primary hyperparathyroidism   Vitamin D is normal 41  Calcium is slightly better  Check PTH SPECT scan for adenoma    Check serum for free light chains. Stop IVF                     Assessment :     Hypercalcemia: She has chronic mild hypercalcemia. She presented with a calcium of 11.2. CT abdomen in the past chest showed small left renal calculus. At home she takes torsemide. It appears that she might have primary hyperparathyroidism. Continue with IV fluid. Urinalysis is normal.      [facility] would like to thank Lieutenant Vazquez MD   for opportunity to serve this patient      Please call with questions at-   24 Hrs Answering service [phone] or  7 am- 5 pm via Perfect serve or cell phone  Yennifer Singletary MD          CC/reason for consult :     Hypercalcemia     HPI :     Vignesh Zuniga is a 68 y.o. female presented to   the hospital on 5/14/2021 with weakness. She has past medical history of PE, schizophrenia, CVA, urinary incontinence, dementia, history of hemiparesis post CVA, hypertension and diabetes. She presented to ER with change in mental status and generalized weakness. As per the home care nurse patient was noted to have right eye droop and generalized weakness. Since she had previous CVAs in the past she was brought into the ER. She recently had UTI from Klebsiella. It is worth mentioning patient cannot give me any history. Most of the history was obtained by reviewing the chart and speaking to the staff. When she arrived calcium was 11.2 with a normal creatinine. I was called for further management of hypercalcemia.     ROS:     Seen with-no family in the room    positives in bold   Cannot be obtained                     All other remaining systems are negative or unable to chloride flush 0.9 % injection 10 mL, 10 mL, Intravenous, 2 times per day  sodium chloride flush 0.9 % injection 10 mL, 10 mL, Intravenous, PRN  0.9 % sodium chloride infusion, 25 mL, Intravenous, PRN  promethazine (PHENERGAN) tablet 12.5 mg, 12.5 mg, Oral, Q6H PRN **OR** ondansetron (ZOFRAN) injection 4 mg, 4 mg, Intravenous, Q6H PRN  enoxaparin (LOVENOX) injection 40 mg, 40 mg, Subcutaneous, Daily  atorvastatin (LIPITOR) tablet 40 mg, 40 mg, Oral, Nightly  insulin lispro (1 Unit Dial) 0-12 Units, 0-12 Units, Subcutaneous, TID WC  insulin lispro (1 Unit Dial) 0-6 Units, 0-6 Units, Subcutaneous, Nightly       Vitals :     Vitals:    05/17/21 0715   BP: 125/70   Pulse: 60   Resp: 16   Temp: 98.3 °F (36.8 °C)   SpO2: 99%       I & O :       Intake/Output Summary (Last 24 hours) at 5/17/2021 0833  Last data filed at 5/17/2021 0726  Gross per 24 hour   Intake 730 ml   Output 3900 ml   Net -3170 ml        Physical Examination :     General appearance: Awake but confused. HEENT: Lips- normal, teeth- ok , oral mucosa- moist  Neck : Mass- no, appears symmetrical, JVD- not visible  Respiratory: Respiratory effort-okay, wheeze- no, crackles -none  Cardiovascular:  Ausculation- No M/R/G, Edema none  Abdomen: visible mass- no, distention- no, scar- no, tenderness- no                            hepatosplenomegaly-  no  Musculoskeletal:  generalized bilateral weakness  Skin: rashes- no , ulcers- no, induration- no, tightening - no  Psychiatric: Not evaluated     LABS:     Recent Labs     05/14/21  1214 05/15/21  0601   WBC 8.0 7.9   HGB 12.4 11.9*   HCT 38.3 36.4    241     Recent Labs     05/15/21  0601 05/15/21  0822 05/16/21  0632    137 136   K 4.5 4.6 4.1    105 104   CO2 23 25 23   BUN 10 10 9   CREATININE 0.8 0.8 0.8   GLUCOSE 283* 254* 177*   PHOS  --  2.7 2.9      Nephrology  [address]  Office: [phone]  Fax: [phone]

## 2021-05-17 NOTE — PROGRESS NOTES
prolonged oral prep, positive swallow movement/onset, adequate oral clearance. Single cough noted with thins via cup (attempted to complete 3 oz screen, but pt unable to follow directions). No overt s/s aspiration or penetration noted for soilds or nectar thick liquids. Suspect lethargy may be impacting performance. Given h/o aspiration, suggest previously recommended diet of dental soft solids, nectar thick liquids, no straw. Will continue to follow. MBS results - not indicated at this time    Pain: denies    Current Diet : dental soft/nectar    Treatment:  Pt seen bedside to address the following goals:  Goal 1: Patient will tolerate least restrictive diet without overt signs of aspiration. 5/17: pt sitting up in bed feeding self, appears improved from initial session. Pt consuming pancakes and scrambled eggs, with adequate mastication no pocketing noted. Pt consumed nectar and trials of thin via cup including 3 ounces of uninterrupted swallows, and single sips via straw, with no overt signs of aspiration. Consecutive swallows via straw resulted in mild cough reflex. No respiratory decline per chart review. Recommend upgrade to dental soft/thin -no straw and will monitor need for MBS  Cont goal    Goal 2: Patient/caregiver will demonstrate understanding of swallowing concerns/recommendations. 5/15: Provided education to patient regarding swallow function, dysphagia, aspiration concerns, previous MBS results, diet recommendations, importance of upright position, avoidance of straws, recommendation for repeat instrumental. Suspect limited comprehension, needing further reinforcement. Cont goal.  5/17: pt educated to purpose of visit, rationale for current diet and upgrade to thin liquids with no straw. Pt stated comprehension but will require reminder for no straw. Cont goal    Goal 3: Patient will participate in repeat instrumental swallowing procedure as appropriate.   5/17: not indicated at this time  Cont goal as appropriate     Patient/Family/Caregiver Education:  As above    Compensatory Strategies:  90 degrees all meals  NO STRAW     Plan:  Continued daily Dysphagia treatment with goals per  plan of care. Diet recommendations: upgrade to dental soft/THIN liquids - NO STRAW  DC recommendation: tbd closer to dc  Treatment: 15  D/W nursing [facility]  Needs met prior to leaving room, call button in reach. Ayaz Rodgers M.S./[facility]-SLP #[id number]  Pg.  # [phone]  If patient is discharged prior to next treatment, this note will serve as the discharge summary

## 2021-05-17 NOTE — PROGRESS NOTES
Occupational Therapy/Physical therapy  Chart reviewed. Pt is currently off the unit for a test.  Pt was off unit earlier this AM as well. Will follow up later today as schedule permits. RN swapna. AMMY Au, [address] [place] [address], DPT  [phone]

## 2021-05-17 NOTE — CARE COORDINATION
Case Management Assessment           Daily Note                 Date/ Time of Note: 5/17/2021 1:48 PM         Note completed by: Alberto Lee RN    Patient Name: Galindo Granado  YOB: 1940    Diagnosis:AMS (altered mental status) [R41.82]  Patient Admission Status: Inpatient    Date of Admission:5/14/2021 11:40 AM Length of Stay: 3 GLOS: GMLOS: 2    Current Plan of Care: off the unit for testing  ________________________________________________________________________________________  PT AM-PAC: 17 / 24 per last evaluation on: 05.16.2021    OT AM-PAC: 13 / 24 per last evaluation on: 05.16.2021    DME Needs for discharge: deferred  ________________________________________________________________________________________  Discharge Plan: To Be Determined DUE TO: CM to follow up when patient returns to unit for discussion of SNF vs home w/ HHC    Tentative discharge date: TBD    Current barriers to discharge: testing with results, if agreeable to SNF will need pre-cert for SNF, medical stability and clearance for DC    Referrals completed: Not Applicable    Resources/ information provided: SNF List  ________________________________________________________________________________________  Case Management Notes: CM attempted to meet with patient at bedside for SNF discussion, patient off unit for testing at this time. CM left [employer] SNF list at bedside for review and will follow up with patient later this afternoon when available after testing. Patient will need pre-cert for SNF if agreeable. [place] and her family were provided with choice of provider; she and her family are in agreement with the discharge plan.     Care Transition Patient: Susana Lee RN  [facility] [employer]  Case Management Department  Ph: [phone]  Fax: [phone]

## 2021-05-18 LAB
ANION GAP SERPL CALCULATED.3IONS-SCNC: 11 MMOL/L (ref 3–16)
BUN BLDV-MCNC: 14 MG/DL (ref 7–20)
CALCIUM SERPL-MCNC: 10.6 MG/DL (ref 8.3–10.6)
CHLORIDE BLD-SCNC: 100 MMOL/L (ref 99–110)
CO2: 26 MMOL/L (ref 21–32)
CREAT SERPL-MCNC: 0.8 MG/DL (ref 0.6–1.2)
GFR AFRICAN AMERICAN: >60
GFR NON-AFRICAN AMERICAN: >60
GLUCOSE BLD-MCNC: 196 MG/DL (ref 70–99)
GLUCOSE BLD-MCNC: 251 MG/DL (ref 70–99)
GLUCOSE BLD-MCNC: 262 MG/DL (ref 70–99)
GLUCOSE BLD-MCNC: 265 MG/DL (ref 70–99)
GLUCOSE BLD-MCNC: 305 MG/DL (ref 70–99)
HCT VFR BLD CALC: 36.5 % (ref 36–48)
HEMOGLOBIN: 12.3 G/DL (ref 12–16)
KAPPA, FREE LIGHT CHAINS, SERUM: 26.57 MG/L (ref 3.3–19.4)
KAPPA/LAMBDA RATIO: 1.9 (ref 0.26–1.65)
KAPPA/LAMBDA TEST COMMENT: ABNORMAL
LAMBDA, FREE LIGHT CHAINS, SERUM: 13.96 MG/L (ref 5.71–26.3)
MCH RBC QN AUTO: 27.6 PG (ref 26–34)
MCHC RBC AUTO-ENTMCNC: 33.8 G/DL (ref 31–36)
MCV RBC AUTO: 81.7 FL (ref 80–100)
PDW BLD-RTO: 15.9 % (ref 12.4–15.4)
PERFORMED ON: ABNORMAL
PLATELET # BLD: 303 K/UL (ref 135–450)
PMV BLD AUTO: 9.1 FL (ref 5–10.5)
POTASSIUM REFLEX MAGNESIUM: 4.1 MMOL/L (ref 3.5–5.1)
RBC # BLD: 4.47 M/UL (ref 4–5.2)
SODIUM BLD-SCNC: 137 MMOL/L (ref 136–145)
WBC # BLD: 8.6 K/UL (ref 4–11)

## 2021-05-18 PROCEDURE — 6360000002 HC RX W HCPCS: Performed by: INTERNAL MEDICINE

## 2021-05-18 PROCEDURE — 6370000000 HC RX 637 (ALT 250 FOR IP): Performed by: PSYCHIATRY & NEUROLOGY

## 2021-05-18 PROCEDURE — 1200000000 HC SEMI PRIVATE

## 2021-05-18 PROCEDURE — 97535 SELF CARE MNGMENT TRAINING: CPT

## 2021-05-18 PROCEDURE — 6370000000 HC RX 637 (ALT 250 FOR IP): Performed by: INTERNAL MEDICINE

## 2021-05-18 PROCEDURE — 80048 BASIC METABOLIC PNL TOTAL CA: CPT

## 2021-05-18 PROCEDURE — 36415 COLL VENOUS BLD VENIPUNCTURE: CPT

## 2021-05-18 PROCEDURE — 97110 THERAPEUTIC EXERCISES: CPT

## 2021-05-18 PROCEDURE — 97116 GAIT TRAINING THERAPY: CPT

## 2021-05-18 PROCEDURE — 97530 THERAPEUTIC ACTIVITIES: CPT

## 2021-05-18 PROCEDURE — 92526 ORAL FUNCTION THERAPY: CPT

## 2021-05-18 PROCEDURE — 2580000003 HC RX 258: Performed by: INTERNAL MEDICINE

## 2021-05-18 PROCEDURE — 85027 COMPLETE CBC AUTOMATED: CPT

## 2021-05-18 RX ADMIN — FAMOTIDINE 20 MG: 20 TABLET, FILM COATED ORAL at 09:07

## 2021-05-18 RX ADMIN — INSULIN LISPRO 3 UNITS: 100 INJECTION, SOLUTION INTRAVENOUS; SUBCUTANEOUS at 20:27

## 2021-05-18 RX ADMIN — INSULIN LISPRO 6 UNITS: 100 INJECTION, SOLUTION INTRAVENOUS; SUBCUTANEOUS at 17:12

## 2021-05-18 RX ADMIN — ATORVASTATIN CALCIUM 40 MG: 40 TABLET, FILM COATED ORAL at 20:18

## 2021-05-18 RX ADMIN — ENOXAPARIN SODIUM 40 MG: 40 INJECTION SUBCUTANEOUS at 09:01

## 2021-05-18 RX ADMIN — METOPROLOL TARTRATE 25 MG: 25 TABLET, FILM COATED ORAL at 09:06

## 2021-05-18 RX ADMIN — INSULIN LISPRO 6 UNITS: 100 INJECTION, SOLUTION INTRAVENOUS; SUBCUTANEOUS at 12:36

## 2021-05-18 RX ADMIN — LOSARTAN POTASSIUM 50 MG: 25 TABLET, FILM COATED ORAL at 09:08

## 2021-05-18 RX ADMIN — INSULIN GLARGINE 15 UNITS: 100 INJECTION, SOLUTION SUBCUTANEOUS at 12:37

## 2021-05-18 RX ADMIN — METOPROLOL TARTRATE 25 MG: 25 TABLET, FILM COATED ORAL at 20:18

## 2021-05-18 RX ADMIN — CLOPIDOGREL BISULFATE 75 MG: 75 TABLET ORAL at 09:07

## 2021-05-18 RX ADMIN — RISPERIDONE 2 MG: 1 TABLET ORAL at 20:18

## 2021-05-18 RX ADMIN — INSULIN LISPRO 2 UNITS: 100 INJECTION, SOLUTION INTRAVENOUS; SUBCUTANEOUS at 09:10

## 2021-05-18 RX ADMIN — TORSEMIDE 40 MG: 20 TABLET ORAL at 09:08

## 2021-05-18 RX ADMIN — Medication 10 ML: at 09:01

## 2021-05-18 ASSESSMENT — PAIN SCALES - GENERAL: PAINLEVEL_OUTOF10: 0

## 2021-05-18 NOTE — PROGRESS NOTES
[facility]    [facility]. [facility]              [phone]                          Interval History and plan:      PTH is 98 ( elevated ). greta has primary hyperparathyroidism   Vitamin D is normal 41  Calcium is slightly better  PTH SPECT scan for negative adenoma    Check serum for free light chains. Stop IVF                     Assessment :     Hypercalcemia: She has chronic mild hypercalcemia. She presented with a calcium of 11.2. CT abdomen in the past chest showed small left renal calculus. At home she takes torsemide. It appears that she might have primary hyperparathyroidism. Continue with IV fluid. Urinalysis is normal.      [facility] would like to thank Wally Bernal MD   for opportunity to serve this patient      Please call with questions at-   24 Hrs Answering service [phone] or  7 am- 5 pm via Perfect serve or cell phone  Nayeli Daly MD          CC/reason for consult :     Hypercalcemia     HPI :     Niecy Romo is a 68 y.o. female presented to   the hospital on 5/14/2021 with weakness. She has past medical history of PE, schizophrenia, CVA, urinary incontinence, dementia, history of hemiparesis post CVA, hypertension and diabetes. She presented to ER with change in mental status and generalized weakness. As per the home care nurse patient was noted to have right eye droop and generalized weakness. Since she had previous CVAs in the past she was brought into the ER. She recently had UTI from Klebsiella. It is worth mentioning patient cannot give me any history. Most of the history was obtained by reviewing the chart and speaking to the staff. When she arrived calcium was 11.2 with a normal creatinine. I was called for further management of hypercalcemia.     ROS:     Seen with-no family in the room    positives in bold   Cannot be obtained                     All other remaining systems are negative or unable to flush 0.9 % injection 10 mL, 10 mL, Intravenous, PRN  0.9 % sodium chloride infusion, 25 mL, Intravenous, PRN  promethazine (PHENERGAN) tablet 12.5 mg, 12.5 mg, Oral, Q6H PRN **OR** ondansetron (ZOFRAN) injection 4 mg, 4 mg, Intravenous, Q6H PRN  enoxaparin (LOVENOX) injection 40 mg, 40 mg, Subcutaneous, Daily  atorvastatin (LIPITOR) tablet 40 mg, 40 mg, Oral, Nightly  insulin lispro (1 Unit Dial) 0-12 Units, 0-12 Units, Subcutaneous, TID WC  insulin lispro (1 Unit Dial) 0-6 Units, 0-6 Units, Subcutaneous, Nightly       Vitals :     Vitals:    05/18/21 0712   BP: 102/69   Pulse: 71   Resp: 16   Temp: 97.6 °F (36.4 °C)   SpO2: 98%       I & O :       Intake/Output Summary (Last 24 hours) at 5/18/2021 0750  Last data filed at 5/18/2021 0504  Gross per 24 hour   Intake 300 ml   Output 900 ml   Net -600 ml        Physical Examination :     General appearance: Awake but confused. HEENT: Lips- normal, teeth- ok , oral mucosa- moist  Neck : Mass- no, appears symmetrical, JVD- not visible  Respiratory: Respiratory effort-okay, wheeze- no, crackles -none  Cardiovascular: Ausculation- No M/R/G, Edema none  Abdomen: visible mass- no, distention- no, scar- no, tenderness- no                            hepatosplenomegaly-  no  Musculoskeletal:  generalized bilateral weakness  Skin: rashes- no , ulcers- no, induration- no, tightening - no  Psychiatric: Not evaluated     LABS:     No results for input(s): WBC, HGB, HCT, PLT in the last 72 hours.   Recent Labs     05/15/21  0822 05/16/21  0632    136   K 4.6 4.1    104   CO2 25 23   BUN 10 9   CREATININE 0.8 0.8   GLUCOSE 254* 177*   PHOS 2.7 2.9      Nephrology  [address] [place] [address], [facility] [address]  Office: [phone]  Fax: [phone]

## 2021-05-18 NOTE — PROGRESS NOTES
Secure message sent to MICHAEL Ritter NP [facility] they was scoring 0 but this morning at shift change I scored her 4. 2 for LOC questions didn't know month or age, has aphasia with pictures and saying what they are and limb ataxia for not being able to do heel slides. Per night RN there is no change in exam however.

## 2021-05-18 NOTE — PLAN OF CARE
Problem: Falls - Risk of:  Goal: Will remain free from falls  5/18/2021 0457 by Yosef Cavanaugh RN  Outcome: Ongoing  Note: Patient is a high fall risk. All fall precautions in place: bed alarm on, nonskid socks on pt, call light within reach, bed locked in lowest position. Will continue to monitor pt safety. Problem: Pain:  Goal: Patient's pain/discomfort is manageable  5/18/2021 0457 by Yosef Cavanaugh RN  Outcome: Ongoing  Note: Pt denies pain at this time, VSS, no objective signs of pain. Will continue to monitor.

## 2021-05-18 NOTE — PROGRESS NOTES
Physical Therapy  Daily Treatment Note    Discharge Recommendations: Galindo Granado scored a 15/24 on the AM-PAC short mobility form. Current research shows that an AM-PAC score of 17 or less is typically not associated with a discharge to the patient's home setting. Based on the patient's AM-PAC score and their current functional mobility deficits, it is recommended that the patient have 3-5 sessions per week of Physical Therapy at d/c to increase the patient's independence. Please see assessment section for further patient specific details. Assessment:  Mobility slow and effortful. Gait weak, but fairly steady with walker. Limited endurance due to fatigue and weakness. Needing cues for safe transfers with walker. Pt would benefit from continued IP PT at D/C prior to going home. Plan is for SNF. Equipment Needs: Defer to next level of care    Chart Reviewed: Yes     Other position/activity restrictions: up as rio prn   Additional Pertinent Hx: Adm 5/14 with change in ms & some R sided weakness noted earlier in day. History of schizophrenia, diabetes mellitus type 2, DVT w/ IVC filter, prior stroke with left weakness, and dementia. MRI brain:  Small acute bland infarct at the left frontoparietal junction; Chronic right frontal lobe infarct. Head CT:Age-related changes and old infarct without acute intracranial abnormality. Diagnosis: altered ms   Treatment Diagnosis: impaired mobility    Subjective: Pt in bed initially. Son present. Pt agreeable to working with PT and getting up to chair. Pt oriented to place, but not month/year. Able to state her name and son's name. Pain: \"Not really. \"     Objective:    Bed mobility  Supine to sit: Min assist x 1, HOB up partially with use of rail. Effortful and extra time required. Scooting: CGA to EOB with cues    Transfers  Sit to stand: Min assist x 1 from raised height bed to [place];  Mod assist x 1 from recliner to walker; Min assist x 1 from std chair to walker. Cues for hand placement with transfers when using walker. Stand to sit: Min assist x 1 into chair (x 3 times) with cues for hand placement and controlled descent. Bed > chair: Dependent via [place]    Ambulation  Assistance Level: Min assist x 1  Assistive device: Wheeled walker  Distance: 10 ft x 2. Seated rest between. Quality of gait: Step-through pattern; flexed posture; decreased pace; weak; no overt LOB    Exercises  15 reps B LE seated heel raises, toe raises; 10 reps B LE seated hip flexion (mod assist), LAQ, hip abd/add. Other: Occasional rest breaks. Balance  Sat EOB with CGA  Static stance with walker CGA  Taking steps with walker Min assist    Patient Education  Hand placement with transfers when using walker. Needs cues/reminders. Calling for assist with needs. Expressed understanding. Safety Devices  Pt left with needs in reach. In chair with chair alarm on. RN updated. Son present. AM-PAC score  AM-PAC Inpatient Mobility Raw Score : 15  AM-PAC Inpatient T-Scale Score : 39.45  Mobility Inpatient CMS 0-100% Score: 57.7  Mobility Inpatient CMS G-Code Modifier : CK    Goals: (as determined and assessed by primary PT)  Time Frame for Short term goals: dc  Short term goal 1: Sit<>stand SBA  ongoing  Short term goal 2: Ambulate 36' with RW SBA   ongoing  Short term goal 3: up/down 4 steps with rail CGA  ongoing  Short term goal 4: Bed Mobility HOB flat with rail SBA  ongoing     Plan:  Times per week: 5-7;    Current Treatment Recommendations: Strengthening, Balance Training, Functional Mobility Training, Transfer Training, Gait Training, Patient/Caregiver Education & Training    Therapy Time    Individual  Concurrent  Group  Co-treatment    Time In  1200            Time Out  1228            Minutes  28              Timed Code Treatment Minutes: 28  Total Treatment Minutes: 28    Will continue per plan of care.    If patient is discharged prior to next treatment, this note will serve as the discharge summary. Jackson Becker #[id number]    Addendum: 0 goals met. Continue per POC. This note will serve as a discharge summary if patient is discharged from hospital before next treatment session.    Janie Avalos, PT

## 2021-05-18 NOTE — PROGRESS NOTES
Secure message sent to Dr. Rajan Maciel to notify her that speech is recommending thin liquids with no straws.

## 2021-05-18 NOTE — CARE COORDINATION
Case Management Daily Note                    Date: 5/18/2021     Patient Name: Mian Abbott    Date of Admission: 5/14/2021 11:40 AM  YOB: 1940    Length of Stay: 4  GMLOS: 2.9         Patient Admission Status: Inpatient  Diagnosis:AMS (altered mental status) [R41.82]     ________________________________________________________________________________________  Discharge Plan: SNF: List needs to be sent to family   Patient unable to review list at bedside 2/2 JENNIFFER    DaughterSreedhar [phone]     Insurance: Payor: Mary Mcclain / Plan: Angeles Chang PPO / Product Type: Medicare /   Is pre-cert/notification needed: Yes     Tentative discharge date: 5/21    Current barriers: Placement needed, List provided today. Referrals completed: Not Applicable    Resources/ information provided: Not indicated at this time   ________________________________________________________________________________________  PT AM-PAC: 17 / 24 per last evaluation on: 5/17    OT AM-PAC: 15 / 24 per last evaluation on: 5/17    DME Needs for discharge: defer  ________________________________________________________________________________________  Notes/Plan of Care:   SW spoke with Dr. Lenin Holloway and learned possible medically ready 5/19 vs 5/20 Dr. Randy Kovacs to reach out to daughter by request.     1031 Vinod Pedro placed call to Daughter, Sreedhar Gamino ([phone]) at 11:38 AM. Daughter reported she uses a walker or cane at baseline. NICOLE provided update and noted Medical team will call her as well. SW discussed placement. Daughter agreeable for SW to e-mail list ad she does not live in [place] and she can not get to list left in patient's room. E-Mail: [email or web address] She will review the list for choices and follow up with NICOLE.     UPDATE: 2:27 pm NICOLE sent e-mail.      [place] and/or her family were provided with choice of provider; she and/or her family are in agreement with the discharge plan at this time.     Care Transition Patient: DWAIN Gabriel  [facility]   Case Management Department  Ph: [phone]

## 2021-05-18 NOTE — PROGRESS NOTES
Hospitalist Progress Note      PCP: Neisha Sheets    Chief Complaint. Patient is a 62-year-old female with past medical history of schizophrenia, diabetes mellitus, DVT status post IVC filter, dementia who presented to hospital due to altered mental status, generalized weakness. Patient is unable to provide reliable history due to multiple comorbid conditions, reportedly patient presented to the emergency department today as patient was noted to have change in mental status, generalized . According to the patient's home care nurse patient was noted to have right eye droop and generalized weakness, patient is usually more alert and awake however patient was found to be weak and not communicating much. Patient has history of previous CVA in the past.  Patient was recently treated for urinary tract infection on last admission. No reported fevers chills chest pain shortness of breath.     Date of Admission: 5/14/2021    Subjective: Patient seen and examined  More alert today  Working with physical therapy  Discussed care with daughter on phone  Potential discharge tomorrow, will need placement    Medications:  Reviewed    Infusion Medications    dextrose      sodium chloride       Scheduled Medications    losartan  50 mg Oral Daily    torsemide  40 mg Oral Daily    clopidogrel  75 mg Oral Daily    famotidine  20 mg Oral Daily    metoprolol tartrate  25 mg Oral BID    risperiDONE  2 mg Oral Nightly    sodium chloride flush  10 mL Intravenous 2 times per day    enoxaparin  40 mg Subcutaneous Daily    atorvastatin  40 mg Oral Nightly    insulin lispro  0-12 Units Subcutaneous TID WC    insulin lispro  0-6 Units Subcutaneous Nightly     PRN Meds: glucose, dextrose, glucagon (rDNA), dextrose, sodium chloride flush, sodium chloride, promethazine **OR** ondansetron      Intake/Output Summary (Last 24 hours) at 5/18/2021 0907  Last data filed at 5/18/2021 0901  Gross per 24 hour   Intake 310 ml Output 900 ml   Net -590 ml       Physical Exam Performed:    /69   Pulse 71   Temp 97.6 °F (36.4 °C) (Oral)   Resp 16   Ht 5' 6\" (1.676 m)   Wt 257 lb (116.6 kg)   SpO2 98%   BMI 41.48 kg/m²     General appearance: NAD, some what lethargic slow to respond, sitting n chair  HEENT:  Conjunctivae/corneas clear. Neck: Supple, with full range of motion. Respiratory:  Normal respiratory effort. Clear to auscultation, bilaterally without Rales/Wheezes/Rhonchi. Cardiovascular: Regular rate and rhythm with normal S1/S2 without murmurs or rubs  Abdomen: Soft, non-tender, non-distended, normal bowel sounds. Musculoskeletal: No cyanosis or edema bilaterally  Neurologic:  Seems to have R facial droop,  Psychiatric: Alert and oriented x 1, Normal mood  Peripheral Pulses: +2 palpable, equal bilaterally       Labs:   No results for input(s): WBC, HGB, HCT, PLT in the last 72 hours. Recent Labs     05/16/21  0632      K 4.1      CO2 23   BUN 9   CREATININE 0.8   CALCIUM 10.6   PHOS 2.9     No results for input(s): AST, ALT, BILIDIR, BILITOT, ALKPHOS in the last 72 hours. No results for input(s): INR in the last 72 hours. No results for input(s): Ethelene Soulier in the last 72 hours. Urinalysis:      Lab Results   Component Value Date    NITRU Negative 05/14/2021    WBCUA 21-50 04/15/2021    BACTERIA 4+ 04/15/2021    RBCUA 3-4 04/15/2021    BLOODU Negative 05/14/2021    SPECGRAV 1.020 05/14/2021    GLUCOSEU Negative 05/14/2021       Radiology:  VL Extremity Venous Bilateral         NM PARATHYORID W SPECT   Final Result      There are no scintigraphic findings to identify or localize a parathyroid adenoma. CT HEAD WO CONTRAST   Final Result      1. Age-related atrophy with patchy periventricular white matter changes bilaterally consistent with chronic small vessel ischemia. 3.  Stable right frontal lobe encephalomalacia consistent with remote infarct.   Overall no significant interval change in appearance of the head from prior exam.      MRA NECK W WO CONTRAST   Final Result      1. Small acute bland infarct at the left frontoparietal junction. 2.  Chronic right frontal lobe infarct. 3.  Moderate chronic small vessel ischemic disease. 4.  No aneurysms, vascular occlusions, or intracranial stenoses identified. 5.  No significant stenosis in the extracranial vertebral or carotid arteries. MRA HEAD WO CONTRAST   Final Result      1. Small acute bland infarct at the left frontoparietal junction. 2.  Chronic right frontal lobe infarct. 3.  Moderate chronic small vessel ischemic disease. 4.  No aneurysms, vascular occlusions, or intracranial stenoses identified. 5.  No significant stenosis in the extracranial vertebral or carotid arteries. MRI BRAIN WO CONTRAST   Final Result      1. Small acute bland infarct at the left frontoparietal junction. 2.  Chronic right frontal lobe infarct. 3.  Moderate chronic small vessel ischemic disease. 4.  No aneurysms, vascular occlusions, or intracranial stenoses identified. 5.  No significant stenosis in the extracranial vertebral or carotid arteries. XR CHEST PORTABLE   Final Result      Elevated right hemidiaphragm. Clear lungs. Stable top normal heart size. CT HEAD WO CONTRAST   Final Result      Age-related changes and old infarct without acute intracranial abnormality. Assessment/Plan:    Active Hospital Problems    Diagnosis     AMS (altered mental status) [R41.82]          Patient is a 62-year-old female with past medical history of schizophrenia, diabetes mellitus, DVT status post IVC filter, dementia who presented to hospital due to altered mental status, generalized weakness.       Assessment  1. Acute metabolic encephalopathy likely due to CVA    2. Generalized weakness    3. Diabetes mellitus, type 2    4. Schizophrenia    5.  DVT status post IVC filter, patient was on Coumadin and Xarelto at one-point which was stopped due to GI bleed in 2018, per chart      Plan  MRI brain ordered from ED - shows acute cva, echo without pfo, CT head unremarkable for acute neurologic process, MRA head performed-shows CVA, chest x-ray negative for consolidation, repeat Ct head    UA-unremarkable    Consult PT/OT/speech therapy/neurology    Restarted on home diuretic, hold IV fluids  Resume home aspirin, statin  Insulin sliding scale  home medications after speech eval    Changed asa to plavix  DVT prophylaxis-Lovenox  Diet: ordered  Code Status: full     PT/OT Eval Status: ordered     Dispo - pending clinical improvement  Diet: DIET CARB CONTROL; Carb Control: 3 carb choices (45 gms)/meal; Low Sodium (2 GM); Dental Soft; Mildly Thick (Nectar)  Code Status: Full Code    PT/OT Eval Status: ordered    Dispo - inpatient ,potential d/c tomorrow,  will need placement.      Leanne Dunaway MD

## 2021-05-18 NOTE — PROGRESS NOTES
Occupational Therapy  Facility/Department: [facility] 5T ORTHO/NEURO  Daily Treatment Note  NAME: Pardeep Breen  : 1940  MRN: [id number]    Date of Service: 2021    Discharge Recommendations: Pardeep Breen scored a 14/24 on the AM-PAC ADL Inpatient form. Current research shows that an AM-PAC score of 17 or less is typically not associated with a discharge to the patient's home setting. Based on the patient's AM-PAC score and their current ADL deficits, it is recommended that the patient have 3-5 sessions per week of Occupational Therapy at d/c to increase the patient's independence. Please see assessment section for further patient specific details. If patient discharges prior to next session this note will serve as a discharge summary. Please see below for the latest assessment towards goals. Assessment   Assessment: Pt requiring increased time for processing, understanding and completion of all tasks. Pt making 4 attempts for stance from EOB wtih bed raised. Pt unable to complete stance this date. ADL tasks completed seated EOB with CGA to Min A with significant increased time and VCs. Pt would benefit from inpt OT for maximizing functional indepdence and safety. Continue OT per POC. Treatment Diagnosis: Decreased ADL tasks, transfers, safety and endurance  OT Education: OT Role;Plan of Care  Patient Education: Needs re-teaching  Barriers to Learning: Cognition  Activity Tolerance  Activity Tolerance: Patient limited by fatigue  Activity Tolerance: Pt with increased weakness this session         Patient Diagnosis(es): The primary encounter diagnosis was Altered mental status, unspecified altered mental status type. A diagnosis of General weakness was also pertinent to this visit.       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 has a past surgical history that includes Hysterectomy (2011) and other surgical history (10/01/2013). Restrictions  Position Activity Restriction  Other position/activity restrictions: up as rio prn       Diagnosis: AMS  Treatment Diagnosis: Decreased ADL tasks, transfers, safety and endurance    Subjective:   Pt met supine in bed. Pt cooperative but with decreased standing tolerance this session. Pain:   No pain noted    Objective:    Cognition/Orientation:  Impaired    Bed mobility   Rolling: Min  A rolling side to side multiple times for brief change and clean up  Supine to sit: CGA with slow effortful movement  Sit to Supine: Mod A for LE  Scooting: Mod A for lateral scooting on EOB    Functional Mobility   Sit to Stand: 4 attempts made with EOB raise for stance to RW. Pt unsuccessful for stand attempt this session. Pt stating \"maybe later\". ADLs   Grooming: CGA to Min A for oral care and washing face/hands with increased time and VCs. Pt beginning to wash face and then stopping with washcloth on face. Pt may have been falling asleep although difficult to assess. Bathing: Min A for UE washing seated on EOB  UB dressing: Min A donning gown seated EOB  Toileting: Pure wick place and pt clean up from large amounts of urine incontinence    Pt assisted to call daughter at end of session. Pt attempting to remember phone number but unable to remember. RN found number in chart.      Safety Devices in Place:  Pt left supine in bed with alarm on and

## 2021-05-18 NOTE — PROGRESS NOTES
Speech Language Pathology  Facility/Department: [facility] 5T ORTHO/NEURO  Dysphagia Daily Treatment Note    NAME: Nahed Rehman  : 1940  MRN: [id number]    Patient Diagnosis(es):   Patient Active Problem List    Diagnosis Date Noted    GI bleed 2013    Acute posthemorrhagic anemia 2013    Hypovolemia 2013    Iron deficiency 2013    Cerebral infarction ([address] 2013    Hemiparesis, left ([address] 2013    Essential hypertension 2008    AMS (altered mental status) 2021    Chest pain 2019    Hyperlipidemia 2019    DMII (diabetes mellitus, type 2) ([address] 2019    Morbid obesity due to excess calories ([address] 2019     Allergies: No Known Allergies    Recent Chest Xray: (2021)      Impression       Elevated right hemidiaphragm.       Clear lungs.       Stable top normal heart size.      Recent MRI Brain: (2021)      Impression       1.  Small acute bland infarct at the left frontoparietal junction. 2.  Chronic right frontal lobe infarct. 3.  Moderate chronic small vessel ischemic disease. 4.  No aneurysms, vascular occlusions, or intracranial stenoses identified. 5.  No significant stenosis in the extracranial vertebral or carotid arteries.      Previous MBS    Patient seen by this speech therapy department in , with a MBS completed at that time identifying aspiration of thin liquids via straw x 1. Subsequent diet recommendations at that time were for dental soft solids and nectar thick liquids and advance as pt tolerated    Chart reviewed. Medical Diagnosis: altered mental status  Treatment Diagnosis: dysphagia    BSE Impression 5/15/21  Suspect possible pharyngeal dysfunction; needs further assessment. Unable to complete oral motor exam d/t limited command following. Assessed tolerance ice chips, thins via tsp/cup, nectar thick liquid via cup, puree, soft solid.  Pt with positive oral acceptance, mildly prolonged oral prep, positive swallow movement/onset, adequate oral clearance. Single cough noted with thins via cup (attempted to complete 3 oz screen, but pt unable to follow directions). No overt s/s aspiration or penetration noted for soilds or nectar thick liquids. Suspect lethargy may be impacting performance. Given h/o aspiration, suggest previously recommended diet of dental soft solids, nectar thick liquids, no straw. Will continue to follow. MBS results - not indicated at this time    Pain: denies    Current Diet : dental soft/nectar 5/18- recommend upgrade to thin liquids- NO STRAWS     Treatment:  Pt seen bedside to address the following goals:  Goal 1: Patient will tolerate least restrictive diet without overt signs of aspiration. 5/17: pt sitting up in bed feeding self, appears improved from initial session. Pt consuming pancakes and scrambled eggs, with adequate mastication no pocketing noted. Pt consumed nectar and trials of thin via cup including 3 ounces of uninterrupted swallows, and single sips via straw, with no overt signs of aspiration. Consecutive swallows via straw resulted in mild cough reflex. No respiratory decline per chart review. Recommend upgrade to dental soft/thin -no straw and will monitor need for MBS  Cont goal  5/18-  RN reported no concerns re: swallowing or lung status. Pt analyzed with breakfast. Pt consumed pancakes and trials of water by cup and straw. Pt with intermittent throat clearing with pancakes- endorsed sensation of material feeling stuck- pt instructed to take sip of water to help clear material. With water by cup by successive swallows, no s/s aspiration observed. With trials of water by straw, delayed throat clearing noted. Recommend con't current diet with upgrading liquids to thin with no straws. con't goal     Goal 2: Patient/caregiver will demonstrate understanding of swallowing concerns/recommendations.   5/15: Provided education to patient regarding swallow function, dysphagia, aspiration concerns, previous MBS results, diet recommendations, importance of upright position, avoidance of straws, recommendation for repeat instrumental. Suspect limited comprehension, needing further reinforcement. Cont goal.  5/17: pt educated to purpose of visit, rationale for current diet and upgrade to thin liquids with no straw. Pt stated comprehension but will require reminder for no straw. Cont goal  5/18-  Pt was educated to the purpose of the visit, swallowing strategies, rational for strategies, diet recommendations and rational for upgrading liquids and plan to con't to monitor for need for MBS. Pt stated indicated comprehension but would benefit from reinforcement. con't goal     Goal 3: Patient will participate in repeat instrumental swallowing procedure as appropriate. 5/17: not indicated at this time  Cont goal as appropriate  5/18-  Not indicated at this time. con't goal as appropriate      Patient/Family/Caregiver Education:  As above    Compensatory Strategies:  90 degrees all meals  NO STRAW     Plan:  Continued daily Dysphagia treatment with goals per  plan of care. Diet recommendations: upgrade to dental soft/THIN liquids - NO STRAW  DC recommendation: tbd closer to dc  Treatment: 15  D/W nursing Kerry Valentino  Needs met prior to leaving room, call button in reach.     Christian Varner, [place]  Speech-Language Pathologist  Pager [phone]    If patient is discharged prior to next treatment, this note will serve as the discharge summary

## 2021-05-19 LAB
GLUCOSE BLD-MCNC: 177 MG/DL (ref 70–99)
GLUCOSE BLD-MCNC: 199 MG/DL (ref 70–99)
GLUCOSE BLD-MCNC: 260 MG/DL (ref 70–99)
GLUCOSE BLD-MCNC: 267 MG/DL (ref 70–99)
PERFORMED ON: ABNORMAL

## 2021-05-19 PROCEDURE — 97116 GAIT TRAINING THERAPY: CPT

## 2021-05-19 PROCEDURE — U0003 INFECTIOUS AGENT DETECTION BY NUCLEIC ACID (DNA OR RNA); SEVERE ACUTE RESPIRATORY SYNDROME CORONAVIRUS 2 (SARS-COV-2) (CORONAVIRUS DISEASE [COVID-19]), AMPLIFIED PROBE TECHNIQUE, MAKING USE OF HIGH THROUGHPUT TECHNOLOGIES AS DESCRIBED BY CMS-2020-01-R: HCPCS

## 2021-05-19 PROCEDURE — 6360000002 HC RX W HCPCS: Performed by: INTERNAL MEDICINE

## 2021-05-19 PROCEDURE — 97535 SELF CARE MNGMENT TRAINING: CPT

## 2021-05-19 PROCEDURE — 2580000003 HC RX 258: Performed by: INTERNAL MEDICINE

## 2021-05-19 PROCEDURE — 2060000000 HC ICU INTERMEDIATE R&B

## 2021-05-19 PROCEDURE — 97110 THERAPEUTIC EXERCISES: CPT

## 2021-05-19 PROCEDURE — 6370000000 HC RX 637 (ALT 250 FOR IP): Performed by: INTERNAL MEDICINE

## 2021-05-19 PROCEDURE — U0005 INFEC AGEN DETEC AMPLI PROBE: HCPCS

## 2021-05-19 PROCEDURE — 97530 THERAPEUTIC ACTIVITIES: CPT

## 2021-05-19 PROCEDURE — 6370000000 HC RX 637 (ALT 250 FOR IP): Performed by: PSYCHIATRY & NEUROLOGY

## 2021-05-19 RX ADMIN — METOPROLOL TARTRATE 25 MG: 25 TABLET, FILM COATED ORAL at 21:32

## 2021-05-19 RX ADMIN — INSULIN GLARGINE 15 UNITS: 100 INJECTION, SOLUTION SUBCUTANEOUS at 08:15

## 2021-05-19 RX ADMIN — INSULIN LISPRO 2 UNITS: 100 INJECTION, SOLUTION INTRAVENOUS; SUBCUTANEOUS at 08:16

## 2021-05-19 RX ADMIN — INSULIN LISPRO 6 UNITS: 100 INJECTION, SOLUTION INTRAVENOUS; SUBCUTANEOUS at 11:55

## 2021-05-19 RX ADMIN — CLOPIDOGREL BISULFATE 75 MG: 75 TABLET ORAL at 08:19

## 2021-05-19 RX ADMIN — Medication 10 ML: at 21:36

## 2021-05-19 RX ADMIN — INSULIN LISPRO 6 UNITS: 100 INJECTION, SOLUTION INTRAVENOUS; SUBCUTANEOUS at 17:31

## 2021-05-19 RX ADMIN — LOSARTAN POTASSIUM 50 MG: 25 TABLET, FILM COATED ORAL at 08:18

## 2021-05-19 RX ADMIN — METOPROLOL TARTRATE 25 MG: 25 TABLET, FILM COATED ORAL at 08:19

## 2021-05-19 RX ADMIN — FAMOTIDINE 20 MG: 20 TABLET, FILM COATED ORAL at 08:18

## 2021-05-19 RX ADMIN — TORSEMIDE 40 MG: 20 TABLET ORAL at 08:18

## 2021-05-19 RX ADMIN — INSULIN LISPRO 1 UNITS: 100 INJECTION, SOLUTION INTRAVENOUS; SUBCUTANEOUS at 21:35

## 2021-05-19 RX ADMIN — RISPERIDONE 2 MG: 1 TABLET ORAL at 21:33

## 2021-05-19 RX ADMIN — ENOXAPARIN SODIUM 40 MG: 40 INJECTION SUBCUTANEOUS at 08:20

## 2021-05-19 RX ADMIN — Medication 10 ML: at 08:19

## 2021-05-19 RX ADMIN — ATORVASTATIN CALCIUM 40 MG: 40 TABLET, FILM COATED ORAL at 21:33

## 2021-05-19 ASSESSMENT — PAIN SCALES - PAIN ASSESSMENT IN ADVANCED DEMENTIA (PAINAD)
BREATHING: 0
TOTALSCORE: 0
BODYLANGUAGE: 0
FACIALEXPRESSION: 0

## 2021-05-19 ASSESSMENT — PAIN SCALES - GENERAL: PAINLEVEL_OUTOF10: 0

## 2021-05-19 NOTE — PROGRESS NOTES
Physical Therapy  Daily Treatment Note    Discharge Recommendations: Gabby Montalvo scored a 16/24 on the AM-PAC short mobility form. Current research shows that an AM-PAC score of 17 or less is typically not associated with a discharge to the patient's home setting. Based on the patient's AM-PAC score and their current functional mobility deficits, it is recommended that the patient have 3-5 sessions per week of Physical Therapy at d/c to increase the patient's independence. Please see assessment section for further patient specific details. Assessment:  Pt with improved activity tolerance this session. Mobility slow and effortful. Decreased cognition noted at times, evidenced by perseveration and needing frequent cues (tactile, verbal) as well as demonstration of task. Pt is currently needing up to Mod assist x 1 for mobility at this time, which is below her baseline. Would benefit from continued IP PT at D/C prior to going home. Plan is for SNF. Equipment Needs: Defer to next level of care    Chart Reviewed: Yes     Other position/activity restrictions: up as rio prn   Additional Pertinent Hx: Adm 5/14 with change in ms & some R sided weakness noted earlier in day. History of schizophrenia, diabetes mellitus type 2, DVT w/ IVC filter, prior stroke with left weakness, and dementia. MRI brain:  Small acute bland infarct at the left frontoparietal junction; Chronic right frontal lobe infarct. Head CT:Age-related changes and old infarct without acute intracranial abnormality. Diagnosis: altered ms   Treatment Diagnosis: impaired mobility    Subjective: Pt in bed initially. Agreeable to working with PT. Soft spoken. Pt oriented to place. States it's \"June 22, 2026. \"    Pain: c/o L shoulder discomfort. States this is not new. \"It happens sometimes. \"    Objective:    Bed mobility  Supine to sit: Min assist x 1, HOB up partially with use of rail  Scooting: CGA to EOB    Transfers  Sit to stand: Min assist x 1 from raised height bed; Mod assist x 1 from chair; Mod assist x 1 from commode with grab bar. Cues for hand placement. Stand to sit: Min assist x 1 into chair (twice); Min assist x 1 onto commode with grab bar. Decreased eccentric control noted. Cues for hand placement. Bed > chair: Min assist with wheeled walker    Ambulation  Assistance Level: Min to [facility]  Assistive device: Wheeled walker  Distance: 2 ft (bed to chair); 16 ft, 5 ft, 18 ft in room. Seated rest between longer walks. Quality of gait: Step-to pattern; flexed posture; slow; weak; moderate reliance on walker for support; no overt LOB    Exercises  15 reps B LE seated ex: heel raises, toe raises, hip flexion, hip abd/add, LAQ  Other: Pt perseverating on previous exercise at times. Needing tactile cues and demonstration for quality of exercises. Balance  Sat EOB with SBA  Sat on commode 5+ minutes with SBA  Stood at sink ~ 5 minutes to wash/dry hands with CGA  Ambulation with wheeled walker Min to [facility]    Patient Education  Hand placement with transfers. Needs cues/reminders. Calling for assist with needs. Use of call button. Expressed understanding. Safety Devices  Pt left with needs in reach. In chair with chair alarm on. RN updated.      AM-PAC score  AM-PAC Inpatient Mobility Raw Score : 16  AM-PAC Inpatient T-Scale Score : 40.78  Mobility Inpatient CMS 0-100% Score: 54.16  Mobility Inpatient CMS G-Code Modifier : CK    Goals: (as determined and assessed by primary PT)  Time Frame for Short term goals: dc  Short term goal 1: Sit<>stand SBA   ongoing  Short term goal 2: Ambulate 36' with RW SBA   ongoing  Short term goal 3: up/down 4 steps with rail CGA  ongoing  Short term goal 4: Bed Mobility HOB flat with rail SBA  ongoing     Plan:  Times per week: 5-7;    Current Treatment Recommendations: Strengthening, Balance Training, Functional Mobility Training, Transfer Training, Gait Training, Patient/Caregiver Education &

## 2021-05-19 NOTE — PROGRESS NOTES
Hospitalist Progress Note      PCP: Rosa Gould    Chief Complaint. Patient is a 81-year-old female with past medical history of schizophrenia, diabetes mellitus, DVT status post IVC filter, dementia who presented to hospital due to altered mental status, generalized weakness. Patient is unable to provide reliable history due to multiple comorbid conditions, reportedly patient presented to the emergency department today as patient was noted to have change in mental status, generalized . According to the patient's home care nurse patient was noted to have right eye droop and generalized weakness, patient is usually more alert and awake however patient was found to be weak and not communicating much. Patient has history of previous CVA in the past.  Patient was recently treated for urinary tract infection on last admission. No reported fevers chills chest pain shortness of breath.     Date of Admission: 5/14/2021    Subjective: Patient seen and examined  More alert today  Working with physical therapy  No new complain    Medications:  Reviewed    Infusion Medications    dextrose      sodium chloride       Scheduled Medications    insulin glargine  15 Units Subcutaneous Daily    losartan  50 mg Oral Daily    torsemide  40 mg Oral Daily    clopidogrel  75 mg Oral Daily    famotidine  20 mg Oral Daily    metoprolol tartrate  25 mg Oral BID    risperiDONE  2 mg Oral Nightly    sodium chloride flush  10 mL Intravenous 2 times per day    enoxaparin  40 mg Subcutaneous Daily    atorvastatin  40 mg Oral Nightly    insulin lispro  0-12 Units Subcutaneous TID WC    insulin lispro  0-6 Units Subcutaneous Nightly     PRN Meds: glucose, dextrose, glucagon (rDNA), dextrose, sodium chloride flush, sodium chloride, promethazine **OR** ondansetron      Intake/Output Summary (Last 24 hours) at 5/19/2021 0943  Last data filed at 5/19/2021 0819  Gross per 24 hour   Intake 490 ml   Output 1200 ml   Net -710 ml       Physical Exam Performed:    BP (!) 160/84   Pulse 77   Temp 98.2 °F (36.8 °C) (Oral)   Resp 18   Ht 5' 6\" (1.676 m)   Wt 257 lb (116.6 kg)   SpO2 97%   BMI 41.48 kg/m²     General appearance: NAD, some what lethargic slow to respond, sitting n chair  HEENT:  Conjunctivae/corneas clear. Neck: Supple, with full range of motion. Respiratory:  Normal respiratory effort. Clear to auscultation, bilaterally without Rales/Wheezes/Rhonchi. Cardiovascular: Regular rate and rhythm with normal S1/S2 without murmurs or rubs  Abdomen: Soft, non-tender, non-distended, normal bowel sounds. Musculoskeletal: No cyanosis or edema bilaterally  Neurologic:  Seems to have R facial droop,  Psychiatric: Alert and oriented x 1, Normal mood  Peripheral Pulses: +2 palpable, equal bilaterally       Labs:   Recent Labs     05/18/21  1144   WBC 8.6   HGB 12.3   HCT 36.5        Recent Labs     05/18/21  1144      K 4.1      CO2 26   BUN 14   CREATININE 0.8   CALCIUM 10.6     No results for input(s): AST, ALT, BILIDIR, BILITOT, ALKPHOS in the last 72 hours. No results for input(s): INR in the last 72 hours. No results for input(s): Enrique Cobble in the last 72 hours. Urinalysis:      Lab Results   Component Value Date    NITRU Negative 05/14/2021    WBCUA 21-50 04/15/2021    BACTERIA 4+ 04/15/2021    RBCUA 3-4 04/15/2021    BLOODU Negative 05/14/2021    SPECGRAV 1.020 05/14/2021    GLUCOSEU Negative 05/14/2021       Radiology:  VL Extremity Venous Bilateral   Final Result      NM PARATHYORID W SPECT   Final Result      There are no scintigraphic findings to identify or localize a parathyroid adenoma. CT HEAD WO CONTRAST   Final Result      1. Age-related atrophy with patchy periventricular white matter changes bilaterally consistent with chronic small vessel ischemia. 3.  Stable right frontal lobe encephalomalacia consistent with remote infarct.   Overall no significant interval change performed-shows CVA, chest x-ray negative for consolidation, repeat Ct head  UA-unremarkable  Consult PT/OT/speech therapy/neurology  On aspirin and plavix    2. Generalized weakness    3. Diabetes mellitus, type 2  Resume home aspirin, statin  Insulin sliding scale    4. Schizophrenia    5. DVT status post IVC filter, patient was on Coumadin and Xarelto at one-point which was stopped due to GI bleed in 2018, per chart review     DVT prophylaxis-Lovenox  Diet: ordered  Code Status: full     PT/OT Eval Status: ordered     Dispo - pending clinical improvement  Diet: DIET DENTAL SOFT; Carb Control: 3 carb choices (45 gms)/meal; Low Sodium (2 GM);  Dental Soft  Code Status: Full Code    PT/OT Eval Status: ordered    Dispo -close to discharge, needs pre-CERT and SNF    Judah King MD

## 2021-05-19 NOTE — PROGRESS NOTES
Up with therapy and ambulated to bathroom to urinate with walker. Gait was fairly stedy. Denies pain at this time.

## 2021-05-19 NOTE — CARE COORDINATION
Case Management Daily Note                    Date: 5/19/2021     Patient Name: Bear De Oliveira    Date of Admission: 5/14/2021 11:40 AM  YOB: 1940    Length of Stay: 5  GMLOS: 2.9         Patient Admission Status: Inpatient  Diagnosis:AMS (altered mental status) [R41.82]     ________________________________________________________________________________________  Discharge Plan: SNF:  [facility] [phone]   E-Mail: [email or web address]     Insurance: Payor: Jena [place] / Plan: Alyse Lucia PPO / Product Type: Medicare /   Is pre-cert/notification needed: Yes     Tentative discharge date: 5/21    Current barriers: Placement needed, List provided today. Referrals completed: Not Applicable    Resources/ information provided: Not indicated at this time   ________________________________________________________________________________________  PT AM-PAC: 15 / 24 per last evaluation on: 5/17    OT AM-PAC: 14 / 24 per last evaluation on: 5/17    DME Needs for discharge: defer  ________________________________________________________________________________________  Notes/Plan of Care:   NICOLE received a response form Christopher, requesting a referral to [address] at [place]. NICOLE placed referral in Epic and placed call to Shruti in admissions ([phone]) SW awaiting a return call. UPDATE: 10:46 AM NICOLE spoke with Shruti at [address] She said they likely can accept patient, but would like to review full chart before giving final decision. If they can accept, will initiate pre-cert today. UPDATE: 4:29 pm [facility] started pre-cert this afternoon. NICOLE placed call to Donald Nick and provided update     Franci Dash and/or her family were provided with choice of provider; she and/or her family are in agreement with the discharge plan at this time.     Care Transition Patient: No    Amberly Lindo, DWAIN  [facility] - [address]   Case Management Department  Ph: [phone]

## 2021-05-19 NOTE — PROGRESS NOTES
Occupational Therapy  Facility/Department: [facility] 5T ORTHO/NEURO  Daily Treatment Note  NAME: Lizbeth Caceres  : 1940  MRN: [id number]    Date of Service: 2021    Discharge Recommendations: Lizbeth Ccaeres scored a 16/24 on the AM-PAC ADL Inpatient form. Current research shows that an AM-PAC score of 17 or less is typically not associated with a discharge to the patient's home setting. Based on the patient's AM-PAC score and their current ADL deficits, it is recommended that the patient have 3-5 sessions per week of Occupational Therapy at d/c to increase the patient's independence. Please see assessment section for further patient specific details. If patient discharges prior to next session this note will serve as a discharge summary. Please see below for the latest assessment towards goals. Assessment   Assessment: Pt with increased activity tolerance this session completing functional mobiltiy and transfers with CGA to Min A. Pt requiring increased time for processing all tasks. UE exercise completed seated in recliner chair. pt would benefit from inpt OT for maximizing functional independence and safety. Continue OT per POC. Treatment Diagnosis: Decreased ADL tasks, transfers, safety and endurance  OT Education: OT Role;Plan of Care  Patient Education: safe transfers, will need reinforcment  Barriers to Learning: Cognition  Activity Tolerance  Activity Tolerance: Patient Tolerated treatment well         Patient Diagnosis(es): The primary encounter diagnosis was Altered mental status, unspecified altered mental status type. A diagnosis of General weakness was also pertinent to this visit.       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 has a past surgical history that includes Hysterectomy (2011) and other surgical history (10/01/2013). Restrictions  Position Activity Restriction  Other position/activity restrictions: up as rio prn   Diagnosis: AMS  Treatment Diagnosis: Decreased ADL tasks, transfers, safety and endurance    Subjective:   Pt met seated in recliner chair and agreeable to OT treatment        Pain:   Pain in R shoulder with movement. RN aware      Objective:    Cognition/Orientation:  Impaired    Functional Mobility   Sit to Stand: CGA with effortful movement. Pt completing 3 sit to stands from recliner chair with Min A initially then progressing to [facility]. Stand to Sit: CGA with vCs for slowing descent  Chair Transfer:  CGA to Min A with VCs for hand placment  Commode Transfer: CGA with use of GB  Other: Functional mobility demonstrated to and from bathroom with RW and CGA    ADLs   Grooming: CGA for washing hand stance at sink. Toileting: Min A for clothing management and TP managment    UE Exercises   10 reps forward rowing  5 sit to stands  10 reps trunk anterior and posterior lean with therapist resistance  Red foam thera cube provided for RUE  strengthening. 10 reps completed for grasp release with heavy VCs and demo for release. Pt does better when asked to put cube in therapist hand. Safety Devices in Place:  Pt left seated in recliner chair at end of session with alarm on and call light inreach.         Plan  If pt discharges prior to next treatment, this note will serve as discharge summary  Plan  Times per week: 5-7x  Times per day: Daily AM-PAC Score        AM-PAC Inpatient Daily Activity Raw Score: 16 (05/19/21 1146)  AM-PAC Inpatient ADL T-Scale Score : 35.96 (05/19/21 1146)  ADL Inpatient CMS 0-100% Score: 53.32 (05/19/21 1146)  ADL Inpatient CMS G-Code Modifier : CK (05/19/21 1146)    Goals (as determined and assessed by primary OT)  Short term goals  Time Frame for Short term goals: discharge  Short term goal 1: Complete toileting transfer with CGA - ongoing  Short term goal 2: Complete ADL in stance x10 minutes with CGA - ongoing  Short term goal 3: Completeing grooming tasks with setup - ongoing  Patient Goals   Patient goals : unable to state       Therapy Time   Individual Concurrent Group Co-treatment   Time In 0947         Time Out 1025         Minutes 38         Timed Code Treatment Minutes: KAMILLA Enriquez 46

## 2021-05-19 NOTE — PROGRESS NOTES
Speech Language Pathology    Reviewed chart. Spoke with Rn, who reports pt has been tolerating current diet (Dental soft, thin liquids, no straw) well. Attempted to work with pt, however pt too somnolent to participate, despite cues. Will hold and plan to follow-up at later time/date as able/appropriate.     Jeanne Villalobos M.A., [address] [facility].[id number]  Speech-Language Pathologist  Pager: [phone]

## 2021-05-19 NOTE — PROGRESS NOTES
[facility]    [facility]. [facility]              [phone]                          Interval History and plan:      PTH is 98 ( elevated ). greta has primary hyperparathyroidism   Vitamin D is normal 41  Calcium is slightly better at 10.6  PTH SPECT scan for negative adenoma    free light chain ratio was 1.9. Stop IVF                     Assessment :     Hypercalcemia: She has chronic mild hypercalcemia. She presented with a calcium of 11.2. CT abdomen in the past chest showed small left renal calculus. At home she takes torsemide. It appears that she might have primary hyperparathyroidism. Continue with IV fluid. Urinalysis is normal.      [facility] would like to thank Andreea Bartlett MD   for opportunity to serve this patient      Please call with questions at-   24 Hrs Answering service [phone] or  7 am- 5 pm via Perfect serve or cell phone  Ev Sood MD          CC/reason for consult :     Hypercalcemia     HPI :     Lakeisha Sullivan is a 68 y.o. female presented to   the hospital on 5/14/2021 with weakness. She has past medical history of PE, schizophrenia, CVA, urinary incontinence, dementia, history of hemiparesis post CVA, hypertension and diabetes. She presented to ER with change in mental status and generalized weakness. As per the home care nurse patient was noted to have right eye droop and generalized weakness. Since she had previous CVAs in the past she was brought into the ER. She recently had UTI from Klebsiella. It is worth mentioning patient cannot give me any history. Most of the history was obtained by reviewing the chart and speaking to the staff. When she arrived calcium was 11.2 with a normal creatinine. I was called for further management of hypercalcemia.     ROS:     Seen with-no family in the room    positives in bold   Cannot be obtained                     All other remaining systems are negative or unable to obtain        PMH/PSH/SH/Family History:     Past Medical History:   Diagnosis Date    Abnormal glucose     Acute pulmonary embolism ([facility] [address] 9/13/2013    Allergic rhinitis, cause unspecified 5/7/2012    Constipation     CVA (cerebral infarction)     Dementia ([facility] [address]     DVT (deep venous thrombosis) (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 [address]     Unspecified cerebral artery occlusion with cerebral infarction     Unspecified urinary incontinence 10/17/2012    Urinary retention     Venous thrombosis        Past Surgical History:   Procedure Laterality Date    HYSTERECTOMY  2011    [facility] for abnormal uterine bleeding    OTHER SURGICAL HISTORY  10/01/2013    EUS, EGD        reports that she has never smoked. She has never used smokeless tobacco. She reports that she does not drink alcohol and does not use drugs. family history is not on file.          Medication:     Current Facility-Administered Medications: insulin glargine (LANTUS;BASAGLAR) injection pen 15 Units, 15 Units, Subcutaneous, Daily  losartan (COZAAR) tablet 50 mg, 50 mg, Oral, Daily  torsemide (DEMADEX) tablet 40 mg, 40 mg, Oral, Daily  clopidogrel (PLAVIX) tablet 75 mg, 75 mg, Oral, Daily  famotidine (PEPCID) tablet 20 mg, 20 mg, Oral, Daily  metoprolol tartrate (LOPRESSOR) tablet 25 mg, 25 mg, Oral, BID  risperiDONE (RISPERDAL) tablet 2 mg, 2 mg, Oral, Nightly  glucose (GLUTOSE) 40 % oral gel 15 g, 15 g, Oral, PRN  dextrose 50 % IV solution, 12.5 g, Intravenous, PRN  glucagon (rDNA) injection 1 mg, 1 mg, Intramuscular, PRN  dextrose 5 % solution, 100 mL/hr, Intravenous, PRN  sodium chloride flush 0.9 % injection 10 mL, 10 mL, Intravenous, 2 times per day  sodium chloride flush 0.9 % injection 10 mL, 10 mL, Intravenous, PRN  0.9 % sodium chloride infusion, 25 mL, Intravenous, PRN  promethazine (PHENERGAN) tablet 12.5 mg, 12.5 mg, Oral, Q6H PRN **OR** ondansetron (ZOFRAN) injection 4 mg, 4 mg, Intravenous, Q6H PRN  enoxaparin (LOVENOX) injection 40 mg, 40 mg, Subcutaneous, Daily  atorvastatin (LIPITOR) tablet 40 mg, 40 mg, Oral, Nightly  insulin lispro (1 Unit Dial) 0-12 Units, 0-12 Units, Subcutaneous, TID WC  insulin lispro (1 Unit Dial) 0-6 Units, 0-6 Units, Subcutaneous, Nightly       Vitals :     Vitals:    05/19/21 0714   BP: (!) 160/84   Pulse: 77   Resp: 18   Temp: 98.2 °F (36.8 °C)   SpO2: 97%       I & O :       Intake/Output Summary (Last 24 hours) at 5/19/2021 0814  Last data filed at 5/19/2021 0800  Gross per 24 hour   Intake 730 ml   Output 1700 ml   Net -970 ml        Physical Examination :     General appearance: Awake but confused. HEENT: Lips- normal, teeth- ok , oral mucosa- moist  Neck : Mass- no, appears symmetrical, JVD- not visible  Respiratory: Respiratory effort-okay, wheeze- no, crackles -none  Cardiovascular:  Ausculation- No M/R/G, Edema none  Abdomen: visible mass- no, distention- no, scar- no, tenderness- no                            hepatosplenomegaly-  no  Musculoskeletal:  generalized bilateral weakness  Skin: rashes- no , ulcers- no, induration- no, tightening - no  Psychiatric: Not evaluated     LABS:     Recent Labs     05/18/21  1144   WBC 8.6   HGB 12.3   HCT 36.5        Recent Labs     05/18/21  1144      K 4.1      CO2 26   BUN 14   CREATININE 0.8   GLUCOSE 305*      Nephrology  [facility], [address]  Office: [phone]  Fax: [phone]

## 2021-05-19 NOTE — PLAN OF CARE
Problem: Pain:  Goal: Patient's pain/discomfort is manageable  Description: Patient's pain/discomfort is manageable  5/19/2021 0722 by Guanaco Theodore RN  Outcome: Ongoing  5/18/2021 1953 by Shey Pineda RN  Outcome: Ongoing   Denies pain at this time.

## 2021-05-20 ENCOUNTER — APPOINTMENT (OUTPATIENT)
Dept: CT IMAGING | Age: 81
DRG: 064 | End: 2021-05-20
Payer: MEDICARE

## 2021-05-20 LAB
GLUCOSE BLD-MCNC: 163 MG/DL (ref 70–99)
GLUCOSE BLD-MCNC: 221 MG/DL (ref 70–99)
GLUCOSE BLD-MCNC: 267 MG/DL (ref 70–99)
GLUCOSE BLD-MCNC: 300 MG/DL (ref 70–99)
PERFORMED ON: ABNORMAL
SARS-COV-2: NOT DETECTED

## 2021-05-20 PROCEDURE — 2060000000 HC ICU INTERMEDIATE R&B

## 2021-05-20 PROCEDURE — 92526 ORAL FUNCTION THERAPY: CPT

## 2021-05-20 PROCEDURE — 6370000000 HC RX 637 (ALT 250 FOR IP): Performed by: INTERNAL MEDICINE

## 2021-05-20 PROCEDURE — 97116 GAIT TRAINING THERAPY: CPT

## 2021-05-20 PROCEDURE — 97535 SELF CARE MNGMENT TRAINING: CPT

## 2021-05-20 PROCEDURE — 97530 THERAPEUTIC ACTIVITIES: CPT

## 2021-05-20 PROCEDURE — 6370000000 HC RX 637 (ALT 250 FOR IP): Performed by: PSYCHIATRY & NEUROLOGY

## 2021-05-20 PROCEDURE — 6360000002 HC RX W HCPCS: Performed by: INTERNAL MEDICINE

## 2021-05-20 PROCEDURE — 70450 CT HEAD/BRAIN W/O DYE: CPT

## 2021-05-20 PROCEDURE — 2580000003 HC RX 258: Performed by: INTERNAL MEDICINE

## 2021-05-20 PROCEDURE — 93005 ELECTROCARDIOGRAM TRACING: CPT | Performed by: INTERNAL MEDICINE

## 2021-05-20 PROCEDURE — 97110 THERAPEUTIC EXERCISES: CPT

## 2021-05-20 RX ORDER — ASPIRIN 81 MG/1
324 TABLET, CHEWABLE ORAL ONCE
Status: COMPLETED | OUTPATIENT
Start: 2021-05-20 | End: 2021-05-20

## 2021-05-20 RX ORDER — INSULIN LISPRO 100 [IU]/ML
0-9 INJECTION, SOLUTION INTRAVENOUS; SUBCUTANEOUS NIGHTLY
Status: DISCONTINUED | OUTPATIENT
Start: 2021-05-20 | End: 2021-05-21 | Stop reason: HOSPADM

## 2021-05-20 RX ORDER — ALOGLIPTIN 25 MG/1
25 TABLET, FILM COATED ORAL DAILY
Status: DISCONTINUED | OUTPATIENT
Start: 2021-05-20 | End: 2021-05-21 | Stop reason: HOSPADM

## 2021-05-20 RX ORDER — INSULIN LISPRO 100 [IU]/ML
0-18 INJECTION, SOLUTION INTRAVENOUS; SUBCUTANEOUS
Status: DISCONTINUED | OUTPATIENT
Start: 2021-05-20 | End: 2021-05-21 | Stop reason: HOSPADM

## 2021-05-20 RX ADMIN — INSULIN GLARGINE 15 UNITS: 100 INJECTION, SOLUTION SUBCUTANEOUS at 07:19

## 2021-05-20 RX ADMIN — METOPROLOL TARTRATE 25 MG: 25 TABLET, FILM COATED ORAL at 21:55

## 2021-05-20 RX ADMIN — ASPIRIN 324 MG: 81 TABLET, CHEWABLE ORAL at 22:57

## 2021-05-20 RX ADMIN — CLOPIDOGREL BISULFATE 75 MG: 75 TABLET ORAL at 09:40

## 2021-05-20 RX ADMIN — INSULIN LISPRO 4 UNITS: 100 INJECTION, SOLUTION INTRAVENOUS; SUBCUTANEOUS at 07:20

## 2021-05-20 RX ADMIN — RISPERIDONE 2 MG: 1 TABLET ORAL at 21:54

## 2021-05-20 RX ADMIN — INSULIN LISPRO 2 UNITS: 100 INJECTION, SOLUTION INTRAVENOUS; SUBCUTANEOUS at 21:56

## 2021-05-20 RX ADMIN — ENOXAPARIN SODIUM 40 MG: 40 INJECTION SUBCUTANEOUS at 09:40

## 2021-05-20 RX ADMIN — ALOGLIPTIN 25 MG: 25 TABLET, FILM COATED ORAL at 16:24

## 2021-05-20 RX ADMIN — LOSARTAN POTASSIUM 50 MG: 25 TABLET, FILM COATED ORAL at 09:40

## 2021-05-20 RX ADMIN — ATORVASTATIN CALCIUM 40 MG: 40 TABLET, FILM COATED ORAL at 21:55

## 2021-05-20 RX ADMIN — Medication 10 ML: at 09:42

## 2021-05-20 RX ADMIN — INSULIN LISPRO 9 UNITS: 100 INJECTION, SOLUTION INTRAVENOUS; SUBCUTANEOUS at 18:01

## 2021-05-20 RX ADMIN — INSULIN LISPRO 8 UNITS: 100 INJECTION, SOLUTION INTRAVENOUS; SUBCUTANEOUS at 12:01

## 2021-05-20 RX ADMIN — FAMOTIDINE 20 MG: 20 TABLET, FILM COATED ORAL at 09:40

## 2021-05-20 RX ADMIN — TORSEMIDE 40 MG: 20 TABLET ORAL at 09:40

## 2021-05-20 RX ADMIN — METOPROLOL TARTRATE 25 MG: 25 TABLET, FILM COATED ORAL at 09:40

## 2021-05-20 ASSESSMENT — PAIN SCALES - PAIN ASSESSMENT IN ADVANCED DEMENTIA (PAINAD)
BODYLANGUAGE: 0
FACIALEXPRESSION: 0
TOTALSCORE: 0
BREATHING: 0
NEGVOCALIZATION: 0

## 2021-05-20 ASSESSMENT — PAIN SCALES - GENERAL: PAINLEVEL_OUTOF10: 0

## 2021-05-20 NOTE — CARE COORDINATION
Case Management Daily Note                    Date: 5/20/2021     Patient Name: Bobbi Avila    Date of Admission: 5/14/2021 11:40 AM  YOB: 1940    Length of Stay: 6  GMLOS: 2.9         Patient Admission Status: Inpatient  Diagnosis:AMS (altered mental status) [R41.82]     ________________________________________________________________________________________  Discharge Plan: SNF:    [facility] at [address] [place] [address]  Report: [phone]   Fax: Brynradha Solorio: [phone]    Cherise Echavarria [phone]   E-Mail: [email or web address]     Insurance: Payor: Danny Pearl / Plan: Jerome CHÁVEZ / Product Type: Medicare /   Is pre-cert/notification needed: Yes     Tentative discharge date: 5/21    Current barriers: Placement needed, List provided today. Referrals completed: Not Applicable    Resources/ information provided: Not indicated at this time   ________________________________________________________________________________________  PT AM-PAC: 16 / 24 per last evaluation on: 5/17    OT AM-PAC: 15 / 24 per last evaluation on: 5/17    DME Needs for discharge: defer  ________________________________________________________________________________________  Notes/Plan of Care:   NICOLE spoke spoke with admissions at [facility] [place] [address] times today. They are checking for updates from [address] [place] [address]  each hour and will update this NICOLE.     SW completed HENS in anticipation of discharge. [place] and/or her family were provided with choice of provider; she and/or her family are in agreement with the discharge plan at this time.     Care Transition Patient: DWAIN Rosario  [facility]   Case Management Department  Ph: [phone]

## 2021-05-20 NOTE — PROGRESS NOTES
mental status type. A diagnosis of General weakness was also pertinent to this visit. 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 has a past surgical history that includes Hysterectomy (2011) and other surgical history (10/01/2013). Restrictions  Position Activity Restriction  Other position/activity restrictions: up as rio prn  Subjective   General  Chart Reviewed: Yes  Patient assessed for rehabilitation services?: Yes  Additional Pertinent Hx: 68 y.o. female who presented with small left subcortical stroke in the setting of dementia and previous right sided stroke and significant ischemic white matter change; MRI:1.  Small acute bland infarct at the left frontoparietal junction. 2. Chronic right frontal lobe infarct. 3. Moderate chronic small vessel ischemic disease. Family / Caregiver Present: No  Referring Practitioner: Shin  Diagnosis: AMS  Subjective  Subjective: Patient in bed upon arrival, agreeable to OT services. Vital Signs  Patient Currently in Pain: Denies     Objective    ADL  Grooming: Contact guard assistance (wash hands in stance at sink, forearm weightbearing for increased support)  UE Bathing: Setup;Stand by assistance (seated on commode)  UE Dressing: Minimal assistance; Increased time to complete;Verbal cueing (tactile cues to initiate, increased time to complete)  LE Dressing: ADL in stance x10 minutes with CGA - not  met, ongoing  Short term goal 3: Completeing grooming tasks with setup - ongoing  Patient Goals   Patient goals : unable to state       Therapy Time   Individual Concurrent Group Co-treatment   Time In 1004         Time Out 1051         Minutes 63 Anna Bates OTR/L #[id number]

## 2021-05-20 NOTE — PROGRESS NOTES
Speech Language Pathology  Facility/Department: [facility] 5T ORTHO/NEURO  Dysphagia Daily Treatment Note/DC    NAME: Terrence Clark  : 1940  MRN: [id number]    Patient Diagnosis(es):   Patient Active Problem List    Diagnosis Date Noted    GI bleed 2013    Acute posthemorrhagic anemia 2013    Hypovolemia 2013    Iron deficiency 2013    Cerebral infarction ([address] 2013    Hemiparesis, left ([address] 2013    Essential hypertension 2008    AMS (altered mental status) 2021    Chest pain 2019    Hyperlipidemia 2019    DMII (diabetes mellitus, type 2) ([address] 2019    Morbid obesity due to excess calories ([facility] 2019     Allergies: No Known Allergies    Recent Chest Xray: (2021)      Impression       Elevated right hemidiaphragm.       Clear lungs.       Stable top normal heart size.      Recent MRI Brain: (2021)      Impression       1.  Small acute bland infarct at the left frontoparietal junction. 2.  Chronic right frontal lobe infarct. 3.  Moderate chronic small vessel ischemic disease. 4.  No aneurysms, vascular occlusions, or intracranial stenoses identified. 5.  No significant stenosis in the extracranial vertebral or carotid arteries.      Previous MBS    Patient seen by this speech therapy department in , with a MBS completed at that time identifying aspiration of thin liquids via straw x 1. Subsequent diet recommendations at that time were for dental soft solids and nectar thick liquids and advance as pt tolerated    Chart reviewed. Medical Diagnosis: altered mental status  Treatment Diagnosis: dysphagia    BSE Impression 5/15/21  Suspect possible pharyngeal dysfunction; needs further assessment. Unable to complete oral motor exam d/t limited command following. Assessed tolerance ice chips, thins via tsp/cup, nectar thick liquid via cup, puree, soft solid.  Pt with positive oral acceptance, mildly soft texture, no pocketing / residue noted. No overt signs of aspiration with thin liquids via cup, no cough or change in voice. No respiratory decline per chart review  Goal met  Goal 2: Patient/caregiver will demonstrate understanding of swallowing concerns/recommendations. 5/15: Provided education to patient regarding swallow function, dysphagia, aspiration concerns, previous MBS results, diet recommendations, importance of upright position, avoidance of straws, recommendation for repeat instrumental. Suspect limited comprehension, needing further reinforcement. Cont goal.  5/17: pt educated to purpose of visit, rationale for current diet and upgrade to thin liquids with no straw. Pt stated comprehension but will require reminder for no straw. Cont goal  5/18-  Pt was educated to the purpose of the visit, swallowing strategies, rational for strategies, diet recommendations and rational for upgrading liquids and plan to con't to monitor for need for MBS. Pt stated indicated comprehension but would benefit from reinforcement. con't goal   5/20: pt educated to purpose of visit, reviewed s/s of aspiration and reinforcement  for no straws. Pt was able state she should not use a straw, prior to education. Pt agreeable for current texture, eating softer solids prior to admit  Goal met  Goal 3: Patient will participate in repeat instrumental swallowing procedure as appropriate. 5/17: not indicated at this time  Cont goal as appropriate  5/18-  Not indicated at this time. con't goal as appropriate   5/20: not indicated- goal dc    Patient/Family/Caregiver Education:  As above    Compensatory Strategies:  90 degrees all meals  NO STRAW     Plan:    Diet recommendations:  dental soft/THIN liquids - NO STRAW  DC recommendation: no follow up indicated  Treatment: 12  D/W kurtis Tiwari prior to session  Needs met prior to leaving room, call button in reach. Breanna Godwin M.S./[facility]3]-SLP #[id number]  Pg.  # [phone]

## 2021-05-20 NOTE — PLAN OF CARE
Problem: Confusion - Acute:  Goal: Mental status will be restored to baseline  Description: Mental status will be restored to baseline  Outcome: Ongoing  Note: Patient is alert only to self. She is disoriented and not easily reoriented to place, time or situation. Problem: Discharge Planning:  Goal: Ability to perform activities of daily living will improve  Description: Ability to perform activities of daily living will improve  Outcome: Met This Shift  Note: Patient is able to feed herself with no issues. Problem: Mood - Altered:  Goal: Mood stable  Description: Mood stable  Outcome: Met This Shift  Note: Patient has been calm and cooperative this shift. Problem: Sleep Pattern Disturbance:  Goal: Appears well-rested  Description: Appears well-rested  Outcome: Met This Shift  Note: Patient has rested adequately this shift.

## 2021-05-20 NOTE — PROGRESS NOTES
[facility]    [facility]              [phone]                          Interval History and plan:      PTH is 98 ( elevated ). greta has primary hyperparathyroidism   Vitamin D is normal 41  Calcium is slightly better at 10.6  PTH SPECT scan for negative adenoma    free light chain ratio was 1.9. Stop IVF                     Assessment :     Hypercalcemia: She has chronic mild hypercalcemia. She presented with a calcium of 11.2. CT abdomen in the past chest showed small left renal calculus. At home she takes torsemide. It appears that she might have primary hyperparathyroidism. Continue with IV fluid. Urinalysis is normal.      [facility] would like to thank Ami Glover MD   for opportunity to serve this patient      Please call with questions at-   24 Hrs Answering service [phone] or  7 am- 5 pm via Perfect serve or cell phone  John Rangel MD          CC/reason for consult :     Hypercalcemia     HPI :     Michelle Goff is a 68 y.o. female presented to   the hospital on 5/14/2021 with weakness. She has past medical history of PE, schizophrenia, CVA, urinary incontinence, dementia, history of hemiparesis post CVA, hypertension and diabetes. She presented to ER with change in mental status and generalized weakness. As per the home care nurse patient was noted to have right eye droop and generalized weakness. Since she had previous CVAs in the past she was brought into the ER. She recently had UTI from Klebsiella. It is worth mentioning patient cannot give me any history. Most of the history was obtained by reviewing the chart and speaking to the staff. When she arrived calcium was 11.2 with a normal creatinine. I was called for further management of hypercalcemia.     ROS:     Seen with-no family in the room    positives in bold   Cannot be obtained                     All other remaining systems are negative or unable to obtain        PMH/PSH/SH/Family History:     Past Medical History:   Diagnosis Date    Abnormal glucose     Acute pulmonary embolism ([facility] 9/13/2013    Allergic rhinitis, cause unspecified 5/7/2012    Constipation     CVA (cerebral infarction)     Dementia ([facility]     DVT (deep venous thrombosis) ([facility] 2007 & 2011    Hemiparesis ([address]     HTN (hypertension)     Hyperlipidemia     Lumbago 10/24/2008    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 ([address]     Other abnormal glucose 7/21/2011    PE (pulmonary embolism)     Personal history of schizophrenia 5/1/2008    Personal history of venous thrombosis and embolism 6/2/2008    S/P hysterectomy     Schizophrenia ([facility]     Unspecified cerebral artery occlusion with cerebral infarction     Unspecified urinary incontinence 10/17/2012    Urinary retention     Venous thrombosis        Past Surgical History:   Procedure Laterality Date    HYSTERECTOMY  2011    [facility] for abnormal uterine bleeding    OTHER SURGICAL HISTORY  10/01/2013    EUS, EGD        reports that she has never smoked. She has never used smokeless tobacco. She reports that she does not drink alcohol and does not use drugs. family history is not on file.          Medication:     Current Facility-Administered Medications: insulin glargine (LANTUS;BASAGLAR) injection pen 15 Units, 15 Units, Subcutaneous, Daily  losartan (COZAAR) tablet 50 mg, 50 mg, Oral, Daily  torsemide (DEMADEX) tablet 40 mg, 40 mg, Oral, Daily  clopidogrel (PLAVIX) tablet 75 mg, 75 mg, Oral, Daily  famotidine (PEPCID) tablet 20 mg, 20 mg, Oral, Daily  metoprolol tartrate (LOPRESSOR) tablet 25 mg, 25 mg, Oral, BID  risperiDONE (RISPERDAL) tablet 2 mg, 2 mg, Oral, Nightly  glucose (GLUTOSE) 40 % oral gel 15 g, 15 g, Oral, PRN  dextrose 50 % IV solution, 12.5 g, Intravenous, PRN  glucagon (rDNA) injection 1 mg, 1 mg, Intramuscular, PRN  dextrose 5 % solution, 100 mL/hr, Intravenous, PRN  sodium chloride flush 0.9 % injection 10 mL, 10 mL, Intravenous, 2 times per day  sodium chloride flush 0.9 % injection 10 mL, 10 mL, Intravenous, PRN  0.9 % sodium chloride infusion, 25 mL, Intravenous, PRN  promethazine (PHENERGAN) tablet 12.5 mg, 12.5 mg, Oral, Q6H PRN **OR** ondansetron (ZOFRAN) injection 4 mg, 4 mg, Intravenous, Q6H PRN  enoxaparin (LOVENOX) injection 40 mg, 40 mg, Subcutaneous, Daily  atorvastatin (LIPITOR) tablet 40 mg, 40 mg, Oral, Nightly  insulin lispro (1 Unit Dial) 0-12 Units, 0-12 Units, Subcutaneous, TID WC  insulin lispro (1 Unit Dial) 0-6 Units, 0-6 Units, Subcutaneous, Nightly       Vitals :     Vitals:    05/20/21 0715   BP: 134/83   Pulse: 61   Resp: 18   Temp: 97.6 °F (36.4 °C)   SpO2: 98%       I & O :       Intake/Output Summary (Last 24 hours) at 5/20/2021 0851  Last data filed at 5/20/2021 0600  Gross per 24 hour   Intake 760 ml   Output 850 ml   Net -90 ml        Physical Examination :     General appearance: Awake but confused. HEENT: Lips- normal, teeth- ok , oral mucosa- moist  Neck : Mass- no, appears symmetrical, JVD- not visible  Respiratory: Respiratory effort-okay, wheeze- no, crackles -none  Cardiovascular:  Ausculation- No M/R/G, Edema none  Abdomen: visible mass- no, distention- no, scar- no, tenderness- no                            hepatosplenomegaly-  no  Musculoskeletal:  generalized bilateral weakness  Skin: rashes- no , ulcers- no, induration- no, tightening - no  Psychiatric: Not evaluated     LABS:     Recent Labs     05/18/21  1144   WBC 8.6   HGB 12.3   HCT 36.5        Recent Labs     05/18/21  1144      K 4.1      CO2 26   BUN 14   CREATININE 0.8   GLUCOSE 305*      Nephrology  [address] [place] [address], [facility]  Office: [phone]  Fax: [phone]

## 2021-05-20 NOTE — PROGRESS NOTES
Physical Therapy  Daily Treatment Note    Discharge Recommendations: Lizbeth Caceres scored a 16/24 on the AM-PAC short mobility form. Current research shows that an AM-PAC score of 17 or less is typically not associated with a discharge to the patient's home setting. Based on the patient's AM-PAC score and their current functional mobility deficits, it is recommended that the patient have 3-5 sessions per week of Physical Therapy at d/c to increase the patient's independence. Please see assessment section for further patient specific details. Assessment:  Pt with improved activity tolerance this session. Mobility slow and effortful. Needs occasional rest breaks. Pt with good participation for therapy. She is currently functioning below baseline for mobility. Would benefit from continued IP PT at D/C prior to going home. Plan is for SNF. Equipment Needs: Defer to next level of care    Chart Reviewed: Yes     Other position/activity restrictions: up as rio prn   Additional Pertinent Hx: Adm 5/14 with change in ms & some R sided weakness noted earlier in day. History of schizophrenia, diabetes mellitus type 2, DVT w/ IVC filter, prior stroke with left weakness, and dementia. MRI brain:  Small acute bland infarct at the left frontoparietal junction; Chronic right frontal lobe infarct. Head CT:Age-related changes and old infarct without acute intracranial abnormality. Diagnosis: altered ms   Treatment Diagnosis: impaired mobility    Subjective: \"I have to go to the bathroom. \"   Pt in chair initially. Alert. Agreeable to working with PT. Flat affect with minimal conversation, but answers questions. Slow to respond to questions. Not sure when she's being D/Sigifredo. Pain: No c/o voiced    Objective:    Exercises  15 reps B LE seated ex: heel raises, toe raises, LAQ, hip abd/add, hip flexion. Occasional rest breaks between.    Other: Attempted hip adductor squeezes with pillow, but pt unable to comprehend. Transfers  Sit to stand: Min assist x 1 from chair (1st trial); Mod assist x 1 from chair (2nd trial); Mod assist x 1 from commode with grab bar  Stand to sit: Min assist x 1 into chair (twice); Min assist x 1 onto commode with grab bar    Ambulation  Assistance Level: CGA to Min assist x 1  Assistive device: Wheeled walker  Distance: 40 ft x 1, 16 ft x 2. Seated rests between walks. Quality of gait: Decreased pace; step-to pattern; flexed posture; moderate reliance on walker for support; weak    Balance  Static stance with CGA  Ambulation with wheeled walker CGA to Min assist  Sat on commode ~ 5 minutes with SBA    Patient Education  Hand placement with transfers when using walker. Needs occasional cues/reminders. Calling for assist with needs. Expressed understanding. Safety Devices  Pt left with needs in reach. In chair with chair alarm on. RN updated. AM-PAC score  AM-PAC Inpatient Mobility Raw Score : 16  AM-PAC Inpatient T-Scale Score : 40.78  Mobility Inpatient CMS 0-100% Score: 54.16  Mobility Inpatient CMS G-Code Modifier : CK    Goals: ,cwg  Time Frame for Short term goals: dc  Short term goal 1: Sit<>stand SBA   ongoing  Short term goal 2: Ambulate 36' with RW SBA   ongoing  Short term goal 3: up/down 4 steps with rail CGA  ongoing  Short term goal 4: Bed Mobility HOB flat with rail SBA  ongoing     Plan:  Times per week: 5-7;    Current Treatment Recommendations: Strengthening, Balance Training, Functional Mobility Training, Transfer Training, Gait Training, Patient/Caregiver Education & Training    Therapy Time    Individual  Concurrent  Group  Co-treatment    Time In  1050            Time Out  1130            Minutes  40              Timed Code Treatment Minutes: 40  Total Treatment Minutes: 40    Will continue per plan of care. If patient is discharged prior to next treatment, this note will serve as the discharge summary.     [facility], [place] #[id number]    Addendum: 0 goals

## 2021-05-20 NOTE — PLAN OF CARE
Problem: Infection:  Goal: Will remain free from infection  Description: Will remain free from infection  Outcome: Ongoing  Note: Patient's vitals remain stable. There is no fever present. Problem: Pain:  Goal: Patient's pain/discomfort is manageable  Description: Patient's pain/discomfort is manageable  Outcome: Ongoing  Note: Advanced Dementia scale used to assess pain. Patient scores a 0 in all areas. Problem: Falls - Risk of:  Goal: Will remain free from falls  Description: Will remain free from falls  Outcome: Ongoing  Note: Fall precautions in place. Bed is in lowest position, wheels locked and alarm on. Non-skid socks on. Call light and bedside table within reach. Pt calls out appropriately. Pt is up x 2 assist with a walker vs. Earnest guaman. Patient is on camera due to increased fall risk and confusion. Will continue to assess and monitor.

## 2021-05-20 NOTE — PROGRESS NOTES
Hospitalist Progress Note      PCP: Roselyn Celeste    Chief Complaint. Patient is a 73-year-old female with past medical history of schizophrenia, diabetes mellitus, DVT status post IVC filter, dementia who presented to hospital due to altered mental status, generalized weakness. Patient is unable to provide reliable history due to multiple comorbid conditions, reportedly patient presented to the emergency department today as patient was noted to have change in mental status, generalized . According to the patient's home care nurse patient was noted to have right eye droop and generalized weakness, patient is usually more alert and awake however patient was found to be weak and not communicating much. Patient has history of previous CVA in the past.  Patient was recently treated for urinary tract infection on last admission. No reported fevers chills chest pain shortness of breath.     Date of Admission: 5/14/2021    Subjective: Patient seen and examined  More alert today  Working with physical therapy  No new complain    Medications:  Reviewed    Infusion Medications    dextrose      sodium chloride       Scheduled Medications    alogliptin  25 mg Oral Daily    [START ON 5/21/2021] insulin glargine  20 Units Subcutaneous Daily    insulin lispro  0-18 Units Subcutaneous TID WC    insulin lispro  0-9 Units Subcutaneous Nightly    losartan  50 mg Oral Daily    torsemide  40 mg Oral Daily    clopidogrel  75 mg Oral Daily    famotidine  20 mg Oral Daily    metoprolol tartrate  25 mg Oral BID    risperiDONE  2 mg Oral Nightly    sodium chloride flush  10 mL Intravenous 2 times per day    enoxaparin  40 mg Subcutaneous Daily    atorvastatin  40 mg Oral Nightly     PRN Meds: glucose, dextrose, glucagon (rDNA), dextrose, sodium chloride flush, sodium chloride, promethazine **OR** ondansetron      Intake/Output Summary (Last 24 hours) at 5/20/2021 1750  Last data filed at 5/20/2021 1445  Gross per 24 hour   Intake 520 ml   Output 2350 ml   Net -1830 ml       Physical Exam Performed:    /84   Pulse 66   Temp 97.4 °F (36.3 °C) (Oral)   Resp 18   Ht 5' 6\" (1.676 m)   Wt 257 lb (116.6 kg)   SpO2 99%   BMI 41.48 kg/m²     General appearance: NAD, some what lethargic slow to respond, sitting n chair  HEENT:  Conjunctivae/corneas clear. Neck: Supple, with full range of motion. Respiratory:  Normal respiratory effort. Clear to auscultation, bilaterally without Rales/Wheezes/Rhonchi. Cardiovascular: Regular rate and rhythm with normal S1/S2 without murmurs or rubs  Abdomen: Soft, non-tender, non-distended, normal bowel sounds. Musculoskeletal: No cyanosis or edema bilaterally  Neurologic:  Seems to have R facial droop,  Psychiatric: Alert and oriented x 1, Normal mood  Peripheral Pulses: +2 palpable, equal bilaterally       Labs:   Recent Labs     05/18/21  1144   WBC 8.6   HGB 12.3   HCT 36.5        Recent Labs     05/18/21  1144      K 4.1      CO2 26   BUN 14   CREATININE 0.8   CALCIUM 10.6     No results for input(s): AST, ALT, BILIDIR, BILITOT, ALKPHOS in the last 72 hours. No results for input(s): INR in the last 72 hours. No results for input(s): Jorge L Journey in the last 72 hours. Urinalysis:      Lab Results   Component Value Date    NITRU Negative 05/14/2021    WBCUA 21-50 04/15/2021    BACTERIA 4+ 04/15/2021    RBCUA 3-4 04/15/2021    BLOODU Negative 05/14/2021    SPECGRAV 1.020 05/14/2021    GLUCOSEU Negative 05/14/2021       Radiology:  VL Extremity Venous Bilateral   Final Result      NM PARATHYORID W SPECT   Final Result      There are no scintigraphic findings to identify or localize a parathyroid adenoma. CT HEAD WO CONTRAST   Final Result      1. Age-related atrophy with patchy periventricular white matter changes bilaterally consistent with chronic small vessel ischemia.       3.  Stable right frontal lobe encephalomalacia consistent with remote infarct. Overall no significant interval change in appearance of the head from prior exam.      MRA NECK W WO CONTRAST   Final Result      1. Small acute bland infarct at the left frontoparietal junction. 2.  Chronic right frontal lobe infarct. 3.  Moderate chronic small vessel ischemic disease. 4.  No aneurysms, vascular occlusions, or intracranial stenoses identified. 5.  No significant stenosis in the extracranial vertebral or carotid arteries. MRA HEAD WO CONTRAST   Final Result      1. Small acute bland infarct at the left frontoparietal junction. 2.  Chronic right frontal lobe infarct. 3.  Moderate chronic small vessel ischemic disease. 4.  No aneurysms, vascular occlusions, or intracranial stenoses identified. 5.  No significant stenosis in the extracranial vertebral or carotid arteries. MRI BRAIN WO CONTRAST   Final Result      1. Small acute bland infarct at the left frontoparietal junction. 2.  Chronic right frontal lobe infarct. 3.  Moderate chronic small vessel ischemic disease. 4.  No aneurysms, vascular occlusions, or intracranial stenoses identified. 5.  No significant stenosis in the extracranial vertebral or carotid arteries. XR CHEST PORTABLE   Final Result      Elevated right hemidiaphragm. Clear lungs. Stable top normal heart size. CT HEAD WO CONTRAST   Final Result      Age-related changes and old infarct without acute intracranial abnormality. Assessment/Plan:    Active Hospital Problems    Diagnosis     AMS (altered mental status) [R41.82]          Patient is a 79-year-old female with past medical history of schizophrenia, diabetes mellitus, DVT status post IVC filter, dementia who presented to hospital due to altered mental status, generalized weakness.       Assessment  1.  Acute metabolic encephalopathy , improving , likely due to CVA  MRI brain ordered from ED - shows acute cva, echo without pfo, CT head

## 2021-05-20 NOTE — PROGRESS NOTES
Patient is alert and oriented only to self, she is disoriented to place, time and situation. Neuro checks remain unchanged at this time. NIHSS of 5 due to expressive and receptive aphasia. Patient slurs words and has delayed responses when reading sentences. Patient denies pain at [facility] time. She scores a 0 on the Advanced Dementia scale. Patient is tolerating diet well - and drinking adequate amount of fluids. No coughing or signs of aspiration noted. Patient is voiding adequately via Pure[place]. Vital signs remain stable. Will continue to monitor and assess.

## 2021-05-20 NOTE — PLAN OF CARE
Problem: Infection:  Goal: Will remain free from infection  Description: Will remain free from infection  5/19/2021 2332 by Kathleen La RN  Outcome: Ongoing  Note: Patient's vitals remain stable. There is no fever present. Problem: Pain:  Goal: Patient's pain/discomfort is manageable  Description: Patient's pain/discomfort is manageable  5/19/2021 2332 by Kathleen La RN  Outcome: Ongoing  Note: Advanced Dementia scale used to assess pain. Patient scores a 0 in all areas. Problem: Injury - Risk of, Physical Injury:  Goal: Will remain free from falls  Description: Will remain free from falls  5/20/2021 1008 by Cynthia Rain RN  Outcome: Ongoing  Note: Alert to person aware she is in [facility] , thinks its 2002 , able to eat 100 percent of diet with thin liquids without choking or coughing, working with therapy at bed side , will monitor , fall risk , bed and chair alarm on , contact guard ambulation with walker and gait belt   5/19/2021 2332 by Kathleen La RN  Outcome: Ongoing  Note: Fall precautions in place. Bed is in lowest position, wheels locked and alarm on. Non-skid socks on. Call light and bedside table within reach. Pt calls out appropriately. Pt is up x 2 assist with a walker vs. [employer] stedy. Patient is on camera due to increased fall risk and confusion. Will continue to assess and monitor. Goal: Absence of physical injury  Description: Absence of physical injury  5/20/2021 0432 by Kathleen La RN  Outcome: Met This Shift  Note: Patient has remained safe this shift - she has not gotten OOB by herself.

## 2021-05-21 VITALS
BODY MASS INDEX: 41.3 KG/M2 | OXYGEN SATURATION: 97 % | WEIGHT: 257 LBS | DIASTOLIC BLOOD PRESSURE: 67 MMHG | HEIGHT: 66 IN | HEART RATE: 67 BPM | SYSTOLIC BLOOD PRESSURE: 102 MMHG | RESPIRATION RATE: 18 BRPM | TEMPERATURE: 97.3 F

## 2021-05-21 LAB
ANION GAP SERPL CALCULATED.3IONS-SCNC: 9 MMOL/L (ref 3–16)
BUN BLDV-MCNC: 17 MG/DL (ref 7–20)
CALCIUM SERPL-MCNC: 10.6 MG/DL (ref 8.3–10.6)
CHLORIDE BLD-SCNC: 103 MMOL/L (ref 99–110)
CO2: 27 MMOL/L (ref 21–32)
CREAT SERPL-MCNC: 1 MG/DL (ref 0.6–1.2)
EKG ATRIAL RATE: 75 BPM
EKG DIAGNOSIS: NORMAL
EKG P AXIS: 42 DEGREES
EKG P-R INTERVAL: 160 MS
EKG Q-T INTERVAL: 386 MS
EKG QRS DURATION: 78 MS
EKG QTC CALCULATION (BAZETT): 431 MS
EKG R AXIS: -26 DEGREES
EKG T AXIS: 43 DEGREES
EKG VENTRICULAR RATE: 75 BPM
GFR AFRICAN AMERICAN: >60
GFR NON-AFRICAN AMERICAN: 54
GLUCOSE BLD-MCNC: 178 MG/DL (ref 70–99)
GLUCOSE BLD-MCNC: 188 MG/DL (ref 70–99)
GLUCOSE BLD-MCNC: 250 MG/DL (ref 70–99)
PERFORMED ON: ABNORMAL
PERFORMED ON: ABNORMAL
POTASSIUM REFLEX MAGNESIUM: 4.1 MMOL/L (ref 3.5–5.1)
SODIUM BLD-SCNC: 139 MMOL/L (ref 136–145)

## 2021-05-21 PROCEDURE — 36415 COLL VENOUS BLD VENIPUNCTURE: CPT

## 2021-05-21 PROCEDURE — 6370000000 HC RX 637 (ALT 250 FOR IP): Performed by: PSYCHIATRY & NEUROLOGY

## 2021-05-21 PROCEDURE — 2580000003 HC RX 258: Performed by: INTERNAL MEDICINE

## 2021-05-21 PROCEDURE — 80048 BASIC METABOLIC PNL TOTAL CA: CPT

## 2021-05-21 PROCEDURE — 93010 ELECTROCARDIOGRAM REPORT: CPT | Performed by: INTERNAL MEDICINE

## 2021-05-21 PROCEDURE — 6360000002 HC RX W HCPCS: Performed by: INTERNAL MEDICINE

## 2021-05-21 PROCEDURE — 6370000000 HC RX 637 (ALT 250 FOR IP): Performed by: INTERNAL MEDICINE

## 2021-05-21 RX ORDER — LOSARTAN POTASSIUM 50 MG/1
50 TABLET ORAL DAILY
Qty: 30 TABLET | Refills: 3 | Status: SHIPPED | OUTPATIENT
Start: 2021-05-22

## 2021-05-21 RX ORDER — INSULIN GLARGINE 100 [IU]/ML
25 INJECTION, SOLUTION SUBCUTANEOUS EVERY MORNING
Qty: 1 VIAL | Refills: 3 | Status: SHIPPED | OUTPATIENT
Start: 2021-05-21

## 2021-05-21 RX ORDER — CLOPIDOGREL BISULFATE 75 MG/1
75 TABLET ORAL DAILY
Qty: 30 TABLET | Refills: 3 | Status: SHIPPED | OUTPATIENT
Start: 2021-05-22

## 2021-05-21 RX ORDER — TORSEMIDE 20 MG/1
40 TABLET ORAL DAILY
Qty: 30 TABLET | Refills: 3 | Status: SHIPPED | OUTPATIENT
Start: 2021-05-22

## 2021-05-21 RX ADMIN — CLOPIDOGREL BISULFATE 75 MG: 75 TABLET ORAL at 08:50

## 2021-05-21 RX ADMIN — ALOGLIPTIN 25 MG: 25 TABLET, FILM COATED ORAL at 08:50

## 2021-05-21 RX ADMIN — INSULIN LISPRO 3 UNITS: 100 INJECTION, SOLUTION INTRAVENOUS; SUBCUTANEOUS at 11:15

## 2021-05-21 RX ADMIN — TORSEMIDE 40 MG: 20 TABLET ORAL at 08:50

## 2021-05-21 RX ADMIN — FAMOTIDINE 20 MG: 20 TABLET, FILM COATED ORAL at 08:50

## 2021-05-21 RX ADMIN — ENOXAPARIN SODIUM 40 MG: 40 INJECTION SUBCUTANEOUS at 08:49

## 2021-05-21 RX ADMIN — METOPROLOL TARTRATE 25 MG: 25 TABLET, FILM COATED ORAL at 08:50

## 2021-05-21 RX ADMIN — INSULIN LISPRO 9 UNITS: 100 INJECTION, SOLUTION INTRAVENOUS; SUBCUTANEOUS at 07:39

## 2021-05-21 RX ADMIN — Medication 10 ML: at 11:16

## 2021-05-21 RX ADMIN — LOSARTAN POTASSIUM 50 MG: 25 TABLET, FILM COATED ORAL at 08:50

## 2021-05-21 ASSESSMENT — PAIN SCALES - PAIN ASSESSMENT IN ADVANCED DEMENTIA (PAINAD)
TOTALSCORE: 0
NEGVOCALIZATION: 0
TOTALSCORE: 0
FACIALEXPRESSION: 0
FACIALEXPRESSION: 0
CONSOLABILITY: 0
CONSOLABILITY: 0
BREATHING: 0

## 2021-05-21 ASSESSMENT — PAIN SCALES - GENERAL: PAINLEVEL_OUTOF10: 0

## 2021-05-21 NOTE — PLAN OF CARE
Problem: Skin Integrity:  Goal: Absence of new skin breakdown  Description: Absence of new skin breakdown  5/21/2021 0440 by Ayad Celeste RN  Outcome: Ongoing  Note: Patient is on a specialty mattress - she is able to turn herself. Roman Lofts is in place to prevent skin breakdown due to incontinence. Problem: Infection:  Goal: Will remain free from infection  Description: Will remain free from infection  5/21/2021 1025 by Sung Spann RN  Outcome: Ongoing  Note: Alert oriented , voiding clear yellow urine , moderate Bm yesterday , lungs diminished in bases , respirations regular unlabored , no fever   5/21/2021 0440 by Ayad Celeste RN  Outcome: Ongoing  Note: Patient remains afebrile and free of infection at this time. Problem: Daily Care:  Goal: Daily care needs are met  Description: Daily care needs are met  5/21/2021 0440 by Ayad Celeste RN  Outcome: Ongoing     Problem: Pain:  Goal: Pain level will decrease  Description: Pain level will decrease  5/21/2021 0440 by Ayad Celeste RN  Outcome: Ongoing  Note: Advanced Dementia scale used for pain. The patient scores a 0 in all areas. Problem: Skin Integrity:  Goal: Absence of new skin breakdown  Description: Absence of new skin breakdown  5/21/2021 0440 by Ayad Celeste RN  Outcome: Ongoing  Note: Patient is on a specialty mattress - she is able to turn herself. Roman Lofts is in place to prevent skin breakdown due to incontinence. Problem: Infection:  Goal: Will remain free from infection  Description: Will remain free from infection  5/21/2021 1025 by Sung Spann RN  Outcome: Ongoing  Note: Alert oriented , voiding clear yellow urine , moderate Bm yesterday , lungs diminished in bases , respirations regular unlabored , no fever   5/21/2021 0440 by Ayad Celeste RN  Outcome: Ongoing  Note: Patient remains afebrile and free of infection at this time.       Problem: Falls - Risk of:  Goal: Will remain free from falls  Description: Will

## 2021-05-21 NOTE — PROGRESS NOTES
NUTRITION NOTE   Admission Date: 5/14/2021     Type and Reason for Visit: Initial, RD Nutrition Re-Screen/LOS    NUTRITION RECOMMENDATIONS:   1. PO Diet: Continue dental soft: CC3, low sodium diet   2. ONS: not indicated  3. Nutrition education: not appropriate at this time d/t mental status     NUTRITION ASSESSMENT:  Pt assessed for LOS x6 days. Pt with acute metabolic encephalopathy likely d/t CVA per MD. EMR reflects intakes of % almost all meals. Pt on dental soft diet per SLP. Pt seen after finishing breakfast, 100% of tray consumed. Noted pt to discharge today. Patient admitted d/t: AMS, generalized weakness     PMH significant for: T2DM, schizophrenia, dementia, DVT s/t IVC filter    MALNUTRITION ASSESSMENT  Context of Malnutrition: Acute Illness   Malnutrition Status: No malnutrition     NUTRITION DIAGNOSIS   Problem: Problem #1: Altered nutrition-related lab values  Etiology: Altered absorption or metabolism  Signs & Symptoms: Lab Values  A1C of 10.9    NUTRITION INTERVENTION  Food and/or Nutrient Delivery: Continue Current Diet   Nutrition Education/Counseling: No recommendation at this time Coordination of Nutrition Care: Continue to monitor while inpatient     NUTRITION RISK LEVEL: Risk Level: Low        The patient will still be monitored per nutrition standards of care. Consult dietitian if nutrition interventions essential to patient care is needed.      Regina Combs, 68 [address], [address] [facility] [address]:  [phone]  Office:  [phone]

## 2021-05-21 NOTE — PLAN OF CARE
Problem: Skin Integrity:  Goal: Absence of new skin breakdown  Description: Absence of new skin breakdown  Outcome: Ongoing  Note: Patient is on a specialty mattress - she is able to turn herself. Knute Rile is in place to prevent skin breakdown due to incontinence. Problem: Infection:  Goal: Will remain free from infection  Description: Will remain free from infection  Outcome: Ongoing  Note: Patient remains afebrile and free of infection at this time. Problem: Daily Care:  Goal: Daily care needs are met  Description: Daily care needs are met  Outcome: Ongoing     Problem: Pain:  Goal: Pain level will decrease  Description: Pain level will decrease  Outcome: Ongoing  Note: Advanced Dementia scale used for pain. The patient scores a 0 in all areas. Problem: Discharge Planning:  Goal: Patients continuum of care needs are met  Description: Patients continuum of care needs are met  Outcome: Ongoing  Note: Patient will d / c to a SNF when accepted. Plan updated by case management. Problem: Falls - Risk of:  Goal: Will remain free from falls  Description: Will remain free from falls  Outcome: Ongoing  Note: Fall precautions in place. Bed is in lowest position, wheels locked and alarm on. Non-skid socks on. Call light and bedside table within reach. Pt calls out appropriately. Pt is up x 1 -2 assist with a stedy vs a walker. Patient is on camera due to increased confusion / high fall risk. Will continue to assess and monitor. Problem: Confusion - Acute:  Goal: Absence of continued neurological deterioration signs and symptoms  Description: Absence of continued neurological deterioration signs and symptoms  Outcome: Ongoing  Note: Patient's NIHSS score has remained stable at a 5. No new neuro deficits noted.

## 2021-05-21 NOTE — PROGRESS NOTES
[facility]    [facility]              [phone]                          Interval History and plan:      PTH is 98 ( elevated ). greta has primary hyperparathyroidism   Vitamin D is normal 41  Calcium is slightly better at 10.6  PTH SPECT scan for negative adenoma    free light chain ratio was 1.9. Stopped IVF                     Assessment :     Hypercalcemia: She has chronic mild hypercalcemia. She presented with a calcium of 11.2. CT abdomen in the past chest showed small left renal calculus. At home she takes torsemide. It appears that she might have primary hyperparathyroidism. Continue with IV fluid. Urinalysis is normal.      [facility] would like to thank Rosalinda Hernandez MD   for opportunity to serve this patient      Please call with questions at-   24 Hrs Answering service [phone] or  7 am- 5 pm via Perfect serve or cell phone  Patti Tenorio MD          CC/reason for consult :     Hypercalcemia     HPI :     Fabiana Dinero is a 68 y.o. female presented to   the hospital on 5/14/2021 with weakness. She has past medical history of PE, schizophrenia, CVA, urinary incontinence, dementia, history of hemiparesis post CVA, hypertension and diabetes. She presented to ER with change in mental status and generalized weakness. As per the home care nurse patient was noted to have right eye droop and generalized weakness. Since she had previous CVAs in the past she was brought into the ER. She recently had UTI from Klebsiella. It is worth mentioning patient cannot give me any history. Most of the history was obtained by reviewing the chart and speaking to the staff. When she arrived calcium was 11.2 with a normal creatinine. I was called for further management of hypercalcemia.     ROS:     Seen with-no family in the room    positives in bold   Cannot be obtained                     All other remaining systems are negative or unable to obtain        PMH/PSH//Family History:     Past Medical History:   Diagnosis Date    Abnormal glucose     Acute pulmonary embolism ([facility] 9/13/2013    Allergic rhinitis, cause unspecified 5/7/2012    Constipation     CVA (cerebral infarction)     Dementia ([facility]     DVT (deep venous thrombosis) ([facility] 2007 & 2011    Hemiparesis ([facility]     HTN (hypertension)     Hyperlipidemia     Lumbago 10/24/2008    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 ([address]     Other abnormal glucose 7/21/2011    PE (pulmonary embolism)     Personal history of schizophrenia 5/1/2008    Personal history of venous thrombosis and embolism 6/2/2008    S/P hysterectomy     Schizophrenia ([facility]     Unspecified cerebral artery occlusion with cerebral infarction     Unspecified urinary incontinence 10/17/2012    Urinary retention     Venous thrombosis        Past Surgical History:   Procedure Laterality Date    HYSTERECTOMY  2011    [facility] for abnormal uterine bleeding    OTHER SURGICAL HISTORY  10/01/2013    EUS, EGD        reports that she has never smoked. She has never used smokeless tobacco. She reports that she does not drink alcohol and does not use drugs. family history is not on file.          Medication:     Current Facility-Administered Medications: alogliptin (NESINA) tablet 25 mg, 25 mg, Oral, Daily  insulin glargine (LANTUS;BASAGLAR) injection pen 20 Units, 20 Units, Subcutaneous, Daily  insulin lispro (1 Unit Dial) 0-18 Units, 0-18 Units, Subcutaneous, TID WC  insulin lispro (1 Unit Dial) 0-9 Units, 0-9 Units, Subcutaneous, Nightly  losartan (COZAAR) tablet 50 mg, 50 mg, Oral, Daily  torsemide (DEMADEX) tablet 40 mg, 40 mg, Oral, Daily  clopidogrel (PLAVIX) tablet 75 mg, 75 mg, Oral, Daily  famotidine (PEPCID) tablet 20 mg, 20 mg, Oral, Daily  metoprolol tartrate (LOPRESSOR) tablet 25 mg, 25 mg, Oral, BID  risperiDONE (RISPERDAL) tablet 2 mg, 2 mg, Oral, Nightly  glucose (GLUTOSE) 40 %

## 2021-05-21 NOTE — CARE COORDINATION
Case Management            Discharge Note                    Date / Time of Note: 5/21/2021 9:07 AM                  Discharge Note Completed by: DWAIN Reyes    Patient Name: Rob Sow   YOB: 1940  Diagnosis: AMS (altered mental status) [R41.82]   Date / Time: 5/14/2021 11:40 AM    Current PCP: Sanjiv Bain patient: No    Hospitalization in the last 30 days: Yes    Advance Directives:  Code Status: Full Code  [address] DNR form completed and on chart: Not Indicated    Financial:  Payor: Jeffry Davis / Plan: Velvet Palacios PPO / Product Type: Medicare /      Pharmacy:    [facility], [address] [facility]  [address] [place] Brunner [address]  Phone: [phone] Fax: [phone]      Assistance purchasing medications?: Potential Assistance Purchasing Medications: No  Assistance provided by Case Management: None at this time    Does patient want to participate in local refill/ meds to beds program?: No    Meds To Beds General Rules:  1. Can ONLY be done Monday- Friday between 8:30am-5pm  2. Prescription(s) must be in pharmacy by 3pm to be filled same day  3. Copy of patient's insurance/ prescription drug card and patient face sheet must be sent along with the prescription(s)  4. Cost of Rx cannot be added to hospital bill. If financial assistance is needed, please contact unit  or ;  or  CANNOT provide pharmacy voucher for patients co-pays  5.  Patients can then  the prescription on their way out of the hospital at discharge, or pharmacy can deliver to the bedside if staff is available. (payment due at time of pick-up or delivery - cash, check, or card accepted)     Able to afford home medications/ co-pay costs: Yes    ADLS:  Current PT AM-PAC Score: 16 /24  Current OT AM-PAC Score: 15 /24      Discharge Disposition: [place] (SNF):   [facility]ard at [address] [facility], [address]  Report: [phone]   Fax: [phone]     LOC at discharge: Skilled  AMBER Completed: Yes    Notification completed in HENS/PAS?:  Yes : CM has completed HENS online through secure website for SNF admission at [facility] [address] EVE PHAM. Document ID #: [id number]    IMM Completed:   Yes, Case management has presented and reviewed IMM letter #2 to the patient and/or family/ POA. Patient and/or family/POA verbalized understanding of their medicare rights and appeal process if needed. Patient and/or family/POA has signed, initialed and placed today's date (5/20) and time (see chart) on IMM letter #2 on the the appropriate lines. Patient and/or family/POA, copy of letter offered and they are aware that this original copy of IMM letter #2 is available prior to discharge from the paper chart on the unit. Electronic documentation has been entered into epic for IMM letter #2 and original paper copy has been added to the paper chart at the nurses station. Transportation:  Transportation Plan for discharge: EMS transportation   Mode of Transport: Ambulance stretcher - S    Reason for medical transport: Other: x1 assist after TKA. Memory issues, Schizophrenia  hx of DVT, Dementia  Name of [address]: [address]  Phone: [phone]  Transport Time: 12:00 pm     Transportation form completed: Yes    Referrals made at [address] [place] [address] for outpatient continued care:  Not Applicable    Additional CM Notes:   Patient is medically ready for discharge on this date. Patient received pre-cert this AM per Kanu and they an accept anytime today. NICOLE setup transport with [facility] ([phone]) for 12:00 pm. NICOLE notified facility, MD, floor, and updated daughter via phone.        The Plan for Transition of Care is related to the following treatment goals AMS (altered mental status) [R41.82]      The Patient and/or patient representative was provided with a choice of provider and agrees with the discharge plan Yes    Freedom of choice list was provided with basic dialogue that supports the patient's individualized plan of care/goals and shares the quality data associated with the providers.  Yes    Care Transitions patient: No    DWAIN Acuna  [facility]   Case Management Department  Ph: [phone]

## 2021-05-21 NOTE — PROGRESS NOTES
During NIHSS assessment patient began moaning and showing expressions of pain on her face. She complained of left arm pain that she was unable to rate. Hospitalist on call was notified - STAT CT of the head was ordered as well as a 12 lead EKG. Hospitalist also ordered 325 mg of aspirin to help with the left arm pain. Will continue to monitor.

## 2021-05-21 NOTE — DISCHARGE INSTR - COC
Continuity of Care Form    Patient Name: Mian    :  1940  MRN:  [id number]    Admit date:  2021  Discharge date:  21    Code Status Order: Full Code   Advance Directives:   [address] [facility] Documentation       Date/Time Healthcare Directive Type of Healthcare Directive Copy in [address] Agent's Name Healthcare Agent's Phone Number    21  Yes, patient has an advance directive for healthcare treatment  Living will;Durable power of  for health care  Yes, copy in chart  Adult [address]  [phone]            Admitting Physician:  Michaelle Lucia MD  PCP: Torsten Francisco    Discharging Nurse: [facility] Unit/Room#: 6846/7562-00  Discharging Unit Phone Number: ***    Emergency Contact:   Extended Emergency Contact Information  Primary Emergency Contact: [address] [place] [address] of [address] [place] [address] Phone: [phone]  Relation: Child  Secondary Emergency Contact: [address] [facility] Phone: [phone]  Relation: Child    Past Surgical History:  Past Surgical History:   Procedure Laterality Date    HYSTERECTOMY      [address] for abnormal uterine bleeding    OTHER SURGICAL HISTORY  10/01/2013    EUS, EGD       Immunization History: There is no immunization history for the selected administration types on file for this patient.     Active Problems:  Patient Active Problem List   Diagnosis Code    Essential hypertension I10    Cerebral infarction ([facility] I63.9    Hemiparesis, left (HCC) G81.94    GI bleed K92.2    Hypovolemia E86.1    Iron deficiency E61.1    Acute posthemorrhagic anemia D62    Chest pain R07.9    Hyperlipidemia E78.5    DMII (diabetes mellitus, type 2) (HCC) E11.9    Morbid obesity due to excess calories ([facility]) E66.01    AMS (altered mental status) R41.82       Isolation/Infection:   Isolation            No Isolation          Patient Infection Status Infection Onset Added Last Indicated Last Indicated By Review Planned Expiration Resolved Resolved By    None active    Resolved    COVID-19 Rule Out 05/19/21 05/19/21 05/19/21 COVID-19 (Ordered)   05/20/21 Rule-Out Test Resulted            Nurse Assessment:  Last Vital Signs: BP (!) 158/69   Pulse 71   Temp 97.7 °F (36.5 °C) (Oral)   Resp 18   Ht 5' 6\" (1.676 m)   Wt 257 lb (116.6 kg)   SpO2 99%   BMI 41.48 kg/m²     Last documented pain score (0-10 scale): Pain Level: 0  Last Weight:   Wt Readings from Last 1 Encounters:   05/14/21 257 lb (116.6 kg)     Mental Status:  {IP PT MENTAL STATUS:20030:::0}    IV Access:  { AMBER IV ACCESS:939071063:::0}    Nursing Mobility/ADLs:  Walking   {CHP DME ADLs:554896234:::0}  Transfer  {CHP DME ADLs:934016237:::0}  Bathing  {CHP DME ADLs:311273795:::0}  Dressing  {CHP DME ADLs:838026136:::0}  Toileting  {CHP DME ADLs:239686201:::0}  Feeding  {CHP DME ADLs:663285398:::0}  Med Admin  {CHP DME ADLs:059645513:::0}  Med Delivery   { AMBER MED Delivery:427620560:::0}    Wound Care Documentation and Therapy:        Elimination:  Continence: Bowel: {YES / AT:96926}  Bladder: {YES / TE:31927}  Urinary Catheter: {Urinary Catheter:344610693:::0}   Colostomy/Ileostomy/Ileal Conduit: {YES / FP:75948}       Date of Last BM: ***    Intake/Output Summary (Last 24 hours) at 5/21/2021 0907  Last data filed at 5/21/2021 0600  Gross per 24 hour   Intake 280 ml   Output 1700 ml   Net -1420 ml     I/O last 3 completed shifts:   In: 26 [P.O.:510; I.V.:10]  Out: 2300 [Urine:2300]    Safety Concerns:     [address] AMBER Safety Concerns:644061886:::0}    Impairments/Disabilities:      [address] CLARK Impairments/Disabilities:943046799:::0}    Nutrition Therapy:  Current Nutrition Therapy:   [address] Apar[address] AMBER Diet List:005666904:::0}    Routes of Feeding: {CHP DME Other Feedings:647252457:::0}  Liquids: {Slp liquid thickness:31663}  Daily Fluid Restriction: {CHP DME Yes amt example:233458421:::0}  Last Modified Barium Swallow with Video (Video Swallowing Test): {Done Not Done RAWP:289196072:::5}    Treatments at the Time of Hospital Discharge:   Respiratory Treatments: ***  Oxygen Therapy:  {Therapy; copd oxygen:99231:::0}  Ventilator:    {MH CC Vent List:232725388:::0}    Rehab Therapies: {THERAPEUTIC INTERVENTION:1832042680}  Weight Bearing Status/Restrictions: [facility] CC Weight Bearin:::0}  Other Medical Equipment (for information only, NOT a DME order):  {EQUIPMENT:707381382}  Other Treatments: ***    Patient's personal belongings (please select all that are sent with patient):  {CHP DME Belongings:608372313:::0}    RN SIGNATURE:  {Esignature:191070555:::0}    CASE MANAGEMENT/SOCIAL WORK SECTION    Inpatient Status Date: 2021    Readmission Risk Assessment Score:  Readmission Risk              Risk of Unplanned Readmission:  17           Discharging to Facility/ Agency   Courtyard at [address] [place] [address]  Report: [phone]   Fax: [phone]     / signature: Electronically signed by DWAIN Rivas on 21 at 9:14 AM EDT    PHYSICIAN SECTION    Prognosis: Fair    Condition at Discharge: Stable    Rehab Potential (if transferring to Rehab): Fair    Recommended Labs or Other Treatments After Discharge: PT, OT, Nursing , SLP eval and treat    Physician Certification: I certify the above information and transfer of Lizbeth Caceres  is necessary for the continuing treatment of the diagnosis listed and that she requires [facility] for less 30 days.      Update Admission H&P: No change in H&P    PHYSICIAN SIGNATURE:  Electronically signed by Radha Muhammad MD on 21 at 9:07 AM EDT

## 2021-05-21 NOTE — PLAN OF CARE
Problem: HEMODYNAMIC STATUS  Goal: Patient has stable vital signs and fluid balance  Outcome: Ongoing  Note: Patient's vital signs have been stable with exception to a slightly high BP. Problem: COMMUNICATION IMPAIRMENT  Goal: Ability to express needs and understand communication  Outcome: Ongoing  Note: Patient has mild aphasia noted - she can communicate with some difficulty.

## 2021-05-21 NOTE — PROGRESS NOTES
Alert aware of place and name , able to feed self , no choking , thin liquids , no straws, dental soft , no present pain or discomfort , plan transfer to [facility] yard at noon .

## 2021-05-21 NOTE — PROGRESS NOTES
Patient oriented only to self - NIHSS score remains a 5. No changes in neuro status noted. Patient is tolerating diet well. She is voiding adequately via [place]. Vital signs remain stable. She is on Telemetry. Will continue to monitor and assess.

## 2022-10-04 NOTE — ED PROVIDER NOTES
Date of evaluation: 4/15/2021    ADDENDUM:      Care of this patient was assumed from Dr Shanel Huizar  The patient was seen for Fall and Hyperglycemia  . The patient's initial evaluation and plan have been discussed with the prior provider who initially evaluated the patient. Nursing Notes, Past Medical Hx, Past Surgical Hx, Social Hx, Allergies, and Family Hx were all reviewed. Diagnostic Results         RADIOLOGY:  XR CHEST PORTABLE   Final Result      No pneumothorax. Mildly increased prominence of a linear consolidation in the right midlung in comparison to chest x-ray of 9/29/2020. This was seen on the chest CT of 2/26/2020, with follow-up CT recommended. Again, nonemergent follow-up chest CT is recommended. Left lung is clear. Stable cardiomediastinal silhouette. XR KNEE RIGHT (1-2 VIEWS)   Final Result     No acute findings in the right knee. XR KNEE LEFT (1-2 VIEWS)   Final Result     No acute findings in the left knee. Progressive degenerative changes    involving the medial compartment. XR PELVIS (1-2 VIEWS)   Final Result     No acute findings in the pelvis. XR TIBIA FIBULA LEFT (2 VIEWS)   Final Result     No acute findings in the left tibia and fibula or surrounding soft    tissues. XR TIBIA FIBULA RIGHT (2 VIEWS)   Final Result   Soft tissue fullness noted at the ankle. Otherwise negative examination. XR ANKLE LEFT (MIN 3 VIEWS)   Final Result     Soft tissue fullness overlying the lateral malleolus. No acute osseous    traumatic injury or abnormal alignment. XR ANKLE RIGHT (MIN 3 VIEWS)   Final Result     Overlying soft tissue swelling noted both circumferentially about the    ankle and extending into the superior mid foot. No acute osseous    traumatic injury with stable chronic changes involving the medial    malleolus.           CT HEAD WO CONTRAST   Final Result     No acute intracranial process or significant Recommended COVID booster and flu vaccine once symptoms resolved alteration from the    previous examination with underlying incidental chronic changes, as    detailed above, stable in appearance. CT CERVICAL SPINE WO CONTRAST   Final Result   1. No acute osseous traumatic injury or significant abnormal alignment    involving the cervical spine. 2.  2.1 x 1.7 cm right thyroid nodule suspected. Detailed evaluation    limited. Recommend nonemergent diagnostic ultrasound, if not already    evaluated. LABS:   Labs Reviewed   CBC WITH AUTO DIFFERENTIAL - Abnormal; Notable for the following components:       Result Value    WBC 14.4 (*)     Hemoglobin 11.6 (*)     Hematocrit 34.9 (*)     RDW 15.8 (*)     Neutrophils Absolute 11.7 (*)     All other components within normal limits    Narrative:     Performed at: [facility] [employer] - [facility]  [address] [address] [address]   Phone [phone]   BASIC METABOLIC PANEL - Abnormal; Notable for the following components:    Sodium 134 (*)     Chloride 94 (*)     Glucose 528 (*)     BUN 22 (*)     GFR Non- 43 (*)     GFR  52 (*)     Calcium 11.0 (*)     All other components within normal limits    Narrative:     Performed at: [facility] [employer] - [facility]  [address] [address] [address]   Phone [phone]   BLOOD GAS, VENOUS - Abnormal; Notable for the following components:    pCO2, Mookie 52.0 (*)     pO2, Mookie 122.0 (*)     HCO3, Venous 33.0 (*)     Base Excess, Mookie 7.1 (*)     All other components within normal limits    Narrative:     Performed at:   [facility] [employer] - [facility]  [address] [facility], [facility] [address]   Phone [phone]   URINALYSIS - Abnormal; Notable for the following components:    Clarity, UA SL CLOUDY (*)     Glucose, Ur 500 (*)     Blood, Urine LARGE (*)     Leukocyte Esterase, Urine SMALL (*)     All other components within normal limits    Narrative: Performed at: [facility] [employer] - [facility],  [address] [place] [address], [address]   Phone [phone]   MICROSCOPIC URINALYSIS - Abnormal; Notable for the following components:    WBC, UA 21-50 (*)     Bacteria, UA 4+ (*)     All other components within normal limits    Narrative:     Performed at: [facility] [employer] - [address], [address]   Phone [phone]   POCT GLUCOSE - Abnormal; Notable for the following components:    POC Glucose 481 (*)     All other components within normal limits    Narrative:     Performed at: [facility] [employer] - [facility],  [address] [place] [address], [address]   Phone [phone]   POCT GLUCOSE - Abnormal; Notable for the following components:    POC Glucose 288 (*)     All other components within normal limits    Narrative:     Performed at: [facility] [employer] - [address], [address]   Phone [phone]   POCT GLUCOSE - Abnormal; Notable for the following components:    POC Glucose 311 (*)     All other components within normal limits    Narrative:     Performed at: [facility] [employer] - [address], [address]   Phone [phone]   CULTURE, URINE   TROPONIN    Narrative:     Performed at:   [facility] [employer] - [facility],  [address] [place] [address], [address]   Phone [phone]   POCT GLUCOSE       RECENT VITALS:  BP: (!) 145/68, Temp: 98.8 °F (37.1 °C), Pulse: 87, Resp: 30     Procedures         ED Course     The patient was given the following medications:  Orders Placed This Encounter   Medications    lactated ringers bolus    insulin regular (HUMULIN R;NOVOLIN R) injection 10 Units    potassium chloride (KLOR-CON M) extended release tablet 40 mEq    cefTRIAXone (ROCEPHIN) 1000 mg IVPB in 50 mL D5W minibag     Order Specific